# Patient Record
Sex: MALE | Race: BLACK OR AFRICAN AMERICAN | NOT HISPANIC OR LATINO | URBAN - METROPOLITAN AREA
[De-identification: names, ages, dates, MRNs, and addresses within clinical notes are randomized per-mention and may not be internally consistent; named-entity substitution may affect disease eponyms.]

---

## 2017-04-06 ENCOUNTER — TELEPHONE (OUTPATIENT)
Dept: HEMATOLOGY/ONCOLOGY | Facility: CLINIC | Age: 67
End: 2017-04-06

## 2017-04-06 DIAGNOSIS — C34.91 PRIMARY CANCER OF RIGHT LUNG: ICD-10-CM

## 2017-04-06 NOTE — TELEPHONE ENCOUNTER
----- Message from Octavio Reyez RN sent at 4/6/2017 10:32 AM CDT -----  Contact: Augusta Salazar ( wife) [phone]      ----- Message -----     From: Sandie Gracia     Sent: 4/4/2017  10:46 AM       To: KIMBERLY Porter ( wife) [phone] called in reference to scheduling pt for an appt with lung cancer.

## 2017-04-06 NOTE — TELEPHONE ENCOUNTER
----- Message from Sandie Gracia sent at 4/4/2017 10:46 AM CDT -----  Contact: Augusta Martin ( wife) [phone]  Augusta Salazar ( wife) [phone] called in reference to scheduling pt for an appt with lung cancer.

## 2017-04-18 ENCOUNTER — HOSPITAL ENCOUNTER (OUTPATIENT)
Dept: RADIOLOGY | Facility: HOSPITAL | Age: 67
Discharge: HOME | End: 2017-04-18
Attending: INTERNAL MEDICINE
Payer: MEDICARE

## 2017-04-18 VITALS — WEIGHT: 123.88 LBS | HEIGHT: 62 IN | BODY MASS INDEX: 22.8 KG/M2

## 2017-04-18 DIAGNOSIS — C34.91 PRIMARY CANCER OF RIGHT LUNG: ICD-10-CM

## 2017-04-18 PROCEDURE — 78815 PET IMAGE W/CT SKULL-THIGH: CPT | Mod: 26,PS,, | Performed by: NUCLEAR MEDICINE

## 2017-04-18 PROCEDURE — A9552 F18 FDG: HCPCS

## 2017-04-19 ENCOUNTER — LAB VISIT (OUTPATIENT)
Dept: LAB | Facility: HOSPITAL | Age: 67
End: 2017-04-19
Attending: INTERNAL MEDICINE
Payer: MEDICARE

## 2017-04-19 ENCOUNTER — INITIAL CONSULT (OUTPATIENT)
Dept: HEMATOLOGY/ONCOLOGY | Facility: CLINIC | Age: 67
End: 2017-04-19
Payer: MEDICARE

## 2017-04-19 VITALS
OXYGEN SATURATION: 97 % | DIASTOLIC BLOOD PRESSURE: 63 MMHG | HEART RATE: 83 BPM | SYSTOLIC BLOOD PRESSURE: 107 MMHG | TEMPERATURE: 98 F | RESPIRATION RATE: 18 BRPM | HEIGHT: 62 IN | WEIGHT: 124.13 LBS | BODY MASS INDEX: 22.84 KG/M2

## 2017-04-19 DIAGNOSIS — C34.91 PRIMARY CANCER OF RIGHT LUNG: ICD-10-CM

## 2017-04-19 DIAGNOSIS — R06.02 SOB (SHORTNESS OF BREATH): Primary | ICD-10-CM

## 2017-04-19 DIAGNOSIS — C34.91 PRIMARY CANCER OF RIGHT LUNG: Primary | ICD-10-CM

## 2017-04-19 LAB
ALBUMIN SERPL BCP-MCNC: 3.6 G/DL
ALP SERPL-CCNC: 108 U/L
ALT SERPL W/O P-5'-P-CCNC: 9 U/L
ANION GAP SERPL CALC-SCNC: 8 MMOL/L
AST SERPL-CCNC: 13 U/L
BILIRUB SERPL-MCNC: 0.4 MG/DL
BUN SERPL-MCNC: 8 MG/DL
CALCIUM SERPL-MCNC: 9.7 MG/DL
CHLORIDE SERPL-SCNC: 97 MMOL/L
CO2 SERPL-SCNC: 26 MMOL/L
CREAT SERPL-MCNC: 1.1 MG/DL
ERYTHROCYTE [DISTWIDTH] IN BLOOD BY AUTOMATED COUNT: 14.9 %
EST. GFR  (AFRICAN AMERICAN): >60 ML/MIN/1.73 M^2
EST. GFR  (NON AFRICAN AMERICAN): >60 ML/MIN/1.73 M^2
GLUCOSE SERPL-MCNC: 97 MG/DL
HCT VFR BLD AUTO: 36.1 %
HGB BLD-MCNC: 12.1 G/DL
MAGNESIUM SERPL-MCNC: 1.9 MG/DL
MCH RBC QN AUTO: 28.9 PG
MCHC RBC AUTO-ENTMCNC: 33.5 %
MCV RBC AUTO: 86 FL
NEUTROPHILS # BLD AUTO: 3.5 K/UL
PLATELET # BLD AUTO: 405 K/UL
PMV BLD AUTO: 8.9 FL
POTASSIUM SERPL-SCNC: 5.5 MMOL/L
PROT SERPL-MCNC: 8.4 G/DL
RBC # BLD AUTO: 4.19 M/UL
SODIUM SERPL-SCNC: 131 MMOL/L
WBC # BLD AUTO: 6.85 K/UL

## 2017-04-19 PROCEDURE — 99205 OFFICE O/P NEW HI 60 MIN: CPT | Mod: S$GLB,,, | Performed by: INTERNAL MEDICINE

## 2017-04-19 PROCEDURE — 83735 ASSAY OF MAGNESIUM: CPT

## 2017-04-19 PROCEDURE — 80053 COMPREHEN METABOLIC PANEL: CPT

## 2017-04-19 PROCEDURE — 85027 COMPLETE CBC AUTOMATED: CPT

## 2017-04-19 PROCEDURE — 36415 COLL VENOUS BLD VENIPUNCTURE: CPT

## 2017-04-19 PROCEDURE — 99999 PR PBB SHADOW E&M-EST. PATIENT-LVL III: CPT | Mod: PBBFAC,,, | Performed by: INTERNAL MEDICINE

## 2017-04-19 PROCEDURE — 1160F RVW MEDS BY RX/DR IN RCRD: CPT | Mod: S$GLB,,, | Performed by: INTERNAL MEDICINE

## 2017-04-19 RX ORDER — MORPHINE SULFATE 15 MG/1
15 TABLET, FILM COATED, EXTENDED RELEASE ORAL
COMMUNITY
End: 2017-07-19 | Stop reason: SDUPTHER

## 2017-04-19 RX ORDER — BENZONATATE 100 MG/1
100 CAPSULE ORAL
COMMUNITY
Start: 2016-09-29

## 2017-04-19 RX ORDER — CODEINE PHOSPHATE AND GUAIFENESIN 10; 100 MG/5ML; MG/5ML
5 SOLUTION ORAL 3 TIMES DAILY PRN
COMMUNITY

## 2017-04-19 RX ORDER — ATORVASTATIN CALCIUM 40 MG/1
40 TABLET, FILM COATED ORAL
COMMUNITY
Start: 2016-10-05

## 2017-04-19 RX ORDER — ASPIRIN 81 MG/1
81 TABLET ORAL
COMMUNITY
Start: 2016-10-05 | End: 2017-11-15 | Stop reason: SDUPTHER

## 2017-04-19 RX ORDER — OXYCODONE AND ACETAMINOPHEN 5; 325 MG/1; MG/1
1 TABLET ORAL
COMMUNITY
Start: 2016-10-27 | End: 2017-07-19 | Stop reason: SDUPTHER

## 2017-04-19 RX ORDER — TIOTROPIUM BROMIDE 18 UG/1
18 CAPSULE ORAL; RESPIRATORY (INHALATION)
COMMUNITY
Start: 2016-10-05 | End: 2017-04-19 | Stop reason: SDUPTHER

## 2017-04-19 RX ORDER — LISINOPRIL 2.5 MG/1
2.5 TABLET ORAL
COMMUNITY
Start: 2016-11-22 | End: 2018-01-05

## 2017-04-19 RX ORDER — ALBUTEROL SULFATE 90 UG/1
2 AEROSOL, METERED RESPIRATORY (INHALATION)
COMMUNITY
Start: 2016-10-05 | End: 2017-08-20

## 2017-04-19 RX ORDER — IPRATROPIUM BROMIDE AND ALBUTEROL SULFATE 2.5; .5 MG/3ML; MG/3ML
3 SOLUTION RESPIRATORY (INHALATION)
COMMUNITY
Start: 2016-10-05 | End: 2017-08-20

## 2017-04-19 RX ORDER — CARVEDILOL 3.12 MG/1
3.12 TABLET ORAL
COMMUNITY
Start: 2016-10-05 | End: 2017-04-26 | Stop reason: SDUPTHER

## 2017-04-19 RX ORDER — TIOTROPIUM BROMIDE 18 UG/1
18 CAPSULE ORAL; RESPIRATORY (INHALATION) DAILY
Qty: 30 CAPSULE | Refills: 1 | Status: SHIPPED | OUTPATIENT
Start: 2017-04-19 | End: 2017-11-06 | Stop reason: SDUPTHER

## 2017-04-19 NOTE — LETTER
April 19, 2017      Ubaldo Thomason MD  [address]  [facility] [address]           [facility] Hematology Oncology  [address]  [facility] [address]  Phone: [phone]          Patient: Karsten Salazar   MR Number: [id number]   YOB: 1950   Date of Visit: 4/19/2017       Dear Dr. Ubaldo Thomason:    Thank you for referring Karsten Salazar to me for evaluation. Attached you will find relevant portions of my assessment and plan of care.    If you have questions, please do not hesitate to call me. I look forward to following Karsten Salazar along with you.    Sincerely,    Gomez Garsia DO, FACP    Enclosure  CC:  No Recipients    If you would like to receive this communication electronically, please contact [email or web address] or [phone] to request more information on [employer] access.    For providers and/or their staff who would like to refer a patient to [facility], please contact us through our one-stop-shop provider referral line, [facility], at [phone].    If you feel you have received this communication in error or would no longer like to receive these types of communications, please e-mail [email or web address]

## 2017-04-19 NOTE — PROGRESS NOTES
PATIENT: Karsten Salazar  MRN: [id number]  DATE: 4/19/2017      Diagnosis:   1. Primary cancer of right lung        Chief Complaint: Lung Cancer      Oncologic History:      Oncologic History Non-small cell lung cancer diagnosed 6/2016     Oncologic Treatment Concurrent chemoXRT with weekly carboplatin and paclitaxel, XRT to a dose of 5840 cGy 10/31/2016    Pathology Squamous cell carcinoma, T3, N2, M0 (6/2016)        Subjective:    Interval History: Mr. Salazar is a 67 y.o. male who is seen as an initial visit.  He has previously seen Dr. Ubaldo Thomason at [facility].  His history dates to 6/2016 when he sought medical attention for shortness of breath and cough.  He was found to have a large lung mass in the right upper lobe with mediastinal lymphadenopathy.  He underwent subsequent workup, however, symptoms worsened and progressed to him having a cardiac arrest requiring CPR and prolonged ICU stay.  Eventually a biopsy was performed showing squamous cell lung cancer.  This was initially staged as a T3, N2.  He was treated with concurrent chemotherapy and radiation until 10/31/16.  He is an on surveillance since this time.  His insurance changed and is now [facility].    He complains of some ongoing dyspnea on exertion.  He also complains of cough but does not have hemoptysis.  His weight has been stable.  He does have chest wall pain which is controlled.  His chest wall pain is due to prior CPR and chronic.  He has no other new complaints.    Past Medical History:   Past Medical History:   Diagnosis Date    History of cardiac arrest     Hyperlipidemia     Hypertension     Primary cancer of right lung 4/6/2017       Past Surgical HIstory:   Past Surgical History:   Procedure Laterality Date    HERNIA REPAIR         Family History:   Family History   Problem Relation Age of Onset    Cancer Mother        Social History:  reports that he has quit smoking. His smoking use included Cigarettes. He has a 72.00  pack-year smoking history. He does not have any smokeless tobacco history on file. He reports that he drinks about 10.2 oz of alcohol per week  He reports that he does not use illicit drugs.    Allergies:  Review of patient's allergies indicates:  No Known Allergies    Medications:  Current Outpatient Prescriptions   Medication Sig Dispense Refill    albuterol 90 mcg/actuation inhaler Inhale 2 puffs into the lungs.      albuterol-ipratropium 2.5mg-0.5mg/3mL (DUO-NEB) 0.5 mg-3 mg(2.5 mg base)/3 mL nebulizer solution Inhale 3 mLs into the lungs.      aspirin (ECOTRIN) 81 MG EC tablet Take 81 mg by mouth.      atorvastatin (LIPITOR) 40 MG tablet Take 40 mg by mouth.      benzonatate (TESSALON) 100 MG capsule Take 100 mg by mouth.      carvedilol (COREG) 3.125 MG tablet Take 3.125 mg by mouth.      guaifenesin-codeine 100-10 mg/5 ml (TUSSI-ORGANIDIN NR)  mg/5 mL syrup Take 5 mLs by mouth 3 (three) times daily as needed for Cough.      lisinopril (PRINIVIL,ZESTRIL) 2.5 MG tablet Take 2.5 mg by mouth.      oxycodone-acetaminophen (PERCOCET) 5-325 mg per tablet Take 1 tablet by mouth.      morphine (MS CONTIN) 15 MG 12 hr tablet Take 15 mg by mouth.      tiotropium (SPIRIVA) 18 mcg inhalation capsule Inhale 1 capsule (18 mcg total) into the lungs once daily. 30 capsule 1     No current facility-administered medications for this visit.        Review of Systems   Constitutional: Negative for appetite change, chills, fatigue, fever and unexpected weight change.   HENT: Negative for dental problem, sinus pressure and sneezing.    Eyes: Negative for visual disturbance.   Respiratory: Positive for cough and shortness of breath. Negative for choking and chest tightness.    Cardiovascular: Positive for chest pain. Negative for leg swelling.   Gastrointestinal: Negative for abdominal pain, blood in stool, constipation, diarrhea and nausea.   Genitourinary: Negative for difficulty urinating and dysuria.  "  Musculoskeletal: Negative for arthralgias and back pain.   Skin: Negative for rash and wound.   Neurological: Negative for dizziness, light-headedness and headaches.   Hematological: Negative for adenopathy. Does not bruise/bleed easily.   Psychiatric/Behavioral: Negative for sleep disturbance. The patient is not nervous/anxious.        ECOG Performance Status:   ECOG SCORE    1 - Restricted in strenuous activity-ambulatory and able to carry out work of a light nature          Objective:      Vitals:   Vitals:    04/19/17 0836   BP: 107/63   BP Location: Right arm   Patient Position: Sitting   BP Method: Automatic   Pulse: 83   Resp: 18   Temp: 98 °F (36.7 °C)   TempSrc: Oral   SpO2: 97%   Weight: 56.3 kg (124 lb 1.9 oz)   Height: 5' 2" (1.575 m)     BMI: Body mass index is 22.7 kg/(m^2).    Physical Exam   Constitutional: He is oriented to person, place, and time. He appears well-developed and well-nourished.   HENT:   Head: Normocephalic and atraumatic.   Eyes: Pupils are equal, round, and reactive to light.   Neck: Normal range of motion. Neck supple.   Cardiovascular: Normal rate and regular rhythm.    Pulmonary/Chest: Effort normal and breath sounds normal. No respiratory distress.   Abdominal: Soft. He exhibits no distension. There is no tenderness.   Musculoskeletal: He exhibits no edema or tenderness.   Lymphadenopathy:     He has no cervical adenopathy.   Neurological: He is alert and oriented to person, place, and time. No cranial nerve deficit.   Skin: Skin is warm and dry.   Psychiatric: He has a normal mood and affect. His behavior is normal.       Laboratory Data:  Lab Visit on 04/19/2017   Component Date Value Ref Range Status    Sodium 04/19/2017 131* 136 - 145 mmol/L Final    Potassium 04/19/2017 5.5* 3.5 - 5.1 mmol/L Final    *No Visible Hemolysis    Chloride 04/19/2017 97  95 - 110 mmol/L Final    CO2 04/19/2017 26  23 - 29 mmol/L Final    Glucose 04/19/2017 97  70 - 110 mg/dL Final    " BUN, Bld 04/19/2017 8  8 - 23 mg/dL Final    Creatinine 04/19/2017 1.1  0.5 - 1.4 mg/dL Final    Calcium 04/19/2017 9.7  8.7 - 10.5 mg/dL Final    Total Protein 04/19/2017 8.4  6.0 - 8.4 g/dL Final    Albumin 04/19/2017 3.6  3.5 - 5.2 g/dL Final    Total Bilirubin 04/19/2017 0.4  0.1 - 1.0 mg/dL Final    Comment: For infants and newborns, interpretation of results should be based  on gestational age, weight and in agreement with clinical  observations.  Premature Infant recommended reference ranges:  Up to 24 hours.............<8.0 mg/dL  Up to 48 hours............<12.0 mg/dL  3-5 days..................<15.0 mg/dL  6-29 days.................<15.0 mg/dL      Alkaline Phosphatase 04/19/2017 108  55 - 135 U/L Final    AST 04/19/2017 13  10 - 40 U/L Final    ALT 04/19/2017 9* 10 - 44 U/L Final    Anion Gap 04/19/2017 8  8 - 16 mmol/L Final    eGFR if African American 04/19/2017 >60.0  >60 mL/min/1.73 m^2 Final    eGFR if non African American 04/19/2017 >60.0  >60 mL/min/1.73 m^2 Final    Comment: Calculation used to obtain the estimated glomerular filtration  rate (eGFR) is the CKD-EPI equation. Since race is unknown   in our information system, the eGFR values for   -American and Non--American patients are given   for each creatinine result.      Magnesium 04/19/2017 1.9  1.6 - 2.6 mg/dL Final    WBC 04/19/2017 6.85  3.90 - 12.70 K/uL Final    RBC 04/19/2017 4.19* 4.60 - 6.20 M/uL Final    Hemoglobin 04/19/2017 12.1* 14.0 - 18.0 g/dL Final    Hematocrit 04/19/2017 36.1* 40.0 - 54.0 % Final    MCV 04/19/2017 86  82 - 98 fL Final    MCH 04/19/2017 28.9  27.0 - 31.0 pg Final    MCHC 04/19/2017 33.5  32.0 - 36.0 % Final    RDW 04/19/2017 14.9* 11.5 - 14.5 % Final    Platelets 04/19/2017 405* 150 - 350 K/uL Final    MPV 04/19/2017 8.9* 9.2 - 12.9 fL Final    Gran # 04/19/2017 3.5  1.8 - 7.7 K/uL Final    Comment: The ANC is based on a white cell differential from an   automated cell  counter. It has not been microscopically   reviewed for the presence of abnormal cells. Clinical   correlation is required.           Imaging:  PET/CT reviewed.      Assessment:       1. Primary cancer of right lung           Plan:     I have had a long discussion with Mr. Salazar and his wife today concerning his disease.  I have reviewed the PET images with him today.  He does have some hypermetabolic activity at the right upper lobe mass which was previously radiated.  Additionally, he has hypermetabolic activity in the right lower lobe which appears new.  I have asked them to  images from [facility] so that we can compare as I do not think this mass was there previously.  I will discuss his case in our multidisciplinary thoracic tumor board next week.  He may need an additional biopsy.  I have sent a cell free DNA analysis and also sent him for an MRI the brain.  Follow back up with me in 2 weeks for treatment recommendations.  Multiple questions were answered and he is agreeable with this plan.      Gomez Garsia DO, FACP  Hematology & Oncology  [facility] [place] [address]  ph. [phone]  Fax. [phone]    60 minutes were spent in coordination of patient's care, record review and counseling.  More than 50% of the time was face-to-face.

## 2017-04-26 ENCOUNTER — DOCUMENTATION ONLY (OUTPATIENT)
Dept: CARDIOTHORACIC SURGERY | Facility: CLINIC | Age: 67
End: 2017-04-26

## 2017-04-26 ENCOUNTER — HOSPITAL ENCOUNTER (OUTPATIENT)
Dept: RADIOLOGY | Facility: HOSPITAL | Age: 67
Discharge: HOME | End: 2017-04-26
Attending: INTERNAL MEDICINE
Payer: MEDICARE

## 2017-04-26 DIAGNOSIS — I10 ESSENTIAL HYPERTENSION: Primary | ICD-10-CM

## 2017-04-26 DIAGNOSIS — I10 ESSENTIAL HYPERTENSION: ICD-10-CM

## 2017-04-26 DIAGNOSIS — C34.91 PRIMARY CANCER OF RIGHT LUNG: ICD-10-CM

## 2017-04-26 PROCEDURE — A9585 GADOBUTROL INJECTION: HCPCS | Performed by: INTERNAL MEDICINE

## 2017-04-26 PROCEDURE — 25500020 PHARM REV CODE 255: Performed by: INTERNAL MEDICINE

## 2017-04-26 PROCEDURE — 70553 MRI BRAIN STEM W/O & W/DYE: CPT | Mod: 26,,, | Performed by: RADIOLOGY

## 2017-04-26 PROCEDURE — 70553 MRI BRAIN STEM W/O & W/DYE: CPT | Mod: TC

## 2017-04-26 RX ORDER — GADOBUTROL 604.72 MG/ML
6 INJECTION INTRAVENOUS
Status: COMPLETED | OUTPATIENT
Start: 2017-04-26 | End: 2017-04-26

## 2017-04-26 RX ORDER — CARVEDILOL 3.12 MG/1
TABLET ORAL
Qty: 180 TABLET | Refills: 0 | Status: SHIPPED | OUTPATIENT
Start: 2017-04-26

## 2017-04-26 RX ORDER — CARVEDILOL 3.12 MG/1
3.12 TABLET ORAL 2 TIMES DAILY
Qty: 60 TABLET | Refills: 0 | Status: SHIPPED | OUTPATIENT
Start: 2017-04-26 | End: 2017-04-26 | Stop reason: SDUPTHER

## 2017-04-26 RX ADMIN — GADOBUTROL 6 ML: 604.72 INJECTION INTRAVENOUS at 05:04

## 2017-04-26 NOTE — TELEPHONE ENCOUNTER
----- Message from Adan Real sent at 4/26/2017  1:15 PM CDT -----  Contact: pt wife   Pt wife is calling to get a refill for (carvedilol (COREG) 3.125 MG tablet), pt will be using [facility].  Contact number [phone]

## 2017-04-26 NOTE — PATIENT CARE CONFERENCE
[facility]      THORACIC MULTIDISCIPLINARY TUMOR BOARD  PATIENT REVIEW FORM  ________________________________________________________________________    CLINIC #: [id number]  DATE: 04/26/2017    TUMOR SITE:   NSCLC    PRESENTER:   Dr. Garsia     PATIENT SUMMARY:   68 y/o with a large lung mass in the RUL with mediastinal lymphadenopathy. He underwent subsequent workup, however, symptoms worsened and progressed to him having a cardiac arrest requiring CPR and prolonged ICU stay. Eventually a biopsy was performed showing squamous cell lung cancer. This was initially staged as a T3, N2.   He was treated with concurrent chemotherapy and radiation until 10/31/16 and is on surveillance since this time. He has a 72 pack-year smoking history.  PET performed on 4/17/17 There is an avidly hypermetabolic mass in the RLL which measures approximately 2.5 x 2.0 cm and has SUV max of 14.9 compatible with malignancy. A centrally necrotic mass is noted in the RUL abutting the mediastinum and pleura with SUV max 5.2 compatible with an additional focus of malignancy.    BOARD RECOMMENDATIONS:   Request PD-L1 to be sent off from pathology at the outside facility.    CONSULT NEEDED:     [] Surgery    [] Hem/Onc    [] Rad/Onc    [] Dietary                 [] Social Service    [] Psychology       [] Pulmonology    Clinical Stage: Tumor  Node(s)  Metastasis   Pathologic Stage: T3, N2, M0        GROUP STAGE:     [] O    [] 1A    [] IB    [] IIA    [] IIB     [] IIIA     [] IIIB     [] IIIC    [] IV                               [] Local recurrence     [] Regional recurrence     [] Distant recurrence                   [x] NSCLC     [] SCLC     Tumor type: Squamous    Unstageable:      [] Yes     [] No  Metastatic site(s):          [x] Alis'l Treatment Guidelines reviewed and care planned is consistent with guidelines.         (i.e., NCCN, NCI, PD, ACO, AUA, etc.)    PRESENTATION AT CANCER CONFERENCE:         [] Prospective    []  Retrospective     [] Follow-Up          [] Eligible for clinical trial

## 2017-04-27 DIAGNOSIS — C34.91 PRIMARY LUNG CANCER, RIGHT: Primary | ICD-10-CM

## 2017-04-27 NOTE — TELEPHONE ENCOUNTER
Fax [facility] hospital a rocky for the block and sent to it to the Path lab.

## 2017-05-22 ENCOUNTER — TELEPHONE (OUTPATIENT)
Dept: ADMINISTRATIVE | Facility: OTHER | Age: 67
End: 2017-05-22

## 2017-05-22 NOTE — TELEPHONE ENCOUNTER
----- Message from Gomez Garsia DO, FACP sent at 5/22/2017 12:23 PM CDT -----  I need to see him this Wednesday if possible.  I saw his appointment isn't until mid June.

## 2017-05-23 ENCOUNTER — TELEPHONE (OUTPATIENT)
Dept: ADMINISTRATIVE | Facility: OTHER | Age: 67
End: 2017-05-23

## 2017-05-24 ENCOUNTER — RESEARCH ENCOUNTER (OUTPATIENT)
Dept: RESEARCH | Facility: HOSPITAL | Age: 67
End: 2017-05-24

## 2017-05-24 ENCOUNTER — OFFICE VISIT (OUTPATIENT)
Dept: HEMATOLOGY/ONCOLOGY | Facility: CLINIC | Age: 67
End: 2017-05-24
Payer: MEDICARE

## 2017-05-24 ENCOUNTER — LAB VISIT (OUTPATIENT)
Dept: LAB | Facility: HOSPITAL | Age: 67
End: 2017-05-24
Attending: INTERNAL MEDICINE
Payer: MEDICARE

## 2017-05-24 VITALS
TEMPERATURE: 98 F | BODY MASS INDEX: 22.84 KG/M2 | OXYGEN SATURATION: 97 % | HEART RATE: 85 BPM | RESPIRATION RATE: 18 BRPM | HEIGHT: 62 IN | SYSTOLIC BLOOD PRESSURE: 107 MMHG | DIASTOLIC BLOOD PRESSURE: 64 MMHG | WEIGHT: 124.13 LBS

## 2017-05-24 DIAGNOSIS — C34.91 PRIMARY LUNG CANCER, RIGHT: Primary | ICD-10-CM

## 2017-05-24 DIAGNOSIS — C34.91 PRIMARY CANCER OF RIGHT LUNG: ICD-10-CM

## 2017-05-24 DIAGNOSIS — C34.90 RECURRENT NON-SMALL CELL LUNG CANCER: ICD-10-CM

## 2017-05-24 DIAGNOSIS — R59.0 MEDIASTINAL LYMPHADENOPATHY: ICD-10-CM

## 2017-05-24 LAB
ALBUMIN SERPL BCP-MCNC: 3.5 G/DL
ALP SERPL-CCNC: 101 U/L
ALT SERPL W/O P-5'-P-CCNC: 10 U/L
ANION GAP SERPL CALC-SCNC: 6 MMOL/L
AST SERPL-CCNC: 15 U/L
BILIRUB SERPL-MCNC: 0.5 MG/DL
BUN SERPL-MCNC: 10 MG/DL
CALCIUM SERPL-MCNC: 9.5 MG/DL
CHLORIDE SERPL-SCNC: 91 MMOL/L
CO2 SERPL-SCNC: 27 MMOL/L
CREAT SERPL-MCNC: 1.1 MG/DL
ERYTHROCYTE [DISTWIDTH] IN BLOOD BY AUTOMATED COUNT: 13.5 %
EST. GFR  (AFRICAN AMERICAN): >60 ML/MIN/1.73 M^2
EST. GFR  (NON AFRICAN AMERICAN): >60 ML/MIN/1.73 M^2
GLUCOSE SERPL-MCNC: 89 MG/DL
HCT VFR BLD AUTO: 35 %
HGB BLD-MCNC: 11.7 G/DL
MAGNESIUM SERPL-MCNC: 2 MG/DL
MCH RBC QN AUTO: 29.2 PG
MCHC RBC AUTO-ENTMCNC: 33.4 %
MCV RBC AUTO: 87 FL
NEUTROPHILS # BLD AUTO: 4.2 K/UL
PLATELET # BLD AUTO: 475 K/UL
PMV BLD AUTO: 8.8 FL
POTASSIUM SERPL-SCNC: 4.9 MMOL/L
PROT SERPL-MCNC: 8.4 G/DL
RBC # BLD AUTO: 4.01 M/UL
SODIUM SERPL-SCNC: 124 MMOL/L
WBC # BLD AUTO: 7.22 K/UL

## 2017-05-24 PROCEDURE — 80053 COMPREHEN METABOLIC PANEL: CPT

## 2017-05-24 PROCEDURE — 3074F SYST BP LT 130 MM HG: CPT | Mod: S$GLB,,, | Performed by: INTERNAL MEDICINE

## 2017-05-24 PROCEDURE — 36415 COLL VENOUS BLD VENIPUNCTURE: CPT

## 2017-05-24 PROCEDURE — 3078F DIAST BP <80 MM HG: CPT | Mod: S$GLB,,, | Performed by: INTERNAL MEDICINE

## 2017-05-24 PROCEDURE — 99999 PR PBB SHADOW E&M-EST. PATIENT-LVL V: CPT | Mod: PBBFAC,,, | Performed by: INTERNAL MEDICINE

## 2017-05-24 PROCEDURE — 1160F RVW MEDS BY RX/DR IN RCRD: CPT | Mod: S$GLB,,, | Performed by: INTERNAL MEDICINE

## 2017-05-24 PROCEDURE — 99215 OFFICE O/P EST HI 40 MIN: CPT | Mod: S$GLB,,, | Performed by: INTERNAL MEDICINE

## 2017-05-24 PROCEDURE — 85027 COMPLETE CBC AUTOMATED: CPT

## 2017-05-24 PROCEDURE — 83735 ASSAY OF MAGNESIUM: CPT

## 2017-05-24 RX ORDER — HEPARIN 100 UNIT/ML
500 SYRINGE INTRAVENOUS
Status: CANCELLED | OUTPATIENT
Start: 2017-05-30

## 2017-05-24 RX ORDER — HEPARIN 100 UNIT/ML
500 SYRINGE INTRAVENOUS
Status: CANCELLED | OUTPATIENT
Start: 2017-06-06

## 2017-05-24 RX ORDER — SODIUM CHLORIDE 0.9 % (FLUSH) 0.9 %
10 SYRINGE (ML) INJECTION
Status: CANCELLED | OUTPATIENT
Start: 2017-05-30

## 2017-05-24 RX ORDER — SODIUM CHLORIDE 0.9 % (FLUSH) 0.9 %
10 SYRINGE (ML) INJECTION
Status: CANCELLED | OUTPATIENT
Start: 2017-06-06

## 2017-05-24 RX ORDER — ONDANSETRON 4 MG/1
4 TABLET, FILM COATED ORAL EVERY 8 HOURS PRN
Qty: 60 TABLET | Refills: 1 | Status: SHIPPED | OUTPATIENT
Start: 2017-05-24 | End: 2018-05-24

## 2017-05-24 NOTE — PROGRESS NOTES
PATIENT: Karsten Salazar  MRN: [id number]  DATE: 5/24/2017      Diagnosis:   1. Primary lung cancer, right    2. Mediastinal lymphadenopathy    3. Recurrent non-small cell lung cancer        Chief Complaint: Lung Cancer      Oncologic History:      Oncologic History Non-small cell lung cancer diagnosed 6/2016     Oncologic Treatment Concurrent chemoXRT with weekly carboplatin and paclitaxel, XRT to a dose of 5840 cGy 10/31/2016    Pathology Squamous cell carcinoma, T3, N2, M0 (6/2016), PD-L1 TPS 1/%        Subjective:    Interval History: Mr. Salazar is a 67 y.o. male who is seen in follow-up for lung cancer.  Complains of some ongoing cough without hemoptysis.  He does have some ongoing dyspnea and also some right-sided chest pain when he lays on his right side.  His weight has been stable.  He is still ambulatory.  He has no other new complaints.    He has previously seen Dr. Ubaldo Thomason at [facility].  His history dates to 6/2016 when he sought medical attention for shortness of breath and cough.  He was found to have a large lung mass in the right upper lobe with mediastinal lymphadenopathy.  He underwent subsequent workup, however, symptoms worsened and progressed to him having a cardiac arrest requiring CPR and prolonged ICU stay.  Eventually a biopsy was performed showing squamous cell lung cancer.  This was initially staged as a T3, N2.  He was treated with concurrent chemotherapy and radiation until 10/31/16.  He is an on surveillance since this time.  His insurance changed and is now following and [facility].  Repeat PET/CT had shown locally recurrent disease.  MRI of the brain was negative.      Past Medical History:   Past Medical History:   Diagnosis Date    History of cardiac arrest     Hyperlipidemia     Hypertension     Mediastinal lymphadenopathy 5/24/2017    Primary cancer of right lung 4/6/2017    Recurrent non-small cell lung cancer 5/24/2017       Past Surgical HIstory:   Past Surgical History:    Procedure Laterality Date    HERNIA REPAIR         Family History:   Family History   Problem Relation Age of Onset    Cancer Mother        Social History:  reports that he has quit smoking. His smoking use included Cigarettes. He has a 72.00 pack-year smoking history. He does not have any smokeless tobacco history on file. He reports that he drinks about 10.2 oz of alcohol per week . He reports that he does not use drugs.    Allergies:  Review of patient's allergies indicates:  No Known Allergies    Medications:  Current Outpatient Prescriptions   Medication Sig Dispense Refill    albuterol 90 mcg/actuation inhaler Inhale 2 puffs into the lungs.      albuterol-ipratropium 2.5mg-0.5mg/3mL (DUO-NEB) 0.5 mg-3 mg(2.5 mg base)/3 mL nebulizer solution Inhale 3 mLs into the lungs.      aspirin (ECOTRIN) 81 MG EC tablet Take 81 mg by mouth.      atorvastatin (LIPITOR) 40 MG tablet Take 40 mg by mouth.      benzonatate (TESSALON) 100 MG capsule Take 100 mg by mouth.      carvedilol (COREG) 3.125 MG tablet TAKE 1 TABLET BY MOUTH TWICE DAILY 180 tablet 0    guaifenesin-codeine 100-10 mg/5 ml (TUSSI-ORGANIDIN NR)  mg/5 mL syrup Take 5 mLs by mouth 3 (three) times daily as needed for Cough.      lisinopril (PRINIVIL,ZESTRIL) 2.5 MG tablet Take 2.5 mg by mouth.      morphine (MS CONTIN) 15 MG 12 hr tablet Take 15 mg by mouth.      ondansetron (ZOFRAN, AS HYDROCHLORIDE,) 4 MG tablet Take 1 tablet (4 mg total) by mouth every 8 (eight) hours as needed for Nausea. 60 tablet 1    oxycodone-acetaminophen (PERCOCET) 5-325 mg per tablet Take 1 tablet by mouth.      tiotropium (SPIRIVA) 18 mcg inhalation capsule Inhale 1 capsule (18 mcg total) into the lungs once daily. 30 capsule 1     No current facility-administered medications for this visit.        Review of Systems   Constitutional: Negative for appetite change, chills, fatigue, fever and unexpected weight change.   HENT: Negative for dental problem, sinus  "pressure and sneezing.    Eyes: Negative for visual disturbance.   Respiratory: Positive for cough and shortness of breath. Negative for choking and chest tightness.    Cardiovascular: Positive for chest pain. Negative for leg swelling.   Gastrointestinal: Negative for abdominal pain, blood in stool, constipation, diarrhea and nausea.   Genitourinary: Negative for difficulty urinating and dysuria.   Musculoskeletal: Negative for arthralgias and back pain.   Skin: Negative for rash and wound.   Neurological: Negative for dizziness, light-headedness and headaches.   Hematological: Negative for adenopathy. Does not bruise/bleed easily.   Psychiatric/Behavioral: Negative for sleep disturbance. The patient is not nervous/anxious.        ECOG Performance Status:   ECOG SCORE    1 - Restricted in strenuous activity-ambulatory and able to carry out work of a light nature          Objective:      Vitals:   Vitals:    05/24/17 0929   BP: 107/64   BP Location: Right arm   Patient Position: Sitting   BP Method: Automatic   Pulse: 85   Resp: 18   Temp: 98.2 °F (36.8 °C)   TempSrc: Oral   SpO2: 97%   Weight: 56.3 kg (124 lb 1.9 oz)   Height: 5' 2" (1.575 m)     BMI: Body mass index is 22.7 kg/m².    Physical Exam   Constitutional: He is oriented to person, place, and time. He appears well-developed and well-nourished.   HENT:   Head: Normocephalic and atraumatic.   Eyes: Pupils are equal, round, and reactive to light.   Neck: Normal range of motion. Neck supple.   Cardiovascular: Normal rate and regular rhythm.    Pulmonary/Chest: Effort normal and breath sounds normal. No respiratory distress.   Abdominal: Soft. He exhibits no distension. There is no tenderness.   Musculoskeletal: He exhibits no edema or tenderness.   Lymphadenopathy:     He has no cervical adenopathy.   Neurological: He is alert and oriented to person, place, and time. No cranial nerve deficit.   Skin: Skin is warm and dry.   Psychiatric: He has a normal mood " and affect. His behavior is normal.       Laboratory Data:  Lab Visit on 05/24/2017   Component Date Value Ref Range Status    Sodium 05/24/2017 124* 136 - 145 mmol/L Final    Potassium 05/24/2017 4.9  3.5 - 5.1 mmol/L Final    Chloride 05/24/2017 91* 95 - 110 mmol/L Final    CO2 05/24/2017 27  23 - 29 mmol/L Final    Glucose 05/24/2017 89  70 - 110 mg/dL Final    BUN, Bld 05/24/2017 10  8 - 23 mg/dL Final    Creatinine 05/24/2017 1.1  0.5 - 1.4 mg/dL Final    Calcium 05/24/2017 9.5  8.7 - 10.5 mg/dL Final    Total Protein 05/24/2017 8.4  6.0 - 8.4 g/dL Final    Albumin 05/24/2017 3.5  3.5 - 5.2 g/dL Final    Total Bilirubin 05/24/2017 0.5  0.1 - 1.0 mg/dL Final    Comment: For infants and newborns, interpretation of results should be based  on gestational age, weight and in agreement with clinical  observations.  Premature Infant recommended reference ranges:  Up to 24 hours.............<8.0 mg/dL  Up to 48 hours............<12.0 mg/dL  3-5 days..................<15.0 mg/dL  6-29 days.................<15.0 mg/dL      Alkaline Phosphatase 05/24/2017 101  55 - 135 U/L Final    AST 05/24/2017 15  10 - 40 U/L Final    ALT 05/24/2017 10  10 - 44 U/L Final    Anion Gap 05/24/2017 6* 8 - 16 mmol/L Final    eGFR if African American 05/24/2017 >60.0  >60 mL/min/1.73 m^2 Final    eGFR if non African American 05/24/2017 >60.0  >60 mL/min/1.73 m^2 Final    Comment: Calculation used to obtain the estimated glomerular filtration  rate (eGFR) is the CKD-EPI equation. Since race is unknown   in our information system, the eGFR values for   -American and Non--American patients are given   for each creatinine result.      Magnesium 05/24/2017 2.0  1.6 - 2.6 mg/dL Final    WBC 05/24/2017 7.22  3.90 - 12.70 K/uL Final    RBC 05/24/2017 4.01* 4.60 - 6.20 M/uL Final    Hemoglobin 05/24/2017 11.7* 14.0 - 18.0 g/dL Final    Hematocrit 05/24/2017 35.0* 40.0 - 54.0 % Final    MCV 05/24/2017 87  82 - 98  fL Final    MCH 05/24/2017 29.2  27.0 - 31.0 pg Final    MCHC 05/24/2017 33.4  32.0 - 36.0 % Final    RDW 05/24/2017 13.5  11.5 - 14.5 % Final    Platelets 05/24/2017 475* 150 - 350 K/uL Final    MPV 05/24/2017 8.8* 9.2 - 12.9 fL Final    Gran # 05/24/2017 4.2  1.8 - 7.7 K/uL Final    Comment: The ANC is based on a white cell differential from an   automated cell counter. It has not been microscopically   reviewed for the presence of abnormal cells. Clinical   correlation is required.           Imaging:  PET/CT 4/18/17  Findings:     Positron emission tomography images demonstrate physiologic head, neck, cardiac, GI and  activity. There is an avidly hypermetabolic mass in the right lower lobe which measures approximately 2.5 x 2.0 cm and has SUV max of 14.9 compatible with malignancy. A centrally necrotic mass is noted in the right upper lobe abutting the mediastinum and pleura with SUV max 5.2 compatible with an additional focus of malignancy.    Noncontrast CT images demonstrate patchy ground glass and consolidative density in the right lung as well as a small right pleural effusion. There is calcific atherosclerosis of the coronary arteries and aorta. There are atrophic changes of the pancreas with calcifications in the pancreatic head likely reflecting sequela of chronic pancreatitis. Multiple calcified gallstones are noted within the lumen of the gallbladder without evidence of inflammation. Postoperative changes are noted to the right hip with orthopedic metallic hardware. Osseous structures demonstrate degenerative changes in the spine. There is interposition of the colon between the right hemidiaphragm/right abdominal wall and the liver.   Impression          Avidly hypermetabolic lesion in the right lower lobe is compatible with pulmonary malignancy. A partially necrotic hypermetabolic mass in the right upper lobe abutting the pleura suggestive of a second focus of malignancy.    No evidence of  distant metastatic disease.  ______________________________________                Assessment:       1. Primary lung cancer, right    2. Mediastinal lymphadenopathy    3. Recurrent non-small cell lung cancer           Plan:     Mr. Salazar was discussed at our multidisciplinary thoracic tumor Board and review of PET/CT does indicate progressive disease.  Recommendations were for systemic treatment.  Cell free DNA analysis did not reveal any targetable mutations.  Additionally, PD-L1 expression was 1%.  Because of this we have discussed options for systemic treatment including repeat platinum-based chemotherapy in the first line setting for recurrent lung cancer.  I have recommended carboplatin and gemcitabine.  I've discussed the rationale, alternatives and potential side effects of treatment with him.  I've given him written information on these 2 agents.  He is agreeable to proceed and has signed an informed consent.  Plan to get this started and we'll check scans following 2 cycles of treatment.  Additionally, we will have him screened for clinical trials and may be contacting him should he qualify and be interested in this.  Multiple questions were answered and he is agreeable with this plan.    Gomez Garsia DO, FACP  Hematology & Oncology  [facility] [place] [address]  ph. [phone]  Fax. [phone]    40 minutes were spent in coordination of patient's care, record review and counseling.  More than 50% of the time was face-to-face.

## 2017-05-24 NOTE — PATIENT INSTRUCTIONS
Gemcitabine injection  What is this medicine?  GEMCITABINE (nghia SIT a been) is a chemotherapy drug. This medicine is used to treat many types of cancer like breast cancer, lung cancer, pancreatic cancer, and ovarian cancer.  How should I use this medicine?  This drug is given as an infusion into a vein. It is administered in a hospital or clinic by a specially trained health care professional.  Talk to your pediatrician regarding the use of this medicine in children. Special care may be needed.  What side effects may I notice from receiving this medicine?  Side effects that you should report to your doctor or health care professional as soon as possible:  · allergic reactions like skin rash, itching or hives, swelling of the face, lips, or tongue  · low blood counts - this medicine may decrease the number of white blood cells, red blood cells and platelets. You may be at increased risk for infections and bleeding.  · signs of infection - fever or chills, cough, sore throat, pain or difficulty passing urine  · signs of decreased platelets or bleeding - bruising, pinpoint red spots on the skin, black, tarry stools, blood in the urine  · signs of decreased red blood cells - unusually weak or tired, fainting spells, lightheadedness  · breathing problems  · chest pain  · mouth sores  · nausea and vomiting  · pain, swelling, redness at site where injected  · pain, tingling, numbness in the hands or feet  · stomach pain  · swelling of ankles, feet, hands  · unusual bleeding  Side effects that usually do not require medical attention (report to your doctor or health care professional if they continue or are bothersome):  · constipation  · diarrhea  · hair loss  · loss of appetite  · stomach upset  What may interact with this medicine?  · medicines to increase blood counts like filgrastim, pegfilgrastim, sargramostim  · some other chemotherapy drugs like cisplatin  · vaccines  Talk to your doctor or health care  professional before taking any of these medicines:  · acetaminophen  · aspirin  · ibuprofen  · ketoprofen  · naproxen  What if I miss a dose?  It is important not to miss your dose. Call your doctor or health care professional if you are unable to keep an appointment.  Where should I keep my medicine?  This drug is given in a hospital or clinic and will not be stored at home.  What should I tell my health care provider before I take this medicine?  They need to know if you have any of these conditions:  · blood disorders  · infection  · kidney disease  · liver disease  · recent or ongoing radiation therapy  · an unusual or allergic reaction to gemcitabine, other chemotherapy, other medicines, foods, dyes, or preservatives  · pregnant or trying to get pregnant  · breast-feeding  What should I watch for while using this medicine?  Visit your doctor for checks on your progress. This drug may make you feel generally unwell. This is not uncommon, as chemotherapy can affect healthy cells as well as cancer cells. Report any side effects. Continue your course of treatment even though you feel ill unless your doctor tells you to stop.  In some cases, you may be given additional medicines to help with side effects. Follow all directions for their use.  Call your doctor or health care professional for advice if you get a fever, chills or sore throat, or other symptoms of a cold or flu. Do not treat yourself. This drug decreases your body's ability to fight infections. Try to avoid being around people who are sick.  This medicine may increase your risk to bruise or bleed. Call your doctor or health care professional if you notice any unusual bleeding.  Be careful brushing and flossing your teeth or using a toothpick because you may get an infection or bleed more easily. If you have any dental work done, tell your dentist you are receiving this medicine.  Avoid taking products that contain aspirin, acetaminophen, ibuprofen,  naproxen, or ketoprofen unless instructed by your doctor. These medicines may hide a fever.  Women should inform their doctor if they wish to become pregnant or think they might be pregnant. There is a potential for serious side effects to an unborn child. Talk to your health care professional or pharmacist for more information. Do not breast-feed an infant while taking this medicine.  Date Last Reviewed:   NOTE:This sheet is a summary. It may not cover all possible information. If you have questions about this medicine, talk to your doctor, pharmacist, or health care provider. Copyright© 2016 [employer]        Carboplatin injection  What is this medicine?  CARBOPLATIN (FRANCIS guy jaimee tin) is a chemotherapy drug. It targets fast dividing cells, like cancer cells, and causes these cells to die. This medicine is used to treat ovarian cancer and many other cancers.  How should I use this medicine?  This drug is usually given as an infusion into a vein. It is administered in a hospital or clinic by a specially trained health care professional.  Talk to your pediatrician regarding the use of this medicine in children. Special care may be needed.  What side effects may I notice from receiving this medicine?  Side effects that you should report to your doctor or health care professional as soon as possible:  · allergic reactions like skin rash, itching or hives, swelling of the face, lips, or tongue  · signs of infection - fever or chills, cough, sore throat, pain or difficulty passing urine  · signs of decreased platelets or bleeding - bruising, pinpoint red spots on the skin, black, tarry stools, nosebleeds  · signs of decreased red blood cells - unusually weak or tired, fainting spells, lightheadedness  · breathing problems  · changes in hearing  · changes in vision  · chest pain  · high blood pressure  · low blood counts - This drug may decrease the number of white blood cells, red blood cells and platelets. You may be  at increased risk for infections and bleeding.  · nausea and vomiting  · pain, swelling, redness or irritation at the injection site  · pain, tingling, numbness in the hands or feet  · problems with balance, talking, walking  · trouble passing urine or change in the amount of urine  Side effects that usually do not require medical attention (report to your doctor or health care professional if they continue or are bothersome):  · hair loss  · loss of appetite  · metallic taste in the mouth or changes in taste  What may interact with this medicine?  · medicines for seizures  · medicines to increase blood counts like filgrastim, pegfilgrastim, sargramostim  · some antibiotics like amikacin, gentamicin, neomycin, streptomycin, tobramycin  · vaccines  Talk to your doctor or health care professional before taking any of these medicines:  · acetaminophen  · aspirin  · ibuprofen  · ketoprofen  · naproxen  What if I miss a dose?  It is important not to miss a dose. Call your doctor or health care professional if you are unable to keep an appointment.  Where should I keep my medicine?  This drug is given in a hospital or clinic and will not be stored at home.  What should I tell my health care provider before I take this medicine?  They need to know if you have any of these conditions:  · blood disorders  · hearing problems  · kidney disease  · recent or ongoing radiation therapy  · an unusual or allergic reaction to carboplatin, cisplatin, other chemotherapy, other medicines, foods, dyes, or preservatives  · pregnant or trying to get pregnant  · breast-feeding  What should I watch for while using this medicine?  Your condition will be monitored carefully while you are receiving this medicine. You will need important blood work done while you are taking this medicine.  This drug may make you feel generally unwell. This is not uncommon, as chemotherapy can affect healthy cells as well as cancer cells. Report any side effects.  Continue your course of treatment even though you feel ill unless your doctor tells you to stop.  In some cases, you may be given additional medicines to help with side effects. Follow all directions for their use.  Call your doctor or health care professional for advice if you get a fever, chills or sore throat, or other symptoms of a cold or flu. Do not treat yourself. This drug decreases your body's ability to fight infections. Try to avoid being around people who are sick.  This medicine may increase your risk to bruise or bleed. Call your doctor or health care professional if you notice any unusual bleeding.  Be careful brushing and flossing your teeth or using a toothpick because you may get an infection or bleed more easily. If you have any dental work done, tell your dentist you are receiving this medicine.  Avoid taking products that contain aspirin, acetaminophen, ibuprofen, naproxen, or ketoprofen unless instructed by your doctor. These medicines may hide a fever.  Do not become pregnant while taking this medicine. Women should inform their doctor if they wish to become pregnant or think they might be pregnant. There is a potential for serious side effects to an unborn child. Talk to your health care professional or pharmacist for more information. Do not breast-feed an infant while taking this medicine.  Date Last Reviewed:   NOTE:This sheet is a summary. It may not cover all possible information. If you have questions about this medicine, talk to your doctor, pharmacist, or health care provider. Copyright© 2016 [employer]

## 2017-05-24 NOTE — Clinical Note
Scheduled chemotherapy for next week See me the following week prior to day #8  Research is reaching out to him to see if he is interested in a clinical trial so this plan may be changing.  He will need labs with each visit

## 2017-05-24 NOTE — PROGRESS NOTES
Dr. Garsia reached out to research RN regarding  study for patient. I called and spoke with patient and his wife to see if they would be interested in clinical trial. I inquired when the patient and his wife would be available to meet and discuss study in more detail. Patient and wife given callback contact information to setup a time and for any questions they may have regarding study.

## 2017-05-25 DIAGNOSIS — I10 ESSENTIAL HYPERTENSION: ICD-10-CM

## 2017-05-25 RX ORDER — CARVEDILOL 3.12 MG/1
TABLET ORAL
Qty: 60 TABLET | Refills: 0 | Status: SHIPPED | OUTPATIENT
Start: 2017-05-25 | End: 2017-05-26 | Stop reason: SDUPTHER

## 2017-05-26 DIAGNOSIS — I10 ESSENTIAL HYPERTENSION: ICD-10-CM

## 2017-05-29 ENCOUNTER — TELEPHONE (OUTPATIENT)
Dept: ADMINISTRATIVE | Facility: OTHER | Age: 67
End: 2017-05-29

## 2017-05-29 ENCOUNTER — RESEARCH ENCOUNTER (OUTPATIENT)
Dept: RESEARCH | Facility: HOSPITAL | Age: 67
End: 2017-05-29

## 2017-05-29 RX ORDER — CARVEDILOL 3.12 MG/1
TABLET ORAL
Qty: 60 TABLET | Refills: 0 | Status: SHIPPED | OUTPATIENT
Start: 2017-05-29 | End: 2018-01-15 | Stop reason: SDUPTHER

## 2017-05-29 NOTE — TELEPHONE ENCOUNTER
----- Message from Gomez Garsia DO FACELVIA sent at 5/24/2017  3:12 PM CDT -----  Scheduled chemotherapy for next week  See me the following week prior to day #8    Research is reaching out to him to see if he is interested in a clinical trial so this plan may be changing.  He will need labs with each visit

## 2017-05-29 NOTE — PROGRESS NOTES
Met with patient and wife to discuss  trial. Information provided about study and prescreening consent reviewed. Consent was sent home with patient to discuss with wife. My contact information was provided for any questions or concerns they may have. Will follow up with patient at his next appointment to see if this is something he would be interested in participating in.

## 2017-06-02 ENCOUNTER — INFUSION (OUTPATIENT)
Dept: INFUSION THERAPY | Facility: HOSPITAL | Age: 67
End: 2017-06-02
Attending: INTERNAL MEDICINE
Payer: MEDICARE

## 2017-06-02 ENCOUNTER — RESEARCH ENCOUNTER (OUTPATIENT)
Dept: RESEARCH | Facility: HOSPITAL | Age: 67
End: 2017-06-02

## 2017-06-02 VITALS
DIASTOLIC BLOOD PRESSURE: 57 MMHG | RESPIRATION RATE: 17 BRPM | SYSTOLIC BLOOD PRESSURE: 94 MMHG | TEMPERATURE: 98 F | HEART RATE: 86 BPM

## 2017-06-02 DIAGNOSIS — C34.91 PRIMARY CANCER OF RIGHT LUNG: Primary | ICD-10-CM

## 2017-06-02 PROCEDURE — 96413 CHEMO IV INFUSION 1 HR: CPT

## 2017-06-02 PROCEDURE — 63600175 PHARM REV CODE 636 W HCPCS: Performed by: INTERNAL MEDICINE

## 2017-06-02 PROCEDURE — 96367 TX/PROPH/DG ADDL SEQ IV INF: CPT

## 2017-06-02 PROCEDURE — 96417 CHEMO IV INFUS EACH ADDL SEQ: CPT

## 2017-06-02 PROCEDURE — 25000003 PHARM REV CODE 250: Performed by: INTERNAL MEDICINE

## 2017-06-02 RX ORDER — SODIUM CHLORIDE 0.9 % (FLUSH) 0.9 %
10 SYRINGE (ML) INJECTION
Status: DISCONTINUED | OUTPATIENT
Start: 2017-06-02 | End: 2017-06-02 | Stop reason: HOSPADM

## 2017-06-02 RX ADMIN — SODIUM CHLORIDE: 0.9 INJECTION, SOLUTION INTRAVENOUS at 01:06

## 2017-06-02 RX ADMIN — PALONOSETRON HYDROCHLORIDE: 0.25 INJECTION INTRAVENOUS at 01:06

## 2017-06-02 RX ADMIN — CARBOPLATIN 390 MG: 10 INJECTION, SOLUTION INTRAVENOUS at 02:06

## 2017-06-02 RX ADMIN — GEMCITABINE 1570 MG: 1 INJECTION, POWDER, LYOPHILIZED, FOR SOLUTION INTRAVENOUS at 02:06

## 2017-06-02 NOTE — PROGRESS NOTES
"Friday June 2, 2017     Protocol: , "Phase II/III Biomarker-Driven Master Protocol for Previously Treated  Squamous Cell Lung Cancer".  Protocol #    IRB # 2014.192.N  PI: Angelo Bailey MD  Version Date: 6/9/15, 9/2/15, 12/29/15, 3/31/16, 8/22/16, 2/13/17 IRB: 12/21/2016, 03/14/2017     Pre-Screening Informed Consent Process:     I met with the patient and wife to discuss the above mentioned protocol. He is alert and oriented x 3. Mood and affect appropriate to the situation. Patient states that he is not participating in any other research studies. Patient verbalized that he understands that the tissue collected for his lung cancer diagnosis will be tested for specific genes and proteins. He also verbalized understanding that if tissue specimen is not adequate enough he would have to undergo another biopsy.   Purpose of the study reviewed with the patient and his wife. He is aware that this is the pre-screening portion of the trial to determine if he has any specific genes or proteins in his tumor sample. Patient states understanding of this information.   Length of study and number of participants reviewed with the patient and his wife; discussed with patient that length of participation in the trial is dependent upon how well his disease responds to his current treatment. If the tumor grows he would then enter the screening phase for a sub-study. Discussed that he will be followed up with every 6 months for up to three years regardless of sub study participation.   Pre-screening procedure discussed with the patient and his wife. Explained that the plan is to use his archival tissue for testing. Patient was made aware that if tissue specimen is not adequate enough he would have to undergo another biopsy. He verbalized understanding.   Possible risks or discomforts and new findings discussed in detail with the patient and his wife. Patient verbalized understanding of this information.   Benefits, costs " and/or payment reimbursement and source of funding all reviewed with the patient and his wife. Patient verbalized understanding.   Other treatments discussed with patient and his wife; he states understanding of this information.   Study related questions/compensation for injury, and whom to contact regarding rights as a research subject all reviewed with patient; he verbalizes understanding of this information.   Voluntary participation and withdrawal from research stressed to patient; he states he understands that he may withdraw consent at any time without compromise to his care.   Confidentiality and HIPAA discussed with patient; process of protection and security of database explained to patient; he verbalizes understanding of this information. Informed the patient and his wife that more information regarding this trial is available on http://www.ClinicalTrials.gov. All verbalized understanding.  Future research was discussed with patient. He is in agreement with bio banking and use of his samples for future research.     Throughout consenting process, patient and his wife were given several opportunities to ask questions; all questions addressed and answered to the patient and his wifes satisfaction per myself. Patient states his willingness to participate in study; patient signed and dated consent form prior to any study specific procedures being performed. A copy of consent form given to patient along with my contact information. Instructed the patient to notify me or Dr. Garsia for any questions and/or concerns. Verbalized understanding

## 2017-06-02 NOTE — PLAN OF CARE
Problem: Patient Care Overview  Goal: Plan of Care Review  Outcome: Ongoing (interventions implemented as appropriate)  Pt arrived, v/s stable, iv started without difficulty, Pt voices, no nausea/ vomiting, neuropathy, diarrhea or shortness of breath. Premeds given as ordered, carbo/gemzar infused without any adverse reactions, pt education on looking out for fevers 100.4 or greater or any signs of infection.pt tolerated treatment/procedure well, no concerns or complaints at this time. Instructed to call MD if problems occur.

## 2017-06-05 ENCOUNTER — RESEARCH ENCOUNTER (OUTPATIENT)
Dept: RESEARCH | Facility: HOSPITAL | Age: 67
End: 2017-06-05

## 2017-06-05 DIAGNOSIS — C34.90 SQUAMOUS CELL LUNG CANCER, UNSPECIFIED LATERALITY: Primary | ICD-10-CM

## 2017-06-05 DIAGNOSIS — Z00.6 EXAMINATION OF PARTICIPANT IN CLINICAL TRIAL: ICD-10-CM

## 2017-06-07 ENCOUNTER — OFFICE VISIT (OUTPATIENT)
Dept: HEMATOLOGY/ONCOLOGY | Facility: CLINIC | Age: 67
End: 2017-06-07
Payer: MEDICARE

## 2017-06-07 VITALS
RESPIRATION RATE: 20 BRPM | TEMPERATURE: 98 F | HEART RATE: 87 BPM | OXYGEN SATURATION: 95 % | WEIGHT: 123 LBS | SYSTOLIC BLOOD PRESSURE: 111 MMHG | BODY MASS INDEX: 22.5 KG/M2 | DIASTOLIC BLOOD PRESSURE: 65 MMHG

## 2017-06-07 DIAGNOSIS — R59.0 MEDIASTINAL LYMPHADENOPATHY: ICD-10-CM

## 2017-06-07 DIAGNOSIS — C34.90 RECURRENT NON-SMALL CELL LUNG CANCER: Primary | ICD-10-CM

## 2017-06-07 PROCEDURE — 99214 OFFICE O/P EST MOD 30 MIN: CPT | Mod: S$GLB,,, | Performed by: PHYSICIAN ASSISTANT

## 2017-06-07 PROCEDURE — 99999 PR PBB SHADOW E&M-EST. PATIENT-LVL III: CPT | Mod: PBBFAC,,, | Performed by: PHYSICIAN ASSISTANT

## 2017-06-07 NOTE — PROGRESS NOTES
Subjective:       Patient ID: Karsten Salazar is a 67 y.o. male.    Chief Complaint: Follow-up    Dr. Garsia's patient    Mr. Salazar is a 67 y.o. male who is seen in follow-up for lung cancer.  He was recently initiated on second line carbo/gem and due for C1D8 Gemcitabine today.    Patient feeling well, tolerated chemotherapy well without significant side effects. He notes cough has improved since first treatment. No hemoptysis.   Continues shortness of breath, using 02 as needed. Patient still with some right sided chest pain when he lays on that side.   Appetite is good. Energy level is fair.   No mucositis.  Mild neuropathy at fingertips which was present prior to surgery.            He has previously seen Dr. Ubaldo Thomason at [facility].  His history dates to 6/2016 when he sought medical attention for shortness of breath and cough.  He was found to have a large lung mass in the right upper lobe with mediastinal lymphadenopathy.  He underwent subsequent workup, however, symptoms worsened and progressed to him having a cardiac arrest requiring CPR and prolonged ICU stay.  Eventually a biopsy was performed showing squamous cell lung cancer.  This was initially staged as a T3, N2.  He was treated with concurrent chemotherapy and radiation until 10/31/16.  He is an on surveillance since this time.  His insurance changed and is now following and [facility].  Repeat PET/CT had shown locally recurrent disease.  MRI of the brain was negative.     From Dr. Garsia's last note: Mr. Salazar was discussed at our multidisciplinary thoracic tumor Board and review of PET/CT does indicate progressive disease.  Recommendations were for systemic treatment.  Cell free DNA analysis did not reveal any targetable mutations.  Additionally, PD-L1 expression was 1%.  Because of this we have discussed options for systemic treatment including repeat platinum-based chemotherapy in the first line setting for recurrent lung cancer.  I have recommended  carboplatin and gemcitabine.  I've discussed the rationale, alternatives and potential side effects of treatment with him.  I've given him written information on these 2 agents.  He is agreeable to proceed and has signed an informed consent.  Plan to get this started and we'll check scans following 2 cycles of treatment.  Additionally, we will have him screened for clinical trials and may be contacting him should he qualify and be interested in this.  Multiple questions were answered and he is agreeable with this plan.      Review of Systems   Constitutional: Negative.    HENT: Negative for congestion, mouth sores, rhinorrhea and trouble swallowing.    Respiratory: Positive for cough (improved) and shortness of breath (stable). Negative for chest tightness.    Cardiovascular: Negative for chest pain, palpitations and leg swelling.   Gastrointestinal: Negative for abdominal pain, constipation, diarrhea, nausea and vomiting.   Endocrine: Negative for polyuria.   Genitourinary: Negative for dysuria and hematuria.   Musculoskeletal: Positive for arthralgias (left shoulder discomfort, pre-dates cancer diagnossi).   Skin: Negative for pallor and rash.   Neurological: Negative for dizziness, weakness and headaches.   Hematological: Negative for adenopathy. Does not bruise/bleed easily.   Psychiatric/Behavioral: Negative for dysphoric mood. The patient is not nervous/anxious.        Objective:      Physical Exam   Constitutional: He is oriented to person, place, and time. He appears well-nourished. No distress.   Thin male, presents with wife  ECOG 0   HENT:   Head: Normocephalic.   Mouth/Throat: Oropharynx is clear and moist. No oropharyngeal exudate.   Eyes: Conjunctivae are normal. Pupils are equal, round, and reactive to light. No scleral icterus.   Neck: Normal range of motion. Neck supple. No thyromegaly present.   Cardiovascular: Normal rate, regular rhythm, normal heart sounds and intact distal pulses.     Pulmonary/Chest: Effort normal and breath sounds normal. No respiratory distress.   Left lung clear to auscultation. Diminished breath sounds at right lung base, remainder of lung is clear     Abdominal: Soft. Bowel sounds are normal. He exhibits no distension and no mass. There is no tenderness.   Musculoskeletal: Normal range of motion. He exhibits no edema.   No spinal or paraspinal tenderness to palpation     Lymphadenopathy:     He has no cervical adenopathy.   Neurological: He is alert and oriented to person, place, and time. No cranial nerve deficit.   Skin: Skin is warm and dry. No rash noted. No erythema. No pallor.   Psychiatric: He has a normal mood and affect. His behavior is normal. Thought content normal.   Vitals reviewed.      Assessment:       1. Recurrent non-small cell lung cancer    2. Mediastinal lymphadenopathy        Plan:       Patient tolerating treatment well and will continue with C1D8 Gemcitabine on 6/9.  He will return in 2 weeks with labs in anticipation of next treatment, C2D1 Carbo/Gemcitabine.   He knows to call in interim if any issues.

## 2017-06-09 ENCOUNTER — INFUSION (OUTPATIENT)
Dept: INFUSION THERAPY | Facility: HOSPITAL | Age: 67
End: 2017-06-09
Attending: INTERNAL MEDICINE
Payer: MEDICARE

## 2017-06-09 VITALS
HEIGHT: 62 IN | HEART RATE: 90 BPM | DIASTOLIC BLOOD PRESSURE: 72 MMHG | TEMPERATURE: 98 F | RESPIRATION RATE: 16 BRPM | SYSTOLIC BLOOD PRESSURE: 121 MMHG

## 2017-06-09 DIAGNOSIS — C34.91 PRIMARY CANCER OF RIGHT LUNG: Primary | ICD-10-CM

## 2017-06-09 PROCEDURE — 63600175 PHARM REV CODE 636 W HCPCS: Performed by: INTERNAL MEDICINE

## 2017-06-09 PROCEDURE — 96413 CHEMO IV INFUSION 1 HR: CPT

## 2017-06-09 PROCEDURE — 25000003 PHARM REV CODE 250: Performed by: INTERNAL MEDICINE

## 2017-06-09 PROCEDURE — 96367 TX/PROPH/DG ADDL SEQ IV INF: CPT

## 2017-06-09 RX ADMIN — SODIUM CHLORIDE: 0.9 INJECTION, SOLUTION INTRAVENOUS at 01:06

## 2017-06-09 RX ADMIN — GEMCITABINE HYDROCHLORIDE 1570 MG: 200 INJECTION, POWDER, LYOPHILIZED, FOR SOLUTION INTRAVENOUS at 01:06

## 2017-06-09 RX ADMIN — GRANISETRON HYDROCHLORIDE 1 MG: 0.1 INJECTION, SOLUTION INTRAVENOUS at 01:06

## 2017-06-09 NOTE — PLAN OF CARE
Problem: Chemotherapy Effects (Adult)  Goal: Signs and Symptoms of Listed Potential Problems Will be Absent, Minimized or Managed (Chemotherapy Effects)  Signs and symptoms of listed potential problems will be absent, minimized or managed by discharge/transition of care (reference Chemotherapy Effects (Adult) CPG).   Outcome: Ongoing (interventions implemented as appropriate)  Patient her for D8 Gemzar.  Assessment complete and labs reviwed.  VSS.  Chair reclined and  [place]/snack offered.  No need expressed at this time.  Will continue to monitor.

## 2017-06-09 NOTE — PLAN OF CARE
Problem: Patient Care Overview  Goal: Plan of Care Review  Outcome: Ongoing (interventions implemented as appropriate)  Patient tolerated infusion well.  No reaction suspected.  VSS.  No questions or concerns.  AVS given to patient.  Patient ambulated off unit unassisted.

## 2017-06-16 ENCOUNTER — RESEARCH ENCOUNTER (OUTPATIENT)
Dept: RESEARCH | Facility: HOSPITAL | Age: 67
End: 2017-06-16

## 2017-06-16 NOTE — PROGRESS NOTES
Patient was seen for informed consent process on 6/2/17. It was verbalized by investigator that patient was a Stage IV prior to consenting patient.  Patient is not stage IV disease upon further review of scans and documentation of staging per Dr Garsia.  Therefore patient is a screen fail.

## 2017-06-19 ENCOUNTER — RESEARCH ENCOUNTER (OUTPATIENT)
Dept: RESEARCH | Facility: HOSPITAL | Age: 67
End: 2017-06-19

## 2017-06-19 NOTE — PROGRESS NOTES
Protocol:  Lung MAP  Sub Investigator: Gomez Garsia DO  Treating Physician: Gomez Garsia DO  Pt Initials: AA  Patient ID: [id number]  IRB#: [id number]        Screening/Pre-Screening Eligibility Note     Patient meets all eligibility requirements for S14 Screening registration. Eligibility reviewed by 2 Clinical Research RNs.      Patient has squamous cell carcinoma per pathology report dated 7/28/2016.  Patient was originally staged as a T3N2M0 at an outside facility. Per Dr. Garsia patient is T4 N2 M0. Recurrent IIIB. See Epic note documentation.     Patient completed chemoXRT in a definitive fashion in late October 2016 of Carboplatin and Taxol. He recurred per MD note on May 24, 2017.      Per Dr. Jose, local interpreting pathologist, patient has adequate tissue specimen. See [facility] local pathology review form dated 6/15/17. Patient agreed to have this tissue submitted to [facility] for common broad platform CLIA biomarker profiling as per signed informed consent form on 6/2/17. The block was submitted to [facility] on 6/19/17.     Per pathology report dated 7/28/2016 testing was not performed for EGFR and ALK mutations.      Patients ECOG performance status is 1 per Dr. Delfino SIERRA note on 5/24/17.     He is a 67 yr old male. YOB: 1950.     Patient was offered participation in banking for future use of specimens as per signed informed consent form on 6/02/2017.     Patient was willing to provide smoking history as required by [facility] On Study Form. He is a former smoker. Document uploaded on 6/19/2017.     Patient registered in OPEN on 6/19/2017 per protocol guidelines     Patient was informed of the investigational nature of this study and he signed a copy of informed consent on 6/02/2017 in accordance with institutional and federal guidelines. He was given a copy of signed informed consent.      On Study Form completed and reviewed with Dr. Garsia. Form signed and  dated on 6/19/17 by Dr. Garsia. Patient deemed eligible based on the above screening eligibility criteria.

## 2017-06-21 ENCOUNTER — OFFICE VISIT (OUTPATIENT)
Dept: HEMATOLOGY/ONCOLOGY | Facility: CLINIC | Age: 67
End: 2017-06-21
Payer: MEDICARE

## 2017-06-21 VITALS
DIASTOLIC BLOOD PRESSURE: 71 MMHG | BODY MASS INDEX: 22.42 KG/M2 | HEART RATE: 94 BPM | RESPIRATION RATE: 18 BRPM | OXYGEN SATURATION: 100 % | WEIGHT: 122.56 LBS | TEMPERATURE: 98 F | SYSTOLIC BLOOD PRESSURE: 107 MMHG

## 2017-06-21 DIAGNOSIS — R59.0 MEDIASTINAL LYMPHADENOPATHY: ICD-10-CM

## 2017-06-21 DIAGNOSIS — Z09 CHEMOTHERAPY FOLLOW-UP EXAMINATION: ICD-10-CM

## 2017-06-21 DIAGNOSIS — C34.91 PRIMARY CANCER OF RIGHT LUNG: Primary | ICD-10-CM

## 2017-06-21 DIAGNOSIS — C34.90 RECURRENT NON-SMALL CELL LUNG CANCER: ICD-10-CM

## 2017-06-21 PROCEDURE — 99214 OFFICE O/P EST MOD 30 MIN: CPT | Mod: S$GLB,,, | Performed by: INTERNAL MEDICINE

## 2017-06-21 PROCEDURE — 99999 PR PBB SHADOW E&M-EST. PATIENT-LVL IV: CPT | Mod: PBBFAC,,, | Performed by: INTERNAL MEDICINE

## 2017-06-21 NOTE — PROGRESS NOTES
PATIENT: Karsten Salazar  MRN: [id number]  DATE: 6/21/2017      Diagnosis:   1. Primary cancer of right lung    2. Recurrent non-small cell lung cancer    3. Mediastinal lymphadenopathy    4. Chemotherapy follow-up examination        Chief Complaint: Lung Cancer      Oncologic History:      Oncologic History Non-small cell lung cancer diagnosed 6/2016     Oncologic Treatment Concurrent chemoXRT with weekly carboplatin and paclitaxel, XRT to a dose of 5840 cGy 10/31/2016  Carboplatin/gemcitabine 6/2/2017     Pathology Squamous cell carcinoma, T3, N2, M0 (6/2016), PD-L1 TPS 1/%        Subjective:    Interval History: Mr. Salazar is a 67 y.o. male who is seen in follow-up for lung cancer.  Since I had seen him last he was started on chemotherapy with carboplatin and gemcitabine.  He states he is tolerated this well.  Still complains of cough, shortness breath and also chest pain all of which are unchanged.  He has no other new complaints.    He has previously seen Dr. Ubaldo Thomason at [facility].  His history dates to 6/2016 when he sought medical attention for shortness of breath and cough.  He was found to have a large lung mass in the right upper lobe with mediastinal lymphadenopathy.  He underwent subsequent workup, however, symptoms worsened and progressed to him having a cardiac arrest requiring CPR and prolonged ICU stay.  Eventually a biopsy was performed showing squamous cell lung cancer.  This was initially staged as a T3, N2.  He was treated with concurrent chemotherapy and radiation until 10/31/16.  He is an on surveillance since this time.  His insurance changed and is now following and [facility].  Repeat PET/CT had shown locally recurrent disease.  MRI of the brain was negative.  He was started on carboplatin and gemcitabine on 6/2/17      Past Medical History:   Past Medical History:   Diagnosis Date    History of cardiac arrest     Hyperlipidemia     Hypertension     Mediastinal lymphadenopathy 5/24/2017     Primary cancer of right lung 4/6/2017    Recurrent non-small cell lung cancer 5/24/2017       Past Surgical HIstory:   Past Surgical History:   Procedure Laterality Date    HERNIA REPAIR         Family History:   Family History   Problem Relation Age of Onset    Cancer Mother        Social History:  reports that he has quit smoking. His smoking use included Cigarettes. He has a 72.00 pack-year smoking history. He does not have any smokeless tobacco history on file. He reports that he drinks about 10.2 oz of alcohol per week . He reports that he does not use drugs.    Allergies:  Review of patient's allergies indicates:  No Known Allergies    Medications:  Current Outpatient Prescriptions   Medication Sig Dispense Refill    albuterol 90 mcg/actuation inhaler Inhale 2 puffs into the lungs.      albuterol-ipratropium 2.5mg-0.5mg/3mL (DUO-NEB) 0.5 mg-3 mg(2.5 mg base)/3 mL nebulizer solution Inhale 3 mLs into the lungs.      aspirin (ECOTRIN) 81 MG EC tablet Take 81 mg by mouth.      atorvastatin (LIPITOR) 40 MG tablet Take 40 mg by mouth.      benzonatate (TESSALON) 100 MG capsule Take 100 mg by mouth.      carvedilol (COREG) 3.125 MG tablet TAKE 1 TABLET BY MOUTH TWICE DAILY 180 tablet 0    carvedilol (COREG) 3.125 MG tablet TAKE 1 TABLET BY MOUTH TWICE DAILY 60 tablet 0    guaifenesin-codeine 100-10 mg/5 ml (TUSSI-ORGANIDIN NR)  mg/5 mL syrup Take 5 mLs by mouth 3 (three) times daily as needed for Cough.      lisinopril (PRINIVIL,ZESTRIL) 2.5 MG tablet Take 2.5 mg by mouth.      morphine (MS CONTIN) 15 MG 12 hr tablet Take 15 mg by mouth.      ondansetron (ZOFRAN, AS HYDROCHLORIDE,) 4 MG tablet Take 1 tablet (4 mg total) by mouth every 8 (eight) hours as needed for Nausea. 60 tablet 1    oxycodone-acetaminophen (PERCOCET) 5-325 mg per tablet Take 1 tablet by mouth.      tiotropium (SPIRIVA) 18 mcg inhalation capsule Inhale 1 capsule (18 mcg total) into the lungs once daily. 30 capsule 1     No  current facility-administered medications for this visit.        Review of Systems   Constitutional: Negative for appetite change, chills, fatigue, fever and unexpected weight change.   HENT: Negative for dental problem, sinus pressure and sneezing.    Eyes: Negative for visual disturbance.   Respiratory: Positive for cough and shortness of breath. Negative for choking and chest tightness.    Cardiovascular: Positive for chest pain. Negative for leg swelling.   Gastrointestinal: Negative for abdominal pain, blood in stool, constipation, diarrhea and nausea.   Genitourinary: Negative for difficulty urinating and dysuria.   Musculoskeletal: Negative for arthralgias and back pain.   Skin: Negative for rash and wound.   Neurological: Negative for dizziness, light-headedness and headaches.   Hematological: Negative for adenopathy. Does not bruise/bleed easily.   Psychiatric/Behavioral: Negative for sleep disturbance. The patient is not nervous/anxious.        ECOG Performance Status:   ECOG SCORE    1 - Restricted in strenuous activity-ambulatory and able to carry out work of a light nature          Objective:      Vitals:   Vitals:    06/21/17 1029   BP: 107/71   BP Location: Right arm   Patient Position: Sitting   BP Method: Automatic   Pulse: 94   Resp: 18   Temp: 98 °F (36.7 °C)   TempSrc: Oral   SpO2: 100%   Weight: 55.6 kg (122 lb 9.2 oz)     BMI: Body mass index is 22.42 kg/m².    Physical Exam   Constitutional: He is oriented to person, place, and time. He appears well-developed and well-nourished.   HENT:   Head: Normocephalic and atraumatic.   Eyes: Pupils are equal, round, and reactive to light.   Neck: Normal range of motion. Neck supple.   Cardiovascular: Normal rate and regular rhythm.    Pulmonary/Chest: Effort normal and breath sounds normal. No respiratory distress.   Abdominal: Soft. He exhibits no distension. There is no tenderness.   Musculoskeletal: He exhibits no edema or tenderness.    Lymphadenopathy:     He has no cervical adenopathy.   Neurological: He is alert and oriented to person, place, and time. No cranial nerve deficit.   Skin: Skin is warm and dry.   Psychiatric: He has a normal mood and affect. His behavior is normal.       Laboratory Data:  Lab Visit on 06/21/2017   Component Date Value Ref Range Status    WBC 06/21/2017 2.45* 3.90 - 12.70 K/uL Final    RBC 06/21/2017 3.54* 4.60 - 6.20 M/uL Final    Hemoglobin 06/21/2017 10.0* 14.0 - 18.0 g/dL Final    Hematocrit 06/21/2017 31.0* 40.0 - 54.0 % Final    MCV 06/21/2017 88  82 - 98 fL Final    MCH 06/21/2017 28.2  27.0 - 31.0 pg Final    MCHC 06/21/2017 32.3  32.0 - 36.0 % Final    RDW 06/21/2017 12.7  11.5 - 14.5 % Final    Platelets 06/21/2017 265  150 - 350 K/uL Final    MPV 06/21/2017 9.3  9.2 - 12.9 fL Final    Gran # 06/21/2017 1.0* 1.8 - 7.7 K/uL Final    Comment: The ANC is based on a white cell differential from an   automated cell counter. It has not been microscopically   reviewed for the presence of abnormal cells. Clinical   correlation is required.      Sodium 06/21/2017 131* 136 - 145 mmol/L Final    Potassium 06/21/2017 5.1  3.5 - 5.1 mmol/L Final    Chloride 06/21/2017 97  95 - 110 mmol/L Final    CO2 06/21/2017 25  23 - 29 mmol/L Final    Glucose 06/21/2017 110  70 - 110 mg/dL Final    BUN, Bld 06/21/2017 11  8 - 23 mg/dL Final    Creatinine 06/21/2017 1.4  0.5 - 1.4 mg/dL Final    Calcium 06/21/2017 9.7  8.7 - 10.5 mg/dL Final    Total Protein 06/21/2017 8.1  6.0 - 8.4 g/dL Final    Albumin 06/21/2017 3.4* 3.5 - 5.2 g/dL Final    Total Bilirubin 06/21/2017 0.4  0.1 - 1.0 mg/dL Final    Comment: For infants and newborns, interpretation of results should be based  on gestational age, weight and in agreement with clinical  observations.  Premature Infant recommended reference ranges:  Up to 24 hours.............<8.0 mg/dL  Up to 48 hours............<12.0 mg/dL  3-5 days..................<15.0  mg/dL  6-29 days.................<15.0 mg/dL      Alkaline Phosphatase 06/21/2017 114  55 - 135 U/L Final    AST 06/21/2017 20  10 - 40 U/L Final    ALT 06/21/2017 18  10 - 44 U/L Final    Anion Gap 06/21/2017 9  8 - 16 mmol/L Final    eGFR if African American 06/21/2017 >60.0  >60 mL/min/1.73 m^2 Final    eGFR if non African American 06/21/2017 52.4* >60 mL/min/1.73 m^2 Final    Comment: Calculation used to obtain the estimated glomerular filtration  rate (eGFR) is the CKD-EPI equation. Since race is unknown   in our information system, the eGFR values for   -American and Non--American patients are given   for each creatinine result.           Imaging:  PET/CT 4/18/17  Findings:     Positron emission tomography images demonstrate physiologic head, neck, cardiac, GI and  activity. There is an avidly hypermetabolic mass in the right lower lobe which measures approximately 2.5 x 2.0 cm and has SUV max of 14.9 compatible with malignancy. A centrally necrotic mass is noted in the right upper lobe abutting the mediastinum and pleura with SUV max 5.2 compatible with an additional focus of malignancy.    Noncontrast CT images demonstrate patchy ground glass and consolidative density in the right lung as well as a small right pleural effusion. There is calcific atherosclerosis of the coronary arteries and aorta. There are atrophic changes of the pancreas with calcifications in the pancreatic head likely reflecting sequela of chronic pancreatitis. Multiple calcified gallstones are noted within the lumen of the gallbladder without evidence of inflammation. Postoperative changes are noted to the right hip with orthopedic metallic hardware. Osseous structures demonstrate degenerative changes in the spine. There is interposition of the colon between the right hemidiaphragm/right abdominal wall and the liver.   Impression          Avidly hypermetabolic lesion in the right lower lobe is compatible with  pulmonary malignancy. A partially necrotic hypermetabolic mass in the right upper lobe abutting the pleura suggestive of a second focus of malignancy.    No evidence of distant metastatic disease.  ______________________________________                Assessment:       1. Primary cancer of right lung    2. Recurrent non-small cell lung cancer    3. Mediastinal lymphadenopathy    4. Chemotherapy follow-up examination           Plan:     Mr. Salazar has started on chemotherapy and tolerating well.  It is noted that his ANC is 1000 because of this we'll hold chemotherapy this week.  Repeat labs in 1 week for anticipation of chemotherapy next week.  Follow back up with me with day #8 cycle 2.  All questions were answered and he is agreeable with this plan.    Gomez Garsia DO, FACP  Hematology & Oncology  [facility] [place] [address]  ph. [phone]  Fax. [phone]    25 minutes were spent in coordination of patient's care, record review and counseling.  More than 50% of the time was face-to-face.

## 2017-06-21 NOTE — Clinical Note
Hold chemotherapy this week Labs in 1 week in anticipation of chemotherapy June 30 Follow-up the following week

## 2017-06-22 ENCOUNTER — TELEPHONE (OUTPATIENT)
Dept: ADMINISTRATIVE | Facility: OTHER | Age: 67
End: 2017-06-22

## 2017-06-22 NOTE — TELEPHONE ENCOUNTER
----- Message from Gomez Garsia DO, FACP sent at 6/21/2017 11:01 AM CDT -----  Hold chemotherapy this week  Labs in 1 week in anticipation of chemotherapy June 30  Follow-up the following week

## 2017-06-28 ENCOUNTER — OFFICE VISIT (OUTPATIENT)
Dept: HEMATOLOGY/ONCOLOGY | Facility: CLINIC | Age: 67
End: 2017-06-28
Payer: MEDICARE

## 2017-06-28 VITALS
BODY MASS INDEX: 22.5 KG/M2 | DIASTOLIC BLOOD PRESSURE: 58 MMHG | RESPIRATION RATE: 18 BRPM | OXYGEN SATURATION: 100 % | HEART RATE: 83 BPM | TEMPERATURE: 98 F | SYSTOLIC BLOOD PRESSURE: 101 MMHG | WEIGHT: 123 LBS

## 2017-06-28 DIAGNOSIS — R59.0 MEDIASTINAL LYMPHADENOPATHY: ICD-10-CM

## 2017-06-28 DIAGNOSIS — Z09 CHEMOTHERAPY FOLLOW-UP EXAMINATION: ICD-10-CM

## 2017-06-28 DIAGNOSIS — C34.91 PRIMARY CANCER OF RIGHT LUNG: Primary | ICD-10-CM

## 2017-06-28 DIAGNOSIS — C34.90 RECURRENT NON-SMALL CELL LUNG CANCER: ICD-10-CM

## 2017-06-28 PROCEDURE — 99999 PR PBB SHADOW E&M-EST. PATIENT-LVL IV: CPT | Mod: PBBFAC,,, | Performed by: INTERNAL MEDICINE

## 2017-06-28 PROCEDURE — 99214 OFFICE O/P EST MOD 30 MIN: CPT | Mod: S$GLB,,, | Performed by: INTERNAL MEDICINE

## 2017-06-28 RX ORDER — HEPARIN 100 UNIT/ML
500 SYRINGE INTRAVENOUS
Status: CANCELLED | OUTPATIENT
Start: 2017-07-01

## 2017-06-28 RX ORDER — SODIUM CHLORIDE 0.9 % (FLUSH) 0.9 %
10 SYRINGE (ML) INJECTION
Status: CANCELLED | OUTPATIENT
Start: 2017-06-28

## 2017-06-28 RX ORDER — HEPARIN 100 UNIT/ML
500 SYRINGE INTRAVENOUS
Status: CANCELLED | OUTPATIENT
Start: 2017-06-28

## 2017-06-28 RX ORDER — SODIUM CHLORIDE 0.9 % (FLUSH) 0.9 %
10 SYRINGE (ML) INJECTION
Status: CANCELLED | OUTPATIENT
Start: 2017-07-01

## 2017-06-28 NOTE — PROGRESS NOTES
PATIENT: Karsten Salazar  MRN: [id number]  DATE: 6/28/2017      Diagnosis:   1. Primary cancer of right lung    2. Recurrent non-small cell lung cancer    3. Mediastinal lymphadenopathy    4. Chemotherapy follow-up examination        Chief Complaint: No chief complaint on file.      Oncologic History:      Oncologic History Non-small cell lung cancer diagnosed 6/2016   Recurrent disease to lung 5/2017    Oncologic Treatment Concurrent chemoXRT with weekly carboplatin and paclitaxel, XRT to a dose of 5840 cGy 10/31/2016  Carboplatin/gemcitabine 6/2/2017     Pathology Squamous cell carcinoma, T3, N2, M0 (6/2016), PD-L1 TPS 1/%        Subjective:    Interval History: Mr. Salazar is a 67 y.o. male who is seen in follow-up for lung cancer.  He states he has ongoing shortness of breath and cough which is relatively unchanged.  His chest pain is also unchanged.  He is without other new complaints.    He has previously seen Dr. Ubaldo Thomason at [facility].  His history dates to 6/2016 when he sought medical attention for shortness of breath and cough.  He was found to have a large lung mass in the right upper lobe with mediastinal lymphadenopathy.  He underwent subsequent workup, however, symptoms worsened and progressed to him having a cardiac arrest requiring CPR and prolonged ICU stay.  Eventually a biopsy was performed showing squamous cell lung cancer.  This was initially staged as a T3, N2.  He was treated with concurrent chemotherapy and radiation until 10/31/16.  He is an on surveillance since this time.  His insurance changed and is now following and [facility].  Repeat PET/CT had shown locally recurrent disease.  MRI of the brain was negative.  He was started on carboplatin and gemcitabine on 6/2/17      Past Medical History:   Past Medical History:   Diagnosis Date    History of cardiac arrest     Hyperlipidemia     Hypertension     Mediastinal lymphadenopathy 5/24/2017    Primary cancer of right lung 4/6/2017     Recurrent non-small cell lung cancer 5/24/2017       Past Surgical HIstory:   Past Surgical History:   Procedure Laterality Date    HERNIA REPAIR         Family History:   Family History   Problem Relation Age of Onset    Cancer Mother        Social History:  reports that he has quit smoking. His smoking use included Cigarettes. He has a 72.00 pack-year smoking history. He does not have any smokeless tobacco history on file. He reports that he drinks about 10.2 oz of alcohol per week . He reports that he does not use drugs.    Allergies:  Review of patient's allergies indicates:  No Known Allergies    Medications:  Current Outpatient Prescriptions   Medication Sig Dispense Refill    albuterol 90 mcg/actuation inhaler Inhale 2 puffs into the lungs.      albuterol-ipratropium 2.5mg-0.5mg/3mL (DUO-NEB) 0.5 mg-3 mg(2.5 mg base)/3 mL nebulizer solution Inhale 3 mLs into the lungs.      aspirin (ECOTRIN) 81 MG EC tablet Take 81 mg by mouth.      atorvastatin (LIPITOR) 40 MG tablet Take 40 mg by mouth.      benzonatate (TESSALON) 100 MG capsule Take 100 mg by mouth.      carvedilol (COREG) 3.125 MG tablet TAKE 1 TABLET BY MOUTH TWICE DAILY 180 tablet 0    carvedilol (COREG) 3.125 MG tablet TAKE 1 TABLET BY MOUTH TWICE DAILY 60 tablet 0    guaifenesin-codeine 100-10 mg/5 ml (TUSSI-ORGANIDIN NR)  mg/5 mL syrup Take 5 mLs by mouth 3 (three) times daily as needed for Cough.      lisinopril (PRINIVIL,ZESTRIL) 2.5 MG tablet Take 2.5 mg by mouth.      morphine (MS CONTIN) 15 MG 12 hr tablet Take 15 mg by mouth.      ondansetron (ZOFRAN, AS HYDROCHLORIDE,) 4 MG tablet Take 1 tablet (4 mg total) by mouth every 8 (eight) hours as needed for Nausea. 60 tablet 1    oxycodone-acetaminophen (PERCOCET) 5-325 mg per tablet Take 1 tablet by mouth.      tiotropium (SPIRIVA) 18 mcg inhalation capsule Inhale 1 capsule (18 mcg total) into the lungs once daily. 30 capsule 1     No current facility-administered medications  for this visit.        Review of Systems   Constitutional: Negative for appetite change, chills, fatigue, fever and unexpected weight change.   HENT: Negative for dental problem, sinus pressure and sneezing.    Eyes: Negative for visual disturbance.   Respiratory: Positive for cough and shortness of breath. Negative for choking and chest tightness.    Cardiovascular: Positive for chest pain. Negative for leg swelling.   Gastrointestinal: Negative for abdominal pain, blood in stool, constipation, diarrhea and nausea.   Genitourinary: Negative for difficulty urinating and dysuria.   Musculoskeletal: Negative for arthralgias and back pain.   Skin: Negative for rash and wound.   Neurological: Negative for dizziness, light-headedness and headaches.   Hematological: Negative for adenopathy. Does not bruise/bleed easily.   Psychiatric/Behavioral: Negative for sleep disturbance. The patient is not nervous/anxious.        ECOG Performance Status:   ECOG SCORE    1 - Restricted in strenuous activity-ambulatory and able to carry out work of a light nature         Objective:      Vitals:   Vitals:    06/28/17 0950   BP: (!) 101/58   BP Location: Right arm   Patient Position: Sitting   BP Method: Automatic   Pulse: 83   Resp: 18   Temp: 98 °F (36.7 °C)   TempSrc: Oral   SpO2: 100%   Weight: 55.8 kg (123 lb 0.3 oz)     BMI: Body mass index is 22.5 kg/m².    Physical Exam   Constitutional: He is oriented to person, place, and time. He appears well-developed and well-nourished.   HENT:   Head: Normocephalic and atraumatic.   Eyes: Pupils are equal, round, and reactive to light.   Neck: Normal range of motion. Neck supple.   Cardiovascular: Normal rate and regular rhythm.    Pulmonary/Chest: Effort normal and breath sounds normal. No respiratory distress.   Abdominal: Soft. He exhibits no distension. There is no tenderness.   Musculoskeletal: He exhibits no edema or tenderness.   Lymphadenopathy:     He has no cervical adenopathy.    Neurological: He is alert and oriented to person, place, and time. No cranial nerve deficit.   Skin: Skin is warm and dry.   Psychiatric: He has a normal mood and affect. His behavior is normal.       Laboratory Data:  Lab Visit on 06/28/2017   Component Date Value Ref Range Status    WBC 06/28/2017 4.88  3.90 - 12.70 K/uL Final    RBC 06/28/2017 3.16* 4.60 - 6.20 M/uL Final    Hemoglobin 06/28/2017 9.3* 14.0 - 18.0 g/dL Final    Hematocrit 06/28/2017 27.9* 40.0 - 54.0 % Final    MCV 06/28/2017 88  82 - 98 fL Final    MCH 06/28/2017 29.4  27.0 - 31.0 pg Final    MCHC 06/28/2017 33.3  32.0 - 36.0 % Final    RDW 06/28/2017 13.7  11.5 - 14.5 % Final    Platelets 06/28/2017 856* 150 - 350 K/uL Final    MPV 06/28/2017 9.0* 9.2 - 12.9 fL Final    Gran # 06/28/2017 1.8  1.8 - 7.7 K/uL Final    Comment: The ANC is based on a white cell differential from an   automated cell counter. It has not been microscopically   reviewed for the presence of abnormal cells. Clinical   correlation is required.      Sodium 06/28/2017 127* 136 - 145 mmol/L Final    Potassium 06/28/2017 5.2* 3.5 - 5.1 mmol/L Final    Chloride 06/28/2017 94* 95 - 110 mmol/L Final    CO2 06/28/2017 26  23 - 29 mmol/L Final    Glucose 06/28/2017 112* 70 - 110 mg/dL Final    BUN, Bld 06/28/2017 14  8 - 23 mg/dL Final    Creatinine 06/28/2017 1.2  0.5 - 1.4 mg/dL Final    Calcium 06/28/2017 9.6  8.7 - 10.5 mg/dL Final    Total Protein 06/28/2017 8.1  6.0 - 8.4 g/dL Final    Albumin 06/28/2017 3.3* 3.5 - 5.2 g/dL Final    Total Bilirubin 06/28/2017 0.4  0.1 - 1.0 mg/dL Final    Comment: For infants and newborns, interpretation of results should be based  on gestational age, weight and in agreement with clinical  observations.  Premature Infant recommended reference ranges:  Up to 24 hours.............<8.0 mg/dL  Up to 48 hours............<12.0 mg/dL  3-5 days..................<15.0 mg/dL  6-29 days.................<15.0 mg/dL      Alkaline  Phosphatase 06/28/2017 100  55 - 135 U/L Final    AST 06/28/2017 21  10 - 40 U/L Final    ALT 06/28/2017 13  10 - 44 U/L Final    Anion Gap 06/28/2017 7* 8 - 16 mmol/L Final    eGFR if African American 06/28/2017 >60.0  >60 mL/min/1.73 m^2 Final    eGFR if non African American 06/28/2017 >60.0  >60 mL/min/1.73 m^2 Final    Comment: Calculation used to obtain the estimated glomerular filtration  rate (eGFR) is the CKD-EPI equation. Since race is unknown   in our information system, the eGFR values for   -American and Non--American patients are given   for each creatinine result.           Imaging:  PET/CT 4/18/17  Findings:     Positron emission tomography images demonstrate physiologic head, neck, cardiac, GI and  activity. There is an avidly hypermetabolic mass in the right lower lobe which measures approximately 2.5 x 2.0 cm and has SUV max of 14.9 compatible with malignancy. A centrally necrotic mass is noted in the right upper lobe abutting the mediastinum and pleura with SUV max 5.2 compatible with an additional focus of malignancy.    Noncontrast CT images demonstrate patchy ground glass and consolidative density in the right lung as well as a small right pleural effusion. There is calcific atherosclerosis of the coronary arteries and aorta. There are atrophic changes of the pancreas with calcifications in the pancreatic head likely reflecting sequela of chronic pancreatitis. Multiple calcified gallstones are noted within the lumen of the gallbladder without evidence of inflammation. Postoperative changes are noted to the right hip with orthopedic metallic hardware. Osseous structures demonstrate degenerative changes in the spine. There is interposition of the colon between the right hemidiaphragm/right abdominal wall and the liver.   Impression          Avidly hypermetabolic lesion in the right lower lobe is compatible with pulmonary malignancy. A partially necrotic hypermetabolic mass in  the right upper lobe abutting the pleura suggestive of a second focus of malignancy.    No evidence of distant metastatic disease.  ______________________________________                Assessment:       1. Primary cancer of right lung    2. Recurrent non-small cell lung cancer    3. Mediastinal lymphadenopathy    4. Chemotherapy follow-up examination           Plan:     Mr. Salazar has completed 1 cycle of chemotherapy we'll proceed onto cycle 2 later this week.  Labs are appropriate.  He will need repeat labs prior to day 8 chemotherapy.  Plan to repeat CT scan prior to seeing him back again in 3 weeks.  If improvement will continue on her another 2 cycles of chemotherapy.  If he has evidence of progressive disease he may be a candidate for the LUNG MAP trial.    Gomez Garsia DO, FACP  Hematology & Oncology  [facility] [place] [address]  ph. [phone]  Fax. [phone]    25 minutes were spent in coordination of patient's care, record review and counseling.  More than 50% of the time was face-to-face.

## 2017-06-30 ENCOUNTER — INFUSION (OUTPATIENT)
Dept: INFUSION THERAPY | Facility: HOSPITAL | Age: 67
End: 2017-06-30
Attending: INTERNAL MEDICINE
Payer: MEDICARE

## 2017-06-30 VITALS
SYSTOLIC BLOOD PRESSURE: 129 MMHG | DIASTOLIC BLOOD PRESSURE: 66 MMHG | HEART RATE: 86 BPM | TEMPERATURE: 98 F | RESPIRATION RATE: 16 BRPM

## 2017-06-30 DIAGNOSIS — C34.91 PRIMARY CANCER OF RIGHT LUNG: Primary | ICD-10-CM

## 2017-06-30 PROCEDURE — 96413 CHEMO IV INFUSION 1 HR: CPT

## 2017-06-30 PROCEDURE — 25000003 PHARM REV CODE 250: Performed by: INTERNAL MEDICINE

## 2017-06-30 PROCEDURE — 63600175 PHARM REV CODE 636 W HCPCS: Performed by: INTERNAL MEDICINE

## 2017-06-30 PROCEDURE — 96417 CHEMO IV INFUS EACH ADDL SEQ: CPT

## 2017-06-30 PROCEDURE — 96367 TX/PROPH/DG ADDL SEQ IV INF: CPT

## 2017-06-30 RX ORDER — SODIUM CHLORIDE 0.9 % (FLUSH) 0.9 %
10 SYRINGE (ML) INJECTION
Status: DISCONTINUED | OUTPATIENT
Start: 2017-06-30 | End: 2017-06-30 | Stop reason: HOSPADM

## 2017-06-30 RX ORDER — HEPARIN 100 UNIT/ML
500 SYRINGE INTRAVENOUS
Status: DISCONTINUED | OUTPATIENT
Start: 2017-06-30 | End: 2017-06-30 | Stop reason: HOSPADM

## 2017-06-30 RX ADMIN — CARBOPLATIN 370 MG: 10 INJECTION, SOLUTION INTRAVENOUS at 02:06

## 2017-06-30 RX ADMIN — DEXAMETHASONE SODIUM PHOSPHATE: 4 INJECTION, SOLUTION INTRAMUSCULAR; INTRAVENOUS at 01:06

## 2017-06-30 RX ADMIN — SODIUM CHLORIDE: 0.9 INJECTION, SOLUTION INTRAVENOUS at 01:06

## 2017-06-30 RX ADMIN — GEMCITABINE HYDROCHLORIDE 1570 MG: 200 INJECTION, POWDER, LYOPHILIZED, FOR SOLUTION INTRAVENOUS at 02:06

## 2017-06-30 NOTE — PLAN OF CARE
Problem: Patient Care Overview  Goal: Plan of Care Review  Outcome: Ongoing (interventions implemented as appropriate)  Patient tolerated treatment well.  No reaction suspected.  VSS.  No questions or concerns.  AVS given to patient.  Patient ambulated off unit unassisted.

## 2017-06-30 NOTE — PLAN OF CARE
Problem: Chemotherapy Effects (Adult)  Goal: Signs and Symptoms of Listed Potential Problems Will be Absent, Minimized or Managed (Chemotherapy Effects)  Signs and symptoms of listed potential problems will be absent, minimized or managed by discharge/transition of care (reference Chemotherapy Effects (Adult) CPG).   Outcome: Ongoing (interventions implemented as appropriate)  Patient here for Gemzar/Carbo.  Assessment complete and labs reviewed.  VSS.  Chair reclined and blanket offered.  No needs expressed at this time.  Will continue to monitor.

## 2017-07-07 ENCOUNTER — INFUSION (OUTPATIENT)
Dept: INFUSION THERAPY | Facility: HOSPITAL | Age: 67
End: 2017-07-07
Attending: INTERNAL MEDICINE
Payer: MEDICARE

## 2017-07-07 VITALS
TEMPERATURE: 98 F | HEART RATE: 86 BPM | RESPIRATION RATE: 14 BRPM | SYSTOLIC BLOOD PRESSURE: 123 MMHG | DIASTOLIC BLOOD PRESSURE: 65 MMHG

## 2017-07-07 DIAGNOSIS — C34.91 PRIMARY CANCER OF RIGHT LUNG: Primary | ICD-10-CM

## 2017-07-07 PROCEDURE — 63600175 PHARM REV CODE 636 W HCPCS: Performed by: INTERNAL MEDICINE

## 2017-07-07 PROCEDURE — 25000003 PHARM REV CODE 250: Performed by: INTERNAL MEDICINE

## 2017-07-07 PROCEDURE — 96367 TX/PROPH/DG ADDL SEQ IV INF: CPT

## 2017-07-07 PROCEDURE — 96413 CHEMO IV INFUSION 1 HR: CPT

## 2017-07-07 RX ORDER — SODIUM CHLORIDE 0.9 % (FLUSH) 0.9 %
10 SYRINGE (ML) INJECTION
Status: DISCONTINUED | OUTPATIENT
Start: 2017-07-07 | End: 2017-07-07 | Stop reason: HOSPADM

## 2017-07-07 RX ADMIN — SODIUM CHLORIDE: 0.9 INJECTION, SOLUTION INTRAVENOUS at 02:07

## 2017-07-07 RX ADMIN — GEMCITABINE 1570 MG: 1 INJECTION, POWDER, LYOPHILIZED, FOR SOLUTION INTRAVENOUS at 02:07

## 2017-07-07 RX ADMIN — GRANISETRON HYDROCHLORIDE 1 MG: 0.1 INJECTION, SOLUTION INTRAVENOUS at 02:07

## 2017-07-07 RX ADMIN — SODIUM CHLORIDE, PRESERVATIVE FREE 10 ML: 5 INJECTION INTRAVENOUS at 03:07

## 2017-07-07 NOTE — PLAN OF CARE
Problem: Patient Care Overview  Goal: Individualization & Mutuality  PIV  Warm blankets   Outcome: Ongoing (interventions implemented as appropriate)  Labs , hx, and medications reviewed. Assessment completed. Discussed plan of care with patient. Patient in agreement. Chair reclined and warm blanket and snack offered.

## 2017-07-07 NOTE — PLAN OF CARE
Problem: Patient Care Overview  Goal: Discharge Needs Assessment  Outcome: Ongoing (interventions implemented as appropriate)  Patient tolerated treatment well. Discharged without complaints or S/S of adverse event. AVS placed in mail.  Instructed to call provider for any questions or concerns.

## 2017-07-18 ENCOUNTER — TELEPHONE (OUTPATIENT)
Dept: RADIOLOGY | Facility: HOSPITAL | Age: 67
End: 2017-07-18

## 2017-07-18 NOTE — PROGRESS NOTES
Chief Complaint: Non-small cell lung cancer      Diagnosis:     1. Primary cancer of right lung    2. Recurrent non-small cell lung cancer    3. Mediastinal lymphadenopathy    4. Chemotherapy follow-up examination            Oncologic History:       Oncologic History Non-small cell lung cancer diagnosed 6/2016   Recurrent disease to lung 5/2017    Oncologic Treatment Concurrent chemoXRT with weekly carboplatin and paclitaxel, XRT to a dose of 5840 cGy 10/31/2016  Carboplatin/gemcitabine 6/2/2017     Pathology Squamous cell carcinoma, T3, N2, M0 (6/2016), PD-L1 TPS 1/%     Interval History: Mr. Salazar is a 67 y.o. male who is seen in follow-up for lung cancer.  He states he has ongoing shortness of breath and cough which is relatively unchanged.  His chest pain is also unchanged.  He is without other new complaints.     He has previously seen Dr. Ubaldo Thomason at [facility].  His history dates to 6/2016 when he sought medical attention for shortness of breath and cough.  He was found to have a large lung mass in the right upper lobe with mediastinal lymphadenopathy.  He underwent subsequent workup, however, symptoms worsened and progressed to him having a cardiac arrest requiring CPR and prolonged ICU stay.  Eventually a biopsy was performed showing squamous cell lung cancer.  This was initially staged as a T3, N2.  He was treated with concurrent chemotherapy and radiation until 10/31/16.  His insurance changed and is now following and [facility].  Repeat PET/CT had shown locally recurrent disease.  MRI of the brain was negative.  He was started on carboplatin and gemcitabine on 6/2/17    Review of Systems   Constitutional: Positive for malaise/fatigue and weight loss. Negative for chills and fever.   HENT: Negative for hearing loss and nosebleeds.    Eyes: Negative for blurred vision, double vision and discharge.   Respiratory: Positive for cough, sputum production and shortness of breath. Negative for hemoptysis.     Cardiovascular: Negative for chest pain, palpitations and leg swelling.   Gastrointestinal: Negative for diarrhea, heartburn and nausea.   Genitourinary: Negative for dysuria.   Neurological: Negative for dizziness, tremors, sensory change and headaches.   Endo/Heme/Allergies: Does not bruise/bleed easily.   Psychiatric/Behavioral: Negative for depression.   All other systems reviewed and are negative.        Current Outpatient Prescriptions   Medication Sig    albuterol 90 mcg/actuation inhaler Inhale 2 puffs into the lungs.    albuterol-ipratropium 2.5mg-0.5mg/3mL (DUO-NEB) 0.5 mg-3 mg(2.5 mg base)/3 mL nebulizer solution Inhale 3 mLs into the lungs.    aspirin (ECOTRIN) 81 MG EC tablet Take 81 mg by mouth.    atorvastatin (LIPITOR) 40 MG tablet Take 40 mg by mouth.    benzonatate (TESSALON) 100 MG capsule Take 100 mg by mouth.    carvedilol (COREG) 3.125 MG tablet TAKE 1 TABLET BY MOUTH TWICE DAILY    carvedilol (COREG) 3.125 MG tablet TAKE 1 TABLET BY MOUTH TWICE DAILY    guaifenesin-codeine 100-10 mg/5 ml (TUSSI-ORGANIDIN NR)  mg/5 mL syrup Take 5 mLs by mouth 3 (three) times daily as needed for Cough.    lisinopril (PRINIVIL,ZESTRIL) 2.5 MG tablet Take 2.5 mg by mouth.    morphine (MS CONTIN) 15 MG 12 hr tablet Take 15 mg by mouth.    ondansetron (ZOFRAN, AS HYDROCHLORIDE,) 4 MG tablet Take 1 tablet (4 mg total) by mouth every 8 (eight) hours as needed for Nausea.    oxycodone-acetaminophen (PERCOCET) 5-325 mg per tablet Take 1 tablet by mouth.    tiotropium (SPIRIVA) 18 mcg inhalation capsule Inhale 1 capsule (18 mcg total) into the lungs once daily.     No current facility-administered medications for this visit.      Imaging: CT C/A/P w.cont 7/19/17      CT CHEST, ABDOMEN, and, PELVIS  with IV contrast    COMPARISON: CT outside hospital 03/08/2017 and 11/29/2016     FINDINGS:     Aorta: Normal in course and caliber, without significant atherosclerotic plaque. There are three branching  vessels at the arch.       Heart: Normal in size.  Small pericardial effusion. There is calcification of the aortic valve.  There is calcific coronary atherosclerosis.    Anna Marie/Mediastinum: No significant lymphadenopathy     Lungs: There is evidence of a centrally necrotic mass within the right upper lobe, measuring 5.6 cm in its greatest diameter (axial series 2 image 17), compared to 5.8 cm on most recent prior imaging.  There is circumferential pleural thickening surrounding the right upper lobe, with evidence of a small volume of right-sided pleural fluid.  There is surrounding consolidation and groundglass opacities within the right upper lobe, middle lobe and right lower lobe, possibly reflecting changes of radiation therapy.  There is narrowing of the right upper lobe bronchus and vessels.    There has been interval enlargement of mass within the medial basal segment of the right lower lobe, measuring 2.8 cm (axial series 2 image 34), compared to 1.7 cm previously.  This mass has a decreased attenuation centrally, possibly reflecting presence of necrosis.        There is stable   fibrocalcific scar within the left upper lobe anteriorly.  There are no new focal masses, consolidations or nodules.  There are emphysematous changes of the lungs bilaterally.  There are left-sided apical blebs.     Liver: Normal in size and attenuation, with no focal hepatic lesions. There is interposition in of the colon along the posterior-lateral aspect of the liver.      Gallbladder: There are multiple gallstones, within a nondistended gallbladder.  No evidence of cholecystitis.     Bile Ducts: No evidence of dilated ducts.     Pancreas: There are multiple dystrophic calcifications within the head and uncinate process of the pancreas, suggestive of chronic pancreatitis.      Spleen: Unremarkable.     Adrenals: Unremarkable.     Kidneys/ Ureters: Normal in size and location. Normal concentration and excretion of contrast. No  hydronephrosis or nephrolithiasis. No ureteral dilatation.     Bladder: No evidence of wall thickening.     Reproductive organs: Unremarkable.     GI Tract/Mesentery: No evidence of bowel obstruction or inflammation.     Peritoneal Space: There is a small volume of ascites within the pelvis. No free air.     Retroperitoneum:  No significant adenopathy.      Abdominal wall:  There is a small right sided inguinal hernia, containing a loop of small bowel.  No inflammatory changes of the bowel within this territory.      Vasculature: Significant calcific atherosclerosis of the aorta, without aneurysmal dilatation.     Bones: There are multiple anterior rib fractures, 2 of which appear chronic and nonhealed bilaterally.  There is also a nonhealed sternal fracture, which is minimally displaced.  These findings may be secondary to prior trauma or chest compressions.  Surgical changes from placement of intramedullary vesna and screw within the right femur, for a fracture Age-appropriate degenerative changes are noted.   Impression        2 right lung masses, consistent with malignancy.  The right upper lobe mass appears stable in size, and there has been interval growth of the right lower lobe mass.    Consolidation and groundglass opacities surrounding the right upper lobe mass, possibly reflecting changes of radiation therapy.  Note is made of stenosis involving the right upper lobe bronchi and vasculature.    Pleural thickening surrounding the right upper lobe, with a small pleural effusion.    Right inguinal hernia containing a loop of small bowel.    Remote non-healing and healed fractures of multiple anterior ribs bilaterally and the sternum, indicating prior trauma.    Additional findings include:  -Cholelithiasis, without cholecystitis  -Changes of chronic pancreatitis  -Intramedullary vesna and screw within the right femur    RECIST SUMMARY: Date of prior examination for comparison  outside CT 3/8/17    Lesion 1: Right  upper lobe 5.6 cm Series 2 Image 17 Prior measurement 5.8 cm  Lesion 2: Right lower lobe 2.8 cm Series 2 Image 34 Prior measurement 1.7 cm             Vitals:    07/19/17 1450   BP: 96/65   Pulse: 90   Resp: 18   Temp: 98.2 °F (36.8 °C)     Physical Exam   Constitutional: He is oriented to person, place, and time. He appears well-developed.   HENT:   Head: Normocephalic and atraumatic.   Eyes: Pupils are equal, round, and reactive to light.   Neck: Normal range of motion.   Cardiovascular: Normal rate and regular rhythm.    Pulmonary/Chest: Effort normal and breath sounds normal. No respiratory distress.   Abdominal: Soft. He exhibits no distension and no mass. There is no tenderness.   Musculoskeletal: He exhibits no edema.   Lymphadenopathy:     He has no cervical adenopathy.   Neurological: He is alert and oriented to person, place, and time. No cranial nerve deficit.   Skin: Skin is warm.   Psychiatric: He has a normal mood and affect.     Component      Latest Ref Rng & Units 7/19/2017   Sodium      136 - 145 mmol/L 125 (L)   Potassium      3.5 - 5.1 mmol/L 4.3   Chloride      95 - 110 mmol/L 93 (L)   CO2      23 - 29 mmol/L 26   Glucose      70 - 110 mg/dL 94   BUN, Bld      8 - 23 mg/dL 14   Creatinine      0.5 - 1.4 mg/dL 1.2   Calcium      8.7 - 10.5 mg/dL 9.1   Total Protein      6.0 - 8.4 g/dL 7.9   Albumin      3.5 - 5.2 g/dL 3.4 (L)   Total Bilirubin      0.1 - 1.0 mg/dL 0.2   Alkaline Phosphatase      55 - 135 U/L 117   AST      10 - 40 U/L 21   ALT      10 - 44 U/L 19   Anion Gap      8 - 16 mmol/L 6 (L)   eGFR if African American      >60 mL/min/1.73 m:2 >60.0   eGFR if non African American      >60 mL/min/1.73 m:2 >60.0   WBC      3.90 - 12.70 K/uL 4.57   RBC      4.60 - 6.20 M/uL 2.85 (L)   Hemoglobin      14.0 - 18.0 g/dL 8.4 (L)   Hematocrit      40.0 - 54.0 % 24.9 (L)   MCV      82 - 98 fL 87   MCH      27.0 - 31.0 pg 29.5   MCHC      32.0 - 36.0 % 33.7   RDW      11.5 - 14.5 % 14.0    Platelets      150 - 350 K/uL 195   MPV      9.2 - 12.9 fL 8.8 (L)   Gran #      1.8 - 7.7 K/uL 2.4   Magnesium      1.6 - 2.6 mg/dL 2.0       Assessment:     1. Mr. Salazar has completed 2 cycles of chemotherapy ( Carbo + Gemcitabine).  He will need repeat labs prior to day 8 chemotherapy.    He had CT today. While there is a slight increase in the size of the lesion in the right LL compared to CT done in march at [facility], it does not appear to have changed compare dto PET CT from April 2017.He is feeling very weak and does not want chemotherapy this week.The scans will be reviewed at next week's tumor board and if it is determined he has progression, he might be eligible for the  trial.       2. Hyponatremia: he has hypotension, but is asymptomatic.Possible SIADH.    3. Anemia: He has normocytic anemia     4. Cough: Chronic, productive. He has tried expectorants, cough suppressants, mucolytics, Tessalon Jesi. None of them has provided significant relief. He will stop ACE-I.    Plan:    --Tumor board next week  --Stop Lisinopril  --Continue over the counter cough medicine  --Periactin for cachexia

## 2017-07-19 ENCOUNTER — OFFICE VISIT (OUTPATIENT)
Dept: HEMATOLOGY/ONCOLOGY | Facility: CLINIC | Age: 67
End: 2017-07-19
Payer: MEDICARE

## 2017-07-19 ENCOUNTER — HOSPITAL ENCOUNTER (OUTPATIENT)
Dept: RADIOLOGY | Facility: HOSPITAL | Age: 67
Discharge: HOME | End: 2017-07-19
Attending: INTERNAL MEDICINE
Payer: MEDICARE

## 2017-07-19 VITALS
WEIGHT: 123 LBS | SYSTOLIC BLOOD PRESSURE: 96 MMHG | TEMPERATURE: 98 F | HEIGHT: 62 IN | BODY MASS INDEX: 22.63 KG/M2 | HEART RATE: 90 BPM | OXYGEN SATURATION: 97 % | DIASTOLIC BLOOD PRESSURE: 65 MMHG | RESPIRATION RATE: 18 BRPM

## 2017-07-19 DIAGNOSIS — C34.90 RECURRENT NON-SMALL CELL LUNG CANCER: ICD-10-CM

## 2017-07-19 DIAGNOSIS — C34.90 MALIGNANT NEOPLASM OF LUNG, UNSPECIFIED LATERALITY, UNSPECIFIED PART OF LUNG: Primary | ICD-10-CM

## 2017-07-19 DIAGNOSIS — Z09 CHEMOTHERAPY FOLLOW-UP EXAMINATION: ICD-10-CM

## 2017-07-19 DIAGNOSIS — R59.0 MEDIASTINAL LYMPHADENOPATHY: ICD-10-CM

## 2017-07-19 DIAGNOSIS — C34.91 PRIMARY CANCER OF RIGHT LUNG: ICD-10-CM

## 2017-07-19 PROCEDURE — 74177 CT ABD & PELVIS W/CONTRAST: CPT | Mod: 26,,, | Performed by: RADIOLOGY

## 2017-07-19 PROCEDURE — 99999 PR PBB SHADOW E&M-EST. PATIENT-LVL IV: CPT | Mod: PBBFAC,,, | Performed by: INTERNAL MEDICINE

## 2017-07-19 PROCEDURE — 71260 CT THORAX DX C+: CPT | Mod: 26,,, | Performed by: RADIOLOGY

## 2017-07-19 PROCEDURE — 99213 OFFICE O/P EST LOW 20 MIN: CPT | Mod: S$GLB,,, | Performed by: INTERNAL MEDICINE

## 2017-07-19 RX ORDER — MORPHINE SULFATE 15 MG/1
15 TABLET, FILM COATED, EXTENDED RELEASE ORAL 2 TIMES DAILY
Qty: 60 TABLET | Refills: 0 | Status: SHIPPED | OUTPATIENT
Start: 2017-07-19 | End: 2017-10-23

## 2017-07-19 RX ORDER — ALBUTEROL SULFATE 0.63 MG/3ML
SOLUTION RESPIRATORY (INHALATION)
COMMUNITY
Start: 2017-06-15 | End: 2017-08-20 | Stop reason: ALTCHOICE

## 2017-07-19 RX ORDER — CYPROHEPTADINE HYDROCHLORIDE 4 MG/1
4 TABLET ORAL 3 TIMES DAILY
Qty: 90 TABLET | Refills: 2 | Status: SHIPPED | OUTPATIENT
Start: 2017-07-19 | End: 2017-11-30 | Stop reason: SDUPTHER

## 2017-07-19 RX ORDER — OXYCODONE AND ACETAMINOPHEN 5; 325 MG/1; MG/1
1 TABLET ORAL EVERY 4 HOURS PRN
Qty: 90 TABLET | Refills: 0 | Status: SHIPPED | OUTPATIENT
Start: 2017-07-19 | End: 2017-08-28 | Stop reason: SDUPTHER

## 2017-07-19 RX ADMIN — IOHEXOL 75 ML: 350 INJECTION, SOLUTION INTRAVENOUS at 11:07

## 2017-07-21 RX ORDER — HEPARIN 100 UNIT/ML
500 SYRINGE INTRAVENOUS
Status: CANCELLED | OUTPATIENT
Start: 2017-07-21

## 2017-07-21 RX ORDER — SODIUM CHLORIDE 0.9 % (FLUSH) 0.9 %
10 SYRINGE (ML) INJECTION
Status: CANCELLED | OUTPATIENT
Start: 2017-07-21

## 2017-07-21 RX ORDER — HEPARIN 100 UNIT/ML
500 SYRINGE INTRAVENOUS
Status: CANCELLED | OUTPATIENT
Start: 2017-07-28

## 2017-07-21 RX ORDER — SODIUM CHLORIDE 0.9 % (FLUSH) 0.9 %
10 SYRINGE (ML) INJECTION
Status: CANCELLED | OUTPATIENT
Start: 2017-07-28

## 2017-07-26 ENCOUNTER — DOCUMENTATION ONLY (OUTPATIENT)
Dept: CARDIOTHORACIC SURGERY | Facility: CLINIC | Age: 67
End: 2017-07-26

## 2017-07-26 DIAGNOSIS — C34.90 SQUAMOUS CELL LUNG CANCER, UNSPECIFIED LATERALITY: Primary | ICD-10-CM

## 2017-07-27 ENCOUNTER — OFFICE VISIT (OUTPATIENT)
Dept: HEMATOLOGY/ONCOLOGY | Facility: CLINIC | Age: 67
End: 2017-07-27
Payer: MEDICARE

## 2017-07-27 ENCOUNTER — RESEARCH ENCOUNTER (OUTPATIENT)
Dept: RESEARCH | Facility: HOSPITAL | Age: 67
End: 2017-07-27

## 2017-07-27 ENCOUNTER — LAB VISIT (OUTPATIENT)
Dept: LAB | Facility: HOSPITAL | Age: 67
End: 2017-07-27
Attending: INTERNAL MEDICINE
Payer: MEDICARE

## 2017-07-27 VITALS
TEMPERATURE: 98 F | HEART RATE: 101 BPM | BODY MASS INDEX: 22.38 KG/M2 | OXYGEN SATURATION: 100 % | WEIGHT: 122.38 LBS | SYSTOLIC BLOOD PRESSURE: 115 MMHG | RESPIRATION RATE: 18 BRPM | DIASTOLIC BLOOD PRESSURE: 70 MMHG

## 2017-07-27 DIAGNOSIS — Z00.6 EXAMINATION OF PARTICIPANT IN CLINICAL TRIAL: ICD-10-CM

## 2017-07-27 DIAGNOSIS — C34.90 RECURRENT NON-SMALL CELL LUNG CANCER: Primary | ICD-10-CM

## 2017-07-27 DIAGNOSIS — C34.90 MALIGNANT NEOPLASM OF LUNG, UNSPECIFIED LATERALITY, UNSPECIFIED PART OF LUNG: ICD-10-CM

## 2017-07-27 DIAGNOSIS — Z09 CHEMOTHERAPY FOLLOW-UP EXAMINATION: ICD-10-CM

## 2017-07-27 DIAGNOSIS — R59.0 MEDIASTINAL LYMPHADENOPATHY: ICD-10-CM

## 2017-07-27 DIAGNOSIS — C34.90 SQUAMOUS CELL LUNG CANCER, UNSPECIFIED LATERALITY: ICD-10-CM

## 2017-07-27 DIAGNOSIS — R68.89 OTHER GENERAL SYMPTOMS AND SIGNS: ICD-10-CM

## 2017-07-27 LAB
ALBUMIN SERPL BCP-MCNC: 3.4 G/DL
ALP SERPL-CCNC: 99 U/L
ALT SERPL W/O P-5'-P-CCNC: 12 U/L
ANION GAP SERPL CALC-SCNC: 10 MMOL/L
AST SERPL-CCNC: 17 U/L
BASOPHILS # BLD AUTO: 0.02 K/UL
BASOPHILS NFR BLD: 0.4 %
BILIRUB SERPL-MCNC: 0.3 MG/DL
BUN SERPL-MCNC: 12 MG/DL
CALCIUM SERPL-MCNC: 9.4 MG/DL
CHLORIDE SERPL-SCNC: 96 MMOL/L
CO2 SERPL-SCNC: 25 MMOL/L
CREAT SERPL-MCNC: 1.2 MG/DL
DIFFERENTIAL METHOD: ABNORMAL
DRUG STUDY SPECIMEN TYPE: NORMAL
DRUG STUDY TEST NAME: NORMAL
DRUG STUDY TEST RESULT: NORMAL
EOSINOPHIL # BLD AUTO: 0.3 K/UL
EOSINOPHIL NFR BLD: 5.3 %
ERYTHROCYTE [DISTWIDTH] IN BLOOD BY AUTOMATED COUNT: 14.5 %
EST. GFR  (AFRICAN AMERICAN): >60 ML/MIN/1.73 M^2
EST. GFR  (NON AFRICAN AMERICAN): >60 ML/MIN/1.73 M^2
GLUCOSE SERPL-MCNC: 127 MG/DL
HCT VFR BLD AUTO: 25.8 %
HGB BLD-MCNC: 8.7 G/DL
LYMPHOCYTES # BLD AUTO: 1.1 K/UL
LYMPHOCYTES NFR BLD: 19.5 %
MCH RBC QN AUTO: 30.3 PG
MCHC RBC AUTO-ENTMCNC: 33.7 G/DL
MCV RBC AUTO: 90 FL
MONOCYTES # BLD AUTO: 1.5 K/UL
MONOCYTES NFR BLD: 26.1 %
NEUTROPHILS # BLD AUTO: 2.7 K/UL
NEUTROPHILS NFR BLD: 48.2 %
PLATELET # BLD AUTO: 587 K/UL
PMV BLD AUTO: 8.4 FL
POTASSIUM SERPL-SCNC: 4.3 MMOL/L
PROT SERPL-MCNC: 8 G/DL
RBC # BLD AUTO: 2.87 M/UL
SODIUM SERPL-SCNC: 131 MMOL/L
WBC # BLD AUTO: 5.68 K/UL

## 2017-07-27 PROCEDURE — 99215 OFFICE O/P EST HI 40 MIN: CPT | Mod: S$GLB,,, | Performed by: INTERNAL MEDICINE

## 2017-07-27 PROCEDURE — 85025 COMPLETE CBC W/AUTO DIFF WBC: CPT

## 2017-07-27 PROCEDURE — 80053 COMPREHEN METABOLIC PANEL: CPT

## 2017-07-27 PROCEDURE — 99999 PR PBB SHADOW E&M-EST. PATIENT-LVL IV: CPT | Mod: PBBFAC,,, | Performed by: INTERNAL MEDICINE

## 2017-07-27 PROCEDURE — 99000 SPECIMEN HANDLING OFFICE-LAB: CPT

## 2017-07-27 PROCEDURE — 36415 COLL VENOUS BLD VENIPUNCTURE: CPT

## 2017-07-27 NOTE — Clinical Note
Please check with research regarding scheduling.  He is planning on going on trial.  discontinue chemo tomorrow

## 2017-07-27 NOTE — PROGRESS NOTES
PATIENT: Karsten Salazar  MRN: [id number]  DATE: 7/27/2017      Diagnosis:   1. Recurrent non-small cell lung cancer    2. Mediastinal lymphadenopathy    3. Chemotherapy follow-up examination        Chief Complaint: Lung Cancer      Oncologic History:      Oncologic History Non-small cell lung cancer diagnosed 6/2016   Recurrent disease to lung 5/2017    Oncologic Treatment Concurrent chemoXRT with weekly carboplatin and paclitaxel, XRT to a dose of 5840 cGy 10/31/2016  Carboplatin/gemcitabine 6/2/2017 - 7/2017 (discontinued due to progression)    Pathology Squamous cell carcinoma, T3, N2, M0 (6/2016), PD-L1 TPS 1%        Subjective:    Interval History: Mr. Salazar is a 67 y.o. male who is seen in follow-up for lung cancer.  Since I had seen him last he is completed 2 cycles of chemotherapy.  Review of his CT in thoracic tumor Board yesterday indicated progressive disease.  He states he still has some ongoing shortness of breath, however, this has improved.  His right-sided chest pain is unchanged as is his cough.  He is still up and ambulatory majority of the day and without other complaints.    He has previously seen Dr. Ubaldo Thomason at [facility].  His history dates to 6/2016 when he sought medical attention for shortness of breath and cough.  He was found to have a large lung mass in the right upper lobe with mediastinal lymphadenopathy.  He underwent subsequent workup, however, symptoms worsened and progressed to him having a cardiac arrest requiring CPR and prolonged ICU stay.  Eventually a biopsy was performed showing squamous cell lung cancer.  This was initially staged as a T3, N2.  He was treated with concurrent chemotherapy and radiation until 10/31/16.  He is an on surveillance since this time.  His insurance changed and is now following and [facility].  Repeat PET/CT had shown locally recurrent disease.  MRI of the brain was negative.  He was started on carboplatin and gemcitabine on 6/2/17.  CT on 7/19/17 and  had indicated progressive disease.      Past Medical History:   Past Medical History:   Diagnosis Date    History of cardiac arrest     Hyperlipidemia     Hypertension     Mediastinal lymphadenopathy 5/24/2017    Primary cancer of right lung 4/6/2017    Recurrent non-small cell lung cancer 5/24/2017       Past Surgical HIstory:   Past Surgical History:   Procedure Laterality Date    HERNIA REPAIR         Family History:   Family History   Problem Relation Age of Onset    Cancer Mother        Social History:  reports that he has quit smoking. His smoking use included Cigarettes. He has a 72.00 pack-year smoking history. He does not have any smokeless tobacco history on file. He reports that he drinks about 10.2 oz of alcohol per week . He reports that he does not use drugs.    Allergies:  Review of patient's allergies indicates:  No Known Allergies    Medications:  Current Outpatient Prescriptions   Medication Sig Dispense Refill    albuterol (ACCUNEB) 0.63 mg/3 mL Nebu       albuterol 90 mcg/actuation inhaler Inhale 2 puffs into the lungs.      albuterol-ipratropium 2.5mg-0.5mg/3mL (DUO-NEB) 0.5 mg-3 mg(2.5 mg base)/3 mL nebulizer solution Inhale 3 mLs into the lungs.      aspirin (ECOTRIN) 81 MG EC tablet Take 81 mg by mouth.      atorvastatin (LIPITOR) 40 MG tablet Take 40 mg by mouth.      benzonatate (TESSALON) 100 MG capsule Take 100 mg by mouth.      carvedilol (COREG) 3.125 MG tablet TAKE 1 TABLET BY MOUTH TWICE DAILY 180 tablet 0    carvedilol (COREG) 3.125 MG tablet TAKE 1 TABLET BY MOUTH TWICE DAILY 60 tablet 0    cyproheptadine (PERIACTIN) 4 mg tablet Take 1 tablet (4 mg total) by mouth 3 (three) times daily. 90 tablet 2    guaifenesin-codeine 100-10 mg/5 ml (TUSSI-ORGANIDIN NR)  mg/5 mL syrup Take 5 mLs by mouth 3 (three) times daily as needed for Cough.      lisinopril (PRINIVIL,ZESTRIL) 2.5 MG tablet Take 2.5 mg by mouth.      morphine (MS CONTIN) 15 MG 12 hr tablet Take 1  tablet (15 mg total) by mouth 2 (two) times daily. 60 tablet 0    ondansetron (ZOFRAN, AS HYDROCHLORIDE,) 4 MG tablet Take 1 tablet (4 mg total) by mouth every 8 (eight) hours as needed for Nausea. 60 tablet 1    oxycodone-acetaminophen (PERCOCET) 5-325 mg per tablet Take 1 tablet by mouth every 4 (four) hours as needed for Pain. 90 tablet 0    tiotropium (SPIRIVA) 18 mcg inhalation capsule Inhale 1 capsule (18 mcg total) into the lungs once daily. 30 capsule 1     No current facility-administered medications for this visit.        Review of Systems   Constitutional: Negative for appetite change, chills, fatigue, fever and unexpected weight change.   HENT: Negative for dental problem, sinus pressure and sneezing.    Eyes: Negative for visual disturbance.   Respiratory: Positive for cough and shortness of breath. Negative for choking and chest tightness.    Cardiovascular: Positive for chest pain. Negative for leg swelling.   Gastrointestinal: Negative for abdominal pain, blood in stool, constipation, diarrhea and nausea.   Genitourinary: Negative for difficulty urinating and dysuria.   Musculoskeletal: Negative for arthralgias and back pain.   Skin: Negative for rash and wound.   Neurological: Negative for dizziness, light-headedness and headaches.   Hematological: Negative for adenopathy. Does not bruise/bleed easily.   Psychiatric/Behavioral: Negative for sleep disturbance. The patient is not nervous/anxious.        ECOG Performance Status:   ECOG SCORE    1 - Restricted in strenuous activity-ambulatory and able to carry out work of a light nature         Objective:      Vitals:   Vitals:    07/27/17 0821   BP: 115/70   BP Location: Right arm   Patient Position: Sitting   BP Method: Automatic   Pulse: 101   Resp: 18   Temp: 98.2 °F (36.8 °C)   TempSrc: Oral   SpO2: 100%   Weight: 55.5 kg (122 lb 5.7 oz)     BMI: Body mass index is 22.38 kg/m².    Physical Exam   Constitutional: He is oriented to person, place,  and time. He appears well-developed and well-nourished.   HENT:   Head: Normocephalic and atraumatic.   Eyes: Pupils are equal, round, and reactive to light.   Neck: Normal range of motion. Neck supple.   Cardiovascular: Normal rate and regular rhythm.    Pulmonary/Chest: Effort normal and breath sounds normal. No respiratory distress.   Abdominal: Soft. He exhibits no distension. There is no tenderness.   Musculoskeletal: He exhibits no edema or tenderness.   Lymphadenopathy:     He has no cervical adenopathy.   Neurological: He is alert and oriented to person, place, and time. No cranial nerve deficit.   Skin: Skin is warm and dry.   Psychiatric: He has a normal mood and affect. His behavior is normal.       Laboratory Data:  Lab Visit on 07/27/2017   Component Date Value Ref Range Status    WBC 07/27/2017 5.68  3.90 - 12.70 K/uL Final    RBC 07/27/2017 2.87* 4.60 - 6.20 M/uL Final    Hemoglobin 07/27/2017 8.7* 14.0 - 18.0 g/dL Final    Hematocrit 07/27/2017 25.8* 40.0 - 54.0 % Final    MCV 07/27/2017 90  82 - 98 fL Final    MCH 07/27/2017 30.3  27.0 - 31.0 pg Final    MCHC 07/27/2017 33.7  32.0 - 36.0 g/dL Final    RDW 07/27/2017 14.5  11.5 - 14.5 % Final    Platelets 07/27/2017 587* 150 - 350 K/uL Final    MPV 07/27/2017 8.4* 9.2 - 12.9 fL Final    Gran # 07/27/2017 2.7  1.8 - 7.7 K/uL Final    Lymph # 07/27/2017 1.1  1.0 - 4.8 K/uL Final    Mono # 07/27/2017 1.5* 0.3 - 1.0 K/uL Final    Eos # 07/27/2017 0.3  0.0 - 0.5 K/uL Final    Baso # 07/27/2017 0.02  0.00 - 0.20 K/uL Final    Gran% 07/27/2017 48.2  38.0 - 73.0 % Final    Lymph% 07/27/2017 19.5  18.0 - 48.0 % Final    Mono% 07/27/2017 26.1* 4.0 - 15.0 % Final    Eosinophil% 07/27/2017 5.3  0.0 - 8.0 % Final    Basophil% 07/27/2017 0.4  0.0 - 1.9 % Final    Differential Method 07/27/2017 Automated   Final    Sodium 07/27/2017 131* 136 - 145 mmol/L Final    Potassium 07/27/2017 4.3  3.5 - 5.1 mmol/L Final    Chloride 07/27/2017 96  95  - 110 mmol/L Final    CO2 07/27/2017 25  23 - 29 mmol/L Final    Glucose 07/27/2017 127* 70 - 110 mg/dL Final    BUN, Bld 07/27/2017 12  8 - 23 mg/dL Final    Creatinine 07/27/2017 1.2  0.5 - 1.4 mg/dL Final    Calcium 07/27/2017 9.4  8.7 - 10.5 mg/dL Final    Total Protein 07/27/2017 8.0  6.0 - 8.4 g/dL Final    Albumin 07/27/2017 3.4* 3.5 - 5.2 g/dL Final    Total Bilirubin 07/27/2017 0.3  0.1 - 1.0 mg/dL Final    Comment: For infants and newborns, interpretation of results should be based  on gestational age, weight and in agreement with clinical  observations.  Premature Infant recommended reference ranges:  Up to 24 hours.............<8.0 mg/dL  Up to 48 hours............<12.0 mg/dL  3-5 days..................<15.0 mg/dL  6-29 days.................<15.0 mg/dL      Alkaline Phosphatase 07/27/2017 99  55 - 135 U/L Final    AST 07/27/2017 17  10 - 40 U/L Final    ALT 07/27/2017 12  10 - 44 U/L Final    Anion Gap 07/27/2017 10  8 - 16 mmol/L Final    eGFR if African American 07/27/2017 >60.0  >60 mL/min/1.73 m^2 Final    eGFR if non African American 07/27/2017 >60.0  >60 mL/min/1.73 m^2 Final    Comment: Calculation used to obtain the estimated glomerular filtration  rate (eGFR) is the CKD-EPI equation. Since race is unknown   in our information system, the eGFR values for   -American and Non--American patients are given   for each creatinine result.      Drug Study Test Name 07/27/2017 Drug Study   Final    Drug Study Specimen Type 07/27/2017 BLOOD   Final    Drug Study Test Result 07/27/2017 Result sent directly to physician   Final         Imaging:    CT 7/19/17  CT CHEST, ABDOMEN, and, PELVIS  with IV contrast    Protocol:  Axial images of the chest, abdomen, and pelvis were acquired  after the use of 75 cc [id number] IV contrast.  Coronal and sagittal reconstructions were also obtained    HISTORY:  67 year old M with malignant neoplasm of right bronchus.    COMPARISON: CT outside  [facility] 03/08/2017 and 11/29/2016     FINDINGS:     Aorta: Normal in course and caliber, without significant atherosclerotic plaque. There are three branching vessels at the arch.       Heart: Normal in size.  Small pericardial effusion. There is calcification of the aortic valve.  There is calcific coronary atherosclerosis.    Anna Marie/Mediastinum: No significant lymphadenopathy     Lungs: There is evidence of a centrally necrotic mass within the right upper lobe, measuring 5.6 cm in its greatest diameter (axial series 2 image 17), compared to 5.8 cm on most recent prior imaging.  There is circumferential pleural thickening surrounding the right upper lobe, with evidence of a small volume of right-sided pleural fluid.  There is surrounding consolidation and groundglass opacities within the right upper lobe, middle lobe and right lower lobe, possibly reflecting changes of radiation therapy.  There is narrowing of the right upper lobe bronchus and vessels.    There has been interval enlargement of mass within the medial basal segment of the right lower lobe, measuring 2.8 cm (axial series 2 image 34), compared to 1.7 cm previously.  This mass has a decreased attenuation centrally, possibly reflecting presence of necrosis.        There is stable   fibrocalcific scar within the left upper lobe anteriorly.  There are no new focal masses, consolidations or nodules.  There are emphysematous changes of the lungs bilaterally.  There are left-sided apical blebs.     Liver: Normal in size and attenuation, with no focal hepatic lesions. There is interposition in of the colon along the posterior-lateral aspect of the liver.      Gallbladder: There are multiple gallstones, within a nondistended gallbladder.  No evidence of cholecystitis.     Bile Ducts: No evidence of dilated ducts.     Pancreas: There are multiple dystrophic calcifications within the head and uncinate process of the pancreas, suggestive of chronic pancreatitis.       Spleen: Unremarkable.     Adrenals: Unremarkable.     Kidneys/ Ureters: Normal in size and location. Normal concentration and excretion of contrast. No hydronephrosis or nephrolithiasis. No ureteral dilatation.     Bladder: No evidence of wall thickening.     Reproductive organs: Unremarkable.     GI Tract/Mesentery: No evidence of bowel obstruction or inflammation.     Peritoneal Space: There is a small volume of ascites within the pelvis. No free air.     Retroperitoneum:  No significant adenopathy.      Abdominal wall:  There is a small right sided inguinal hernia, containing a loop of small bowel.  No inflammatory changes of the bowel within this territory.      Vasculature: Significant calcific atherosclerosis of the aorta, without aneurysmal dilatation.     Bones: There are multiple anterior rib fractures, 2 of which appear chronic and nonhealed bilaterally.  There is also a nonhealed sternal fracture, which is minimally displaced.  These findings may be secondary to prior trauma or chest compressions.  Surgical changes from placement of intramedullary vesna and screw within the right femur, for a fracture Age-appropriate degenerative changes are noted.   Impression        2 right lung masses, consistent with malignancy.  The right upper lobe mass appears stable in size, and there has been interval growth of the right lower lobe mass.    Consolidation and groundglass opacities surrounding the right upper lobe mass, possibly reflecting changes of radiation therapy.  Note is made of stenosis involving the right upper lobe bronchi and vasculature.    Pleural thickening surrounding the right upper lobe, with a small pleural effusion.    Right inguinal hernia containing a loop of small bowel.    Remote non-healing and healed fractures of multiple anterior ribs bilaterally and the sternum, indicating prior trauma.    Additional findings include:  -Cholelithiasis, without cholecystitis  -Changes of chronic  pancreatitis  -Intramedullary vesna and screw within the right femur    RECIST SUMMARY: Date of prior examination for comparison  outside CT 3/8/17    Lesion 1: Right upper lobe 5.6 cm Series 2 Image 17 Prior measurement 5.8 cm  Lesion 2: Right lower lobe 2.8 cm Series 2 Image 34 Prior measurement 1.7 cm                   Assessment:       1. Recurrent non-small cell lung cancer    2. Mediastinal lymphadenopathy    3. Chemotherapy follow-up examination           Plan:     Mr. Salazar was discussed in our multidisciplinary dressing tumor board yesterday and his scans were reviewed indicating progression of his disease.  His symptoms are relatively unchanged and his performance status appears to be ECOG PS 1.  He does desire additional treatment.  I have recommended moving forward with immunotherapy and have given him options of standard of care treatment versus a clinical trial.  The clinical trial he would qualify for would be the lung MAP trial with nivolumab plus or minus ipilumumab.  I discussed the fact that being in a research protocol history fluid voluntary and that he may withdraw consent at any time.  I have discussed the 2 medications and described the rationale, alternatives and potential side effects of these treatments with him.  He is agreeable to proceed with this protocol and will meet with our research team today to initiate screening.  I will plan to see him back per protocol.  If screening fails, or if he decides he does not want to go on a research protocol then I would proceed with treatment with pembrolizumab.    Gomez Garsia DO, FACP  Hematology & Oncology  [facility] [place] [address]  ph. [phone]  Fax. [phone]    40 minutes were spent in coordination of patient's care, record review and counseling.  More than 50% of the time was face-to-face.

## 2017-07-28 ENCOUNTER — TELEPHONE (OUTPATIENT)
Dept: RESEARCH | Facility: HOSPITAL | Age: 67
End: 2017-07-28

## 2017-07-28 NOTE — TELEPHONE ENCOUNTER
Spoke with patient's wife Augusta regarding scheduled appointments for clinical trial screening. Patient is ok with all of testing being done on the same day. Disussed tests to be performed and scheduled times. She verbalized understanding. Appointment reminders mailed out.

## 2017-08-02 ENCOUNTER — HOSPITAL ENCOUNTER (OUTPATIENT)
Dept: CARDIOLOGY | Facility: CLINIC | Age: 67
Discharge: HOME | End: 2017-08-02
Payer: MEDICARE

## 2017-08-02 ENCOUNTER — HOSPITAL ENCOUNTER (OUTPATIENT)
Dept: RADIOLOGY | Facility: HOSPITAL | Age: 67
Discharge: HOME | End: 2017-08-02
Attending: INTERNAL MEDICINE
Payer: MEDICARE

## 2017-08-02 ENCOUNTER — RESEARCH ENCOUNTER (OUTPATIENT)
Dept: RESEARCH | Facility: HOSPITAL | Age: 67
End: 2017-08-02

## 2017-08-02 DIAGNOSIS — C34.90 RECURRENT NON-SMALL CELL LUNG CANCER: ICD-10-CM

## 2017-08-02 DIAGNOSIS — Z00.6 EXAMINATION OF PARTICIPANT IN CLINICAL TRIAL: ICD-10-CM

## 2017-08-02 LAB
DIASTOLIC DYSFUNCTION: YES
ESTIMATED PA SYSTOLIC PRESSURE: 42.77
GLOBAL PERICARDIAL EFFUSION: ABNORMAL
MITRAL VALVE REGURGITATION: ABNORMAL
RETIRED EF AND QEF - SEE NOTES: 60 (ref 55–65)
TRICUSPID VALVE REGURGITATION: ABNORMAL

## 2017-08-02 PROCEDURE — 93000 ELECTROCARDIOGRAM COMPLETE: CPT | Mod: S$GLB,,, | Performed by: INTERNAL MEDICINE

## 2017-08-02 PROCEDURE — 93306 TTE W/DOPPLER COMPLETE: CPT | Mod: Q1,S$GLB,, | Performed by: INTERNAL MEDICINE

## 2017-08-02 PROCEDURE — 70553 MRI BRAIN STEM W/O & W/DYE: CPT | Mod: 26,Q1,, | Performed by: RADIOLOGY

## 2017-08-02 RX ORDER — GADOBUTROL 604.72 MG/ML
6 INJECTION INTRAVENOUS
Status: COMPLETED | OUTPATIENT
Start: 2017-08-02 | End: 2017-08-02

## 2017-08-02 RX ADMIN — GADOBUTROL 6 ML: 604.72 INJECTION INTRAVENOUS at 09:08

## 2017-08-07 ENCOUNTER — RESEARCH ENCOUNTER (OUTPATIENT)
Dept: RESEARCH | Facility: HOSPITAL | Age: 67
End: 2017-08-07

## 2017-08-07 NOTE — PROGRESS NOTES
"Protocol:  Lung MAP  Sub Investigator: Gomez Garsia DO  Treating Physician: Gomez Garsia DO  Pt Initials: AA  Patient ID: [id number]  IRB#: [id number]    August 2, 2017    Met with patient and wife regarding completion of PRO and pre-study EQ-5D questionnaires. Patient completed questionnaires. He states that his is not eating much and is tired a lot. He lays in bed most of the day watching TV. He states that he has been feeling down lately. I encourage patient to ventilate feelings. He does not have any SI/HI. He states, " It's not that bad. I  still enjoy laughing and having a good time with family and friends. " He thinks he may benefit from speaking to someone regarding his feelings. The wife would like more information regarding dietary supplements. I spoke with Dr. Garsia regarding patient's responses to questions and concerns. He will see patient to follow-up on performance status and address concerns prior to registering patient. He is in the process of completing all screening procedures.   "

## 2017-08-09 ENCOUNTER — OFFICE VISIT (OUTPATIENT)
Dept: HEMATOLOGY/ONCOLOGY | Facility: CLINIC | Age: 67
End: 2017-08-09
Payer: MEDICARE

## 2017-08-09 ENCOUNTER — RESEARCH ENCOUNTER (OUTPATIENT)
Dept: RESEARCH | Facility: HOSPITAL | Age: 67
End: 2017-08-09

## 2017-08-09 VITALS
WEIGHT: 119.06 LBS | HEART RATE: 103 BPM | SYSTOLIC BLOOD PRESSURE: 122 MMHG | DIASTOLIC BLOOD PRESSURE: 75 MMHG | BODY MASS INDEX: 21.77 KG/M2 | TEMPERATURE: 99 F | RESPIRATION RATE: 18 BRPM | OXYGEN SATURATION: 99 %

## 2017-08-09 DIAGNOSIS — Z00.6 EXAM FOR CLINICAL TRIAL: ICD-10-CM

## 2017-08-09 DIAGNOSIS — C34.90 RECURRENT NON-SMALL CELL LUNG CANCER: Primary | ICD-10-CM

## 2017-08-09 PROCEDURE — 99214 OFFICE O/P EST MOD 30 MIN: CPT | Mod: S$PBB,,, | Performed by: INTERNAL MEDICINE

## 2017-08-09 PROCEDURE — 99213 OFFICE O/P EST LOW 20 MIN: CPT | Mod: PBBFAC | Performed by: INTERNAL MEDICINE

## 2017-08-09 PROCEDURE — 3008F BODY MASS INDEX DOCD: CPT | Mod: CPTII,S$GLB,, | Performed by: INTERNAL MEDICINE

## 2017-08-09 PROCEDURE — 99999 PR PBB SHADOW E&M-EST. PATIENT-LVL III: CPT | Mod: PBBFAC,,, | Performed by: INTERNAL MEDICINE

## 2017-08-09 NOTE — Clinical Note
He is going on a clinical trial and plans to start treatment on Monday Follow back up with me per protocol

## 2017-08-09 NOTE — PROGRESS NOTES
PATIENT: Karsten Salazar  MRN: [id number]  DATE: 8/9/2017      Diagnosis:   1. Recurrent non-small cell lung cancer    2. Exam for clinical trial        Chief Complaint: Recurrent non-small cell lung cancer      Oncologic History:      Oncologic History Non-small cell lung cancer diagnosed 6/2016   Recurrent disease to lung 5/2017    Oncologic Treatment Concurrent chemoXRT with weekly carboplatin and paclitaxel, XRT to a dose of 5840 cGy 10/31/2016  Carboplatin/gemcitabine 6/2/2017 - 7/2017 (discontinued due to progression)    Pathology Squamous cell carcinoma, T3, N2, M0 (6/2016), PD-L1 TPS 1%        Subjective:    Interval History: Mr. Salazar is a 67 y.o. male who is seen in follow-up for lung cancer.  He states he still has some ongoing cough and shortness of breath.  His chest wall pain has improved.  He is without other new complaints.      He has previously seen Dr. Ubaldo Thomason at [facility].  His history dates to 6/2016 when he sought medical attention for shortness of breath and cough.  He was found to have a large lung mass in the right upper lobe with mediastinal lymphadenopathy.  He underwent subsequent workup, however, symptoms worsened and progressed to him having a cardiac arrest requiring CPR and prolonged ICU stay.  Eventually a biopsy was performed showing squamous cell lung cancer.  This was initially staged as a T3, N2.  He was treated with concurrent chemotherapy and radiation until 10/31/16.  He is an on surveillance since this time.  His insurance changed and is now following and [facility].  Repeat PET/CT had shown locally recurrent disease.  MRI of the brain was negative.  He was started on carboplatin and gemcitabine on 6/2/17.  CT on 7/19/17 and had indicated progressive disease.      Past Medical History:   Past Medical History:   Diagnosis Date    Exam for clinical trial 8/9/2017    History of cardiac arrest     Hyperlipidemia     Hypertension     Mediastinal lymphadenopathy 5/24/2017     Primary cancer of right lung 4/6/2017    Recurrent non-small cell lung cancer 5/24/2017       Past Surgical HIstory:   Past Surgical History:   Procedure Laterality Date    HERNIA REPAIR         Family History:   Family History   Problem Relation Age of Onset    Cancer Mother        Social History:  reports that he has quit smoking. His smoking use included Cigarettes. He has a 72.00 pack-year smoking history. He does not have any smokeless tobacco history on file. He reports that he drinks about 10.2 oz of alcohol per week . He reports that he does not use drugs.    Allergies:  Review of patient's allergies indicates:  No Known Allergies    Medications:  Current Outpatient Prescriptions   Medication Sig Dispense Refill    albuterol (ACCUNEB) 0.63 mg/3 mL Nebu       albuterol 90 mcg/actuation inhaler Inhale 2 puffs into the lungs.      albuterol-ipratropium 2.5mg-0.5mg/3mL (DUO-NEB) 0.5 mg-3 mg(2.5 mg base)/3 mL nebulizer solution Inhale 3 mLs into the lungs.      aspirin (ECOTRIN) 81 MG EC tablet Take 81 mg by mouth.      atorvastatin (LIPITOR) 40 MG tablet Take 40 mg by mouth.      benzonatate (TESSALON) 100 MG capsule Take 100 mg by mouth.      carvedilol (COREG) 3.125 MG tablet TAKE 1 TABLET BY MOUTH TWICE DAILY 180 tablet 0    carvedilol (COREG) 3.125 MG tablet TAKE 1 TABLET BY MOUTH TWICE DAILY 60 tablet 0    cyproheptadine (PERIACTIN) 4 mg tablet Take 1 tablet (4 mg total) by mouth 3 (three) times daily. 90 tablet 2    guaifenesin-codeine 100-10 mg/5 ml (TUSSI-ORGANIDIN NR)  mg/5 mL syrup Take 5 mLs by mouth 3 (three) times daily as needed for Cough.      lisinopril (PRINIVIL,ZESTRIL) 2.5 MG tablet Take 2.5 mg by mouth.      morphine (MS CONTIN) 15 MG 12 hr tablet Take 1 tablet (15 mg total) by mouth 2 (two) times daily. 60 tablet 0    ondansetron (ZOFRAN, AS HYDROCHLORIDE,) 4 MG tablet Take 1 tablet (4 mg total) by mouth every 8 (eight) hours as needed for Nausea. 60 tablet 1     oxycodone-acetaminophen (PERCOCET) 5-325 mg per tablet Take 1 tablet by mouth every 4 (four) hours as needed for Pain. 90 tablet 0    tiotropium (SPIRIVA) 18 mcg inhalation capsule Inhale 1 capsule (18 mcg total) into the lungs once daily. 30 capsule 1     No current facility-administered medications for this visit.        Review of Systems   Constitutional: Negative for appetite change, chills, fatigue, fever and unexpected weight change.   HENT: Negative for dental problem, sinus pressure and sneezing.    Eyes: Negative for visual disturbance.   Respiratory: Positive for cough and shortness of breath. Negative for choking and chest tightness.    Cardiovascular: Positive for chest pain. Negative for leg swelling.   Gastrointestinal: Negative for abdominal pain, blood in stool, constipation, diarrhea and nausea.   Genitourinary: Negative for difficulty urinating and dysuria.   Musculoskeletal: Negative for arthralgias and back pain.   Skin: Negative for rash and wound.   Neurological: Negative for dizziness, light-headedness and headaches.   Hematological: Negative for adenopathy. Does not bruise/bleed easily.   Psychiatric/Behavioral: Negative for sleep disturbance. The patient is not nervous/anxious.        ECOG Performance Status:   ECOG SCORE           Objective:      Vitals:   Vitals:    08/09/17 1103   BP: 122/75   BP Location: Right arm   Patient Position: Sitting   BP Method: Automatic   Pulse: 103   Resp: 18   Temp: 98.7 °F (37.1 °C)   TempSrc: Oral   SpO2: 99%   Weight: 54 kg (119 lb 0.8 oz)     BMI: Body mass index is 21.77 kg/m².    Physical Exam   Constitutional: He is oriented to person, place, and time. He appears well-developed and well-nourished.   HENT:   Head: Normocephalic and atraumatic.   Eyes: Pupils are equal, round, and reactive to light.   Neck: Normal range of motion. Neck supple.   Cardiovascular: Normal rate and regular rhythm.    Pulmonary/Chest: Effort normal and breath sounds normal.  No respiratory distress.   Abdominal: Soft. He exhibits no distension. There is no tenderness.   Musculoskeletal: He exhibits no edema or tenderness.   Lymphadenopathy:     He has no cervical adenopathy.   Neurological: He is alert and oriented to person, place, and time. No cranial nerve deficit.   Skin: Skin is warm and dry.   Psychiatric: He has a normal mood and affect. His behavior is normal.       Laboratory Data:  No visits with results within 1 Week(s) from this visit.   Latest known visit with results is:   Hospital Outpatient Visit on 08/02/2017   Component Date Value Ref Range Status    EF 08/02/2017 60  55 - 65 Final    Mitral Valve Regurgitation 08/02/2017 TRIVIAL   Final    Diastolic Dysfunction 08/02/2017 Yes*  Final    Est. PA Systolic Pressure 08/02/2017 42.77*  Final    Pericardial Effusion 08/02/2017 SMALL*  Final    Tricuspid Valve Regurgitation 08/02/2017 TRIVIAL TO MILD   Final         Imaging:    CT 7/19/17  CT CHEST, ABDOMEN, and, PELVIS  with IV contrast    Protocol:  Axial images of the chest, abdomen, and pelvis were acquired  after the use of 75 cc Itro237 IV contrast.  Coronal and sagittal reconstructions were also obtained    HISTORY:  67 year old M with malignant neoplasm of right bronchus.    COMPARISON: CT outside hospital 03/08/2017 and 11/29/2016     FINDINGS:     Aorta: Normal in course and caliber, without significant atherosclerotic plaque. There are three branching vessels at the arch.       Heart: Normal in size.  Small pericardial effusion. There is calcification of the aortic valve.  There is calcific coronary atherosclerosis.    Anna Marie/Mediastinum: No significant lymphadenopathy     Lungs: There is evidence of a centrally necrotic mass within the right upper lobe, measuring 5.6 cm in its greatest diameter (axial series 2 image 17), compared to 5.8 cm on most recent prior imaging.  There is circumferential pleural thickening surrounding the right upper lobe, with  evidence of a small volume of right-sided pleural fluid.  There is surrounding consolidation and groundglass opacities within the right upper lobe, middle lobe and right lower lobe, possibly reflecting changes of radiation therapy.  There is narrowing of the right upper lobe bronchus and vessels.    There has been interval enlargement of mass within the medial basal segment of the right lower lobe, measuring 2.8 cm (axial series 2 image 34), compared to 1.7 cm previously.  This mass has a decreased attenuation centrally, possibly reflecting presence of necrosis.        There is stable   fibrocalcific scar within the left upper lobe anteriorly.  There are no new focal masses, consolidations or nodules.  There are emphysematous changes of the lungs bilaterally.  There are left-sided apical blebs.     Liver: Normal in size and attenuation, with no focal hepatic lesions. There is interposition in of the colon along the posterior-lateral aspect of the liver.      Gallbladder: There are multiple gallstones, within a nondistended gallbladder.  No evidence of cholecystitis.     Bile Ducts: No evidence of dilated ducts.     Pancreas: There are multiple dystrophic calcifications within the head and uncinate process of the pancreas, suggestive of chronic pancreatitis.      Spleen: Unremarkable.     Adrenals: Unremarkable.     Kidneys/ Ureters: Normal in size and location. Normal concentration and excretion of contrast. No hydronephrosis or nephrolithiasis. No ureteral dilatation.     Bladder: No evidence of wall thickening.     Reproductive organs: Unremarkable.     GI Tract/Mesentery: No evidence of bowel obstruction or inflammation.     Peritoneal Space: There is a small volume of ascites within the pelvis. No free air.     Retroperitoneum:  No significant adenopathy.      Abdominal wall:  There is a small right sided inguinal hernia, containing a loop of small bowel.  No inflammatory changes of the bowel within this  territory.      Vasculature: Significant calcific atherosclerosis of the aorta, without aneurysmal dilatation.     Bones: There are multiple anterior rib fractures, 2 of which appear chronic and nonhealed bilaterally.  There is also a nonhealed sternal fracture, which is minimally displaced.  These findings may be secondary to prior trauma or chest compressions.  Surgical changes from placement of intramedullary vesna and screw within the right femur, for a fracture Age-appropriate degenerative changes are noted.   Impression        2 right lung masses, consistent with malignancy.  The right upper lobe mass appears stable in size, and there has been interval growth of the right lower lobe mass.    Consolidation and groundglass opacities surrounding the right upper lobe mass, possibly reflecting changes of radiation therapy.  Note is made of stenosis involving the right upper lobe bronchi and vasculature.    Pleural thickening surrounding the right upper lobe, with a small pleural effusion.    Right inguinal hernia containing a loop of small bowel.    Remote non-healing and healed fractures of multiple anterior ribs bilaterally and the sternum, indicating prior trauma.    Additional findings include:  -Cholelithiasis, without cholecystitis  -Changes of chronic pancreatitis  -Intramedullary vesna and screw within the right femur    RECIST SUMMARY: Date of prior examination for comparison  outside CT 3/8/17    Lesion 1: Right upper lobe 5.6 cm Series 2 Image 17 Prior measurement 5.8 cm  Lesion 2: Right lower lobe 2.8 cm Series 2 Image 34 Prior measurement 1.7 cm                   Assessment:       1. Recurrent non-small cell lung cancer    2. Exam for clinical trial           Plan:     Mr. Salazar does have preserved performance status, currently ECOG PS 1, and does desire to proceed on with treatment with a clinical trial.  He will be randomized to either nivolumab with or without ipilimumab.  He has met with our research  nurse and will be randomized this week.  Plan to start treatment next week.  Follow back up with me per protocol.  All questions were answered and he is agreeable with this plan.    Gomez Garsia DO, FACP  Hematology & Oncology  [facility] [place] [address]  ph. [phone]  Fax. [phone]    25 minutes were spent in coordination of patient's care, record review and counseling.  More than 50% of the time was face-to-face.

## 2017-08-10 NOTE — PROGRESS NOTES
Protocol:  Lung MAP  Sub study:  I  Sub Investigator: Gomez Garsia DO  Treating Physician: Gomez Garsia DO  Pt Initials: AA  Patient ID: [id number]  IRB#: [id number]    August 9, 2017    Patient was seen in clinic this morning by Dr. Garsia to reassess his performance status and discuss screening test results. He is an ECOG of 1 per Dr. Garsia. Patient is eligible and willing to participate in study. He was randomized to Nivolumab and Ipilumumab. The plan is to start treatment on Monday 8/14/2017. Per Dr. Garsia patient does not need to be seen prior and no new labs required.     Eligibility Note    Patient was assigned to [id number]. Email received on 7/27/2017    Patient has not had prior treatment with an anti-PD-1, anti-PDL1, anti-PD-L2, anti-CTLA-4 antibody, or any other antibody or drug specifically targeting T-cell costimulation or immune checkpoint pathways per chemo flow sheet    Patient has no known, or suspected autoimmune disease per medical history.     Patient has no known allergies     Patient has not received systemic treatment with corticosteroids or other immunosuppressive medications within 14 days prior to sub-study registration. See current med report.     Patient not tested for hepatitis B virus surface antigen (HBV sAg) or hepatitis C virus ribonucleic acid (HCV antibody).     Patient has not been tested for human immunodeficiency virus (HIV) or acquired immunodeficiency syndrome (AIDS).     Patient does not have interstitial lung disease that is symptomatic or disease that may interfere with the detection or management of suspected drug-related pulmonary toxicity per Dr. Garsia    Patient was offered participation in banking for future use of specimens and declined this option per [facility] on 7/27/2017    Patient had a Lipase, Amylase, TSH with reflex Free T3/T4 performed within 7 days prior to sub-study registration. Performed on 8/2/2017 and registered on 8/9/2017    Patients does  not have any Grade III/IV cardiac disease as defined by the New York Heart Association Criteria. Baseline EKG, CPK, troponin, and echocardiogram done secondary to previous history of cardiac arrest. Results not clinically significant and no cardiac consult needed per Dr. Garsia.     Patient is able to complete PRO forms in English are required to complete a pre-study D9426G Patient Reported Outcomes (PRO) Questionnaire and a pre-study O2234X EQ-5D Questionnaire. They were completed on 8/2/2017 within 14 days prior to registration on 8/9/2017.     Patients biomarker profiling results did not indicate the presence of an EGFR mutation or EML4/ALK fusion     Patient progressed (in the opinion of Dr. Garsia) following the most recent line of therapy. See CT scan on 7/19/2017 and MD note on 7/27/2017.     Patient has not received any prior systemic therapy (systemic chemotherapy, immunotherapy or investigational drug) within 21 days prior to sub-study registration. Last chemo given on 7/7/2017.      Patient has recovered (? Grade 1) from any side effects of prior therapy.     Patient has not received radiation therapy within the 14 days of registration.     Patient has measurable disease documented by CT on 7/19/2017. CT scan is within the 28 days prior to sub-study registration window.     Patient had a negative MRI scan of the brain on 8/2/2017 to evaluate for CNS disease. It is within 42 days prior to sub-study registration.     Patient does not have leptomeningeal disease, spinal cord compression or brain metastases     Patient has not had any major surgery within 14 days prior to sub-study registration.     Patient is not planning to receive any concurrent chemotherapy, immunotherapy, biologic or hormonal therapy for cancer treatment    Patient meets lab criteria per labs on 8/2/2017. Patients last ANC was 3800, platelet count was 569,000, and hemoglobin was 9.3 g/dL .Labs were obtained within 28 days prior to  sub-study registration.    Patient has adequate hepatic function. Bilirubin is 0.4 mg/dL , ALT is 10 U/L, and AST is 17 U/L.  Labs were done within 28 days prior to sub-study registration on 8/2/2017.     Patients serum creatinine calculated creatinine clearance is ? 50 mL/min using the Cockroft Gault equation. Calculated CrCl is 51.72ml/min. This test was performed on 8/2/2017 (within 28 days prior to sub-study registration)    Patients Zubrod performance status is 1 per Dr. Garsia. Documented on 8/9/2017 (within 28 days prior to sub-study registration).     Prestudy history and physical exam was obtained on 8/9/20147. (within 28 days prior to sub-study registration).     Patient has no prior malignancy in the last 5 years per medical history     Patient knows he is responsible for beginning effect contraceptive measures and should prevent possibilities of fathering a child.    As a part of the OPEN registration process the treating institution's identity is provided in order to ensure that the current (within 365 days) date of institutional review board approval for this study has been entered in the system.     Patient does not have impaired decision-making capacity    Patient was informed of the investigational nature of this study and and signed written informed consent on 7/27/2017.

## 2017-08-14 ENCOUNTER — RESEARCH ENCOUNTER (OUTPATIENT)
Dept: RESEARCH | Facility: HOSPITAL | Age: 67
End: 2017-08-14

## 2017-08-14 ENCOUNTER — INFUSION (OUTPATIENT)
Dept: INFUSION THERAPY | Facility: HOSPITAL | Age: 67
End: 2017-08-14
Attending: INTERNAL MEDICINE
Payer: MEDICARE

## 2017-08-14 VITALS
HEART RATE: 95 BPM | WEIGHT: 120.13 LBS | BODY MASS INDEX: 22.11 KG/M2 | SYSTOLIC BLOOD PRESSURE: 151 MMHG | TEMPERATURE: 98 F | OXYGEN SATURATION: 96 % | HEIGHT: 62 IN | DIASTOLIC BLOOD PRESSURE: 80 MMHG | RESPIRATION RATE: 18 BRPM

## 2017-08-14 DIAGNOSIS — C34.91 PRIMARY CANCER OF RIGHT LUNG: Primary | ICD-10-CM

## 2017-08-14 PROCEDURE — 25000003 PHARM REV CODE 250: Performed by: INTERNAL MEDICINE

## 2017-08-14 PROCEDURE — 96413 CHEMO IV INFUSION 1 HR: CPT

## 2017-08-14 PROCEDURE — 96417 CHEMO IV INFUS EACH ADDL SEQ: CPT

## 2017-08-14 RX ORDER — SODIUM CHLORIDE 0.9 % (FLUSH) 0.9 %
10 SYRINGE (ML) INJECTION
Status: CANCELLED | OUTPATIENT
Start: 2017-08-14

## 2017-08-14 RX ORDER — HEPARIN 100 UNIT/ML
500 SYRINGE INTRAVENOUS
Status: DISCONTINUED | OUTPATIENT
Start: 2017-08-14 | End: 2017-08-14 | Stop reason: HOSPADM

## 2017-08-14 RX ORDER — SODIUM CHLORIDE 0.9 % (FLUSH) 0.9 %
10 SYRINGE (ML) INJECTION
Status: DISCONTINUED | OUTPATIENT
Start: 2017-08-14 | End: 2017-08-14 | Stop reason: HOSPADM

## 2017-08-14 RX ORDER — HEPARIN 100 UNIT/ML
500 SYRINGE INTRAVENOUS
Status: CANCELLED | OUTPATIENT
Start: 2017-08-14

## 2017-08-14 RX ADMIN — SODIUM CHLORIDE: 9 INJECTION, SOLUTION INTRAVENOUS at 11:08

## 2017-08-14 NOTE — PLAN OF CARE
Problem: Patient Care Overview  Goal: Plan of Care Review  Outcome: Ongoing (interventions implemented as appropriate)  Pt received INV Opdivo and Yervoy with no complications. Pt instructed to call MD with any problems. AVS given to patient and patient discharged home.

## 2017-08-14 NOTE — PROGRESS NOTES
"Protocol:  Lung MAP  Sub study:  I  Arm 1- Nivolumab/Ipilumumab  Sub Investigator: Gomez Garsia DO  Treating Physician: Gomez Garsia DO  Pt Initials: KEYLA RAMIRES  Patient ID: [id number]  IRB#: [id number]    August 14, 2017    Cycle 1 Day 1 Nivolumab/Ipilumumab    Patient seen in chemotherapy clinic today for cycle 1 day 1 of  Nivolumab/Ipilumumab. Patient is AAOx 3 and states willingness to continue participation in study. Baseline AEs assessed prior to chemotherapy administration. His current complaints include cough, tickle in his throat, headache, fatigue, and shortness of breath on exertion. Discussed s/s of infusion related reaction. Instructed to notify me if he has to seek medical attention prior to our next visit. Instructed to call physician and myself for any questions or concerns. Cycle 2 appointments printed and given to patient and wife.     Patient's dose was based off of weight of 54 kg taken on 8/9/2017.  Nivolumab 162 mg and Ipilumumab 54 mg are doses of drugs to be given.     Weight- 54.5kg  Height- 157.5m  BSA- 1.54 m2  BP- 117/75  HR- 108  RR- 18  T- 98.3F  O2- 96%    Baseline AEs and medical history    Anemia Grade 2- Chronic. NCS  Anorexia Grade 1- Appetite has improved per patient    Cough Grade 2- Chronic cough. Patient states that he has a constant tickling in his throat.   Dyspnea Grade 2- Dyspnea with minimal exertion. Relieved at rest  Fatigue Grade 1- Relieved with rest per patient. Patient states that he stays in bed because he "feels lazy", but can still carryout ADLs without limitations.  Generalized muscle weakness Grade 1- Patient states he feels a little weak.   Headache Grade 1- Intermittent and no intervention indicated  Hypertension - Controlled with antihypertensives per home med lists.  Hyponatremia Grade 1- Ongoing and no interventions taken. NCS  Non cardiac chest pain Grade 1- Patient has history of cracked ribs and sternum.   Pain Grade 1-  Pain to flank. Patient has " history of cracked ribs and sternum. Controlled with prescription pain medications  Paresthesia Grade 1- Occasional tingling and cold sensation to fingertips. Does not interfere with ADLs.   Serum amylase elevated Grade 1- History of pancreatitis.  Not clinically significant per Dr. Garsia.   Sinus tachycardia Grade 1- Asymptomatic.  Weight loss Grade 1- 5 to <10% from baseline; intervention not indicated  Wheezing Grade 2- Relieved by nebulizers and inhalers.

## 2017-08-20 ENCOUNTER — HOSPITAL ENCOUNTER (EMERGENCY)
Facility: HOSPITAL | Age: 67
Discharge: HOME | End: 2017-08-20
Attending: EMERGENCY MEDICINE | Admitting: EMERGENCY MEDICINE
Payer: MEDICARE

## 2017-08-20 VITALS
SYSTOLIC BLOOD PRESSURE: 132 MMHG | RESPIRATION RATE: 20 BRPM | OXYGEN SATURATION: 97 % | WEIGHT: 122 LBS | TEMPERATURE: 98 F | BODY MASS INDEX: 22.45 KG/M2 | HEART RATE: 99 BPM | DIASTOLIC BLOOD PRESSURE: 83 MMHG | HEIGHT: 62 IN

## 2017-08-20 DIAGNOSIS — R06.2 WHEEZING: ICD-10-CM

## 2017-08-20 DIAGNOSIS — R06.02 SHORTNESS OF BREATH: ICD-10-CM

## 2017-08-20 LAB
ALBUMIN SERPL BCP-MCNC: 3.6 G/DL
ALP SERPL-CCNC: 96 U/L
ALT SERPL W/O P-5'-P-CCNC: 10 U/L
ANION GAP SERPL CALC-SCNC: 7 MMOL/L
AST SERPL-CCNC: 17 U/L
BASOPHILS # BLD AUTO: 0.02 K/UL
BASOPHILS NFR BLD: 0.2 %
BILIRUB SERPL-MCNC: 0.4 MG/DL
BNP SERPL-MCNC: 169 PG/ML
BUN SERPL-MCNC: 9 MG/DL
CALCIUM SERPL-MCNC: 9.8 MG/DL
CHLORIDE SERPL-SCNC: 95 MMOL/L
CO2 SERPL-SCNC: 30 MMOL/L
CREAT SERPL-MCNC: 1 MG/DL
DIFFERENTIAL METHOD: ABNORMAL
EOSINOPHIL # BLD AUTO: 1.1 K/UL
EOSINOPHIL NFR BLD: 11.2 %
ERYTHROCYTE [DISTWIDTH] IN BLOOD BY AUTOMATED COUNT: 14.4 %
EST. GFR  (AFRICAN AMERICAN): >60 ML/MIN/1.73 M^2
EST. GFR  (NON AFRICAN AMERICAN): >60 ML/MIN/1.73 M^2
GLUCOSE SERPL-MCNC: 125 MG/DL
HCT VFR BLD AUTO: 29.8 %
HGB BLD-MCNC: 9.6 G/DL
INR PPP: 1
LYMPHOCYTES # BLD AUTO: 1.1 K/UL
LYMPHOCYTES NFR BLD: 11.3 %
MCH RBC QN AUTO: 29.4 PG
MCHC RBC AUTO-ENTMCNC: 32.2 G/DL
MCV RBC AUTO: 91 FL
MONOCYTES # BLD AUTO: 1 K/UL
MONOCYTES NFR BLD: 10.6 %
NEUTROPHILS # BLD AUTO: 6.3 K/UL
NEUTROPHILS NFR BLD: 66.6 %
PLATELET # BLD AUTO: 380 K/UL
PMV BLD AUTO: 8.9 FL
POTASSIUM SERPL-SCNC: 4.3 MMOL/L
PROT SERPL-MCNC: 8.7 G/DL
PROTHROMBIN TIME: 11 SEC
RBC # BLD AUTO: 3.27 M/UL
SODIUM SERPL-SCNC: 132 MMOL/L
WBC # BLD AUTO: 9.52 K/UL

## 2017-08-20 PROCEDURE — 85610 PROTHROMBIN TIME: CPT

## 2017-08-20 PROCEDURE — 93005 ELECTROCARDIOGRAM TRACING: CPT

## 2017-08-20 PROCEDURE — 93010 ELECTROCARDIOGRAM REPORT: CPT | Mod: ,,, | Performed by: INTERNAL MEDICINE

## 2017-08-20 PROCEDURE — 94761 N-INVAS EAR/PLS OXIMETRY MLT: CPT

## 2017-08-20 PROCEDURE — 85025 COMPLETE CBC W/AUTO DIFF WBC: CPT

## 2017-08-20 PROCEDURE — 94640 AIRWAY INHALATION TREATMENT: CPT

## 2017-08-20 PROCEDURE — 63600175 PHARM REV CODE 636 W HCPCS: Performed by: EMERGENCY MEDICINE

## 2017-08-20 PROCEDURE — 25000242 PHARM REV CODE 250 ALT 637 W/ HCPCS: Performed by: EMERGENCY MEDICINE

## 2017-08-20 PROCEDURE — 96374 THER/PROPH/DIAG INJ IV PUSH: CPT

## 2017-08-20 PROCEDURE — 80053 COMPREHEN METABOLIC PANEL: CPT

## 2017-08-20 PROCEDURE — 83880 ASSAY OF NATRIURETIC PEPTIDE: CPT

## 2017-08-20 PROCEDURE — 99285 EMERGENCY DEPT VISIT HI MDM: CPT | Mod: 25

## 2017-08-20 PROCEDURE — 99285 EMERGENCY DEPT VISIT HI MDM: CPT | Mod: ,,, | Performed by: EMERGENCY MEDICINE

## 2017-08-20 RX ORDER — MOXIFLOXACIN HYDROCHLORIDE 400 MG/1
400 TABLET ORAL DAILY
Qty: 7 TABLET | Refills: 0 | Status: SHIPPED | OUTPATIENT
Start: 2017-08-20 | End: 2017-10-09 | Stop reason: HOSPADM

## 2017-08-20 RX ORDER — METHYLPREDNISOLONE SOD SUCC 125 MG
125 VIAL (EA) INJECTION
Status: COMPLETED | OUTPATIENT
Start: 2017-08-20 | End: 2017-08-20

## 2017-08-20 RX ORDER — ALBUTEROL SULFATE 90 UG/1
2 AEROSOL, METERED RESPIRATORY (INHALATION) EVERY 6 HOURS PRN
Qty: 1 INHALER | Refills: 0 | Status: SHIPPED | OUTPATIENT
Start: 2017-08-20

## 2017-08-20 RX ORDER — PREDNISONE 20 MG/1
40 TABLET ORAL DAILY
Qty: 14 TABLET | Refills: 0 | Status: SHIPPED | OUTPATIENT
Start: 2017-08-20 | End: 2017-08-27

## 2017-08-20 RX ORDER — IPRATROPIUM BROMIDE AND ALBUTEROL SULFATE 2.5; .5 MG/3ML; MG/3ML
3 SOLUTION RESPIRATORY (INHALATION)
Status: COMPLETED | OUTPATIENT
Start: 2017-08-20 | End: 2017-08-20

## 2017-08-20 RX ORDER — IPRATROPIUM BROMIDE AND ALBUTEROL SULFATE 2.5; .5 MG/3ML; MG/3ML
3 SOLUTION RESPIRATORY (INHALATION) EVERY 4 HOURS PRN
Qty: 20 VIAL | Refills: 0 | Status: SHIPPED | OUTPATIENT
Start: 2017-08-20

## 2017-08-20 RX ADMIN — METHYLPREDNISOLONE SODIUM SUCCINATE 125 MG: 125 INJECTION, POWDER, FOR SOLUTION INTRAMUSCULAR; INTRAVENOUS at 07:08

## 2017-08-20 RX ADMIN — IPRATROPIUM BROMIDE AND ALBUTEROL SULFATE 3 ML: .5; 3 SOLUTION RESPIRATORY (INHALATION) at 08:08

## 2017-08-20 RX ADMIN — IPRATROPIUM BROMIDE AND ALBUTEROL SULFATE 3 ML: .5; 3 SOLUTION RESPIRATORY (INHALATION) at 09:08

## 2017-08-20 NOTE — ED TRIAGE NOTES
onset SOB and wheezing  For last 6 days after starting a new chemo for lung  cancer. Out of his neb meds - has been using machine hourly. Audible wheezing.  Yellowish/ brown mucus but denies fever,  Has left sided chest pain when coughing

## 2017-08-20 NOTE — ED NOTES
LOC: The patient is awake and alert; oriented x 3 and speaking appropriately.  APPEARANCE: Patient resting comfortably, patient is clean and well groomed  SKIN: warm and dry, normal skin turgor & moist mucus membranes, skin intact, no breakdown noted.  MUSCULOSKELETAL: Patient moving all extremities well, no obvious swelling or deformities noted  RESPIRATORY: Airway is open and patent, wheezing and crackles throughout all lung fields; respirations are spontaneous, increased effort and rate  CARDIAC: Patient has a normal rate, no peripheral edema noted, capillary refill < 3 seconds; No complaints of chest pain   ABDOMEN: Soft and non tender to palpation, no distention noted. Bowel sounds present x 4

## 2017-08-20 NOTE — ED NOTES
Pt ambulated in results waiting room States has no shortness of breath ,states he feels better after treatment  Wheezing auscultated to all lung fields

## 2017-08-20 NOTE — ED PROVIDER NOTES
Encounter Date: 8/20/2017    SCRIBE #1 NOTE: Jacob PEMBERTON, am scribing for, and in the presence of, Dr. Cedeño.       History     Chief Complaint   Patient presents with    Shortness of Breath      sob x 3 days. recent radiation for lung cancer. out of nebulizer medications.  c/o wheezing - audible wheezing noted in triage     Time seen by provider: 7:20 AM    This is a 67 y.o. male with history of lung cancer with recurrence this year, treated previously with chemo and radiation, on chemotherapy for the last 2 months. It looks like he started on immunotherapy and had his first dose 6 days ago. He presents with complaint of SOB x 6 days. He endorses associated yellow-brown productive cough and also mild chest pain only when he coughs. He states the SOB began immediately after he had received immunotherapy 6 days ago. He states he was sent home with home nebulizer which he has had to use  4-6 times a day with only transient relief and is now out of nebulizer treatments. Pt admits to history of smoking with mild chronic wheezing, usually nebs 1-2 per day before, but states that his wheezing is now much worse for the last 6 days. He denies fever, leg swelling, hemoptysis.      The history is provided by the patient and medical records.     Review of patient's allergies indicates:  No Known Allergies  Past Medical History:   Diagnosis Date    Exam for clinical trial 8/9/2017    History of cardiac arrest     Hyperlipidemia     Hypertension     Mediastinal lymphadenopathy 5/24/2017    Primary cancer of right lung 4/6/2017    Recurrent non-small cell lung cancer 5/24/2017     Past Surgical History:   Procedure Laterality Date    HERNIA REPAIR       Family History   Problem Relation Age of Onset    Cancer Mother      Social History   Substance Use Topics    Smoking status: Former Smoker     Packs/day: 1.50     Years: 48.00     Types: Cigarettes    Smokeless tobacco: Never Used      Comment: Quit 7/2016     Alcohol use 10.2 oz/week     15 Cans of beer, 2 Shots of liquor per week      Comment: occasional     Review of Systems   Constitutional: Negative for fever.   HENT: Negative for sore throat.    Respiratory: Positive for cough, shortness of breath and wheezing.    Cardiovascular: Positive for chest pain.   Gastrointestinal: Negative for nausea.   Genitourinary: Negative for dysuria.   Musculoskeletal: Negative for back pain.   Skin: Negative for rash.   Neurological: Negative for weakness.   Hematological: Does not bruise/bleed easily.       Physical Exam     Initial Vitals   BP Pulse Resp Temp SpO2   08/20/17 0645 08/20/17 0645 08/20/17 0645 08/20/17 0645 08/20/17 0646   139/88 94 18 97.4 °F (36.3 °C) 97 %      MAP       08/20/17 0645       105         Physical Exam    Nursing note and vitals reviewed.  Constitutional: He appears well-developed and well-nourished. No distress.   HENT:   Mouth/Throat: Oropharynx is clear and moist. No oropharyngeal exudate.   Neck: Normal range of motion. Neck supple.   Cardiovascular: Normal rate, regular rhythm and normal heart sounds.   No murmur heard.  Pulmonary/Chest: He has wheezes (Diffuse inspiratory and expiratory wheezes). He has no rhonchi. He has no rales.   Abdominal: Soft. There is no tenderness. There is no rebound and no guarding.   Musculoskeletal: He exhibits no edema.   Neurological: He is alert and oriented to person, place, and time. He has normal strength. No cranial nerve deficit or sensory deficit.   Skin: No rash noted.         ED Course   Procedures  Labs Reviewed   CBC W/ AUTO DIFFERENTIAL - Abnormal; Notable for the following:        Result Value    RBC 3.27 (*)     Hemoglobin 9.6 (*)     Hematocrit 29.8 (*)     Platelets 380 (*)     MPV 8.9 (*)     Eos # 1.1 (*)     Lymph% 11.3 (*)     Eosinophil% 11.2 (*)     All other components within normal limits   COMPREHENSIVE METABOLIC PANEL - Abnormal; Notable for the following:     Sodium 132 (*)     CO2 30  (*)     Glucose 125 (*)     Total Protein 8.7 (*)     Anion Gap 7 (*)     All other components within normal limits   B-TYPE NATRIURETIC PEPTIDE - Abnormal; Notable for the following:      (*)     All other components within normal limits   PROTIME-INR             Medical Decision Making:   History:   Old Medical Records: I decided to obtain old medical records.  Initial Assessment:       67 y.o. male with history of right lung cancer s/p chemo and radiation last year with recurrence now. He was started on immunotherapy 6 days ago after he had progression of disease while on chemo that he restarted 2 months ago. He was a former smoker and had mild wheezing previously but it has significantly worsened in the past 6 days. Diffuse wheezing on exam but no hypoxia. No sign of fluid overload or CHF and is afebrile. Likely COPD exacerbation or side effects of immunotherapy. Will treat with solumedrol and nebs and check basic labs to check for acute process or pneumonia. He has pleuritic CP, no sign ACS on EKG, unlikely to be PE since no hypoxia/tachycardia.    11:02 AM. Labs with no acute findings, with chronic anemia and known lung mass on x-ray with no definite pneumonia. After solumedrol and nebs, patient much improved with mild residual expiratory wheezing. He is ambulatory in the ER with no hypoxia or dyspnea on exertion. Dicussed this case with Dr. Peraza, Oncology fellow, who agrees that pt is stable for discharge. Will start patient on prednisone for 7 days as well as Avelox to cover any underlying infection since pt is immunocompromised.  Also refilled pt's albuterol. He will return for any worsening SOB and follow up with Oncology as scheduled.       Clinical Tests:   Lab Tests: Ordered and Reviewed  Radiological Study: Ordered and Reviewed  Medical Tests: Reviewed and Ordered            Scribe Attestation:   Scribe #1: I performed the above scribed service and the documentation accurately describes the  services I performed. I attest to the accuracy of the note.    Attending Attestation:           Physician Attestation for Scribe:  Physician Attestation Statement for Scribe #1: I, Dr. Cedeño, reviewed documentation, as scribed by Jacob Moreira in my presence, and it is both accurate and complete.                 ED Course     Clinical Impression:   Diagnoses of Shortness of breath and Wheezing were pertinent to this visit.                           Walt Cedeño MD  08/22/17 0000

## 2017-08-23 ENCOUNTER — RESEARCH ENCOUNTER (OUTPATIENT)
Dept: RESEARCH | Facility: HOSPITAL | Age: 67
End: 2017-08-23

## 2017-08-23 DIAGNOSIS — C34.91 PRIMARY CANCER OF RIGHT LUNG: Primary | ICD-10-CM

## 2017-08-23 RX ORDER — PREDNISONE 10 MG/1
10 TABLET ORAL DAILY
Qty: 11 TABLET | Refills: 0 | Status: SHIPPED | OUTPATIENT
Start: 2017-08-23 | End: 2017-09-02

## 2017-08-23 NOTE — PROGRESS NOTES
"Protocol:  Lung MAP  Sub Study Z1244B  Sub Investigator: Gomez Garsia DO  Treating Physician: Gomez Garsia DO  Pt Initials: GEGE  Patient ID: [id number]  IRB#: [id number]    Note to file    Patient was seen in the ED on Sunday 8/19/2017 for SOB, cough, and wheezing post C1 of Nivolumab/Ipilumumab.  He received Solumedrol 125mg IVP, Duoneb treatments, was prescribed Avelox 400mg daily, and prednisone 40mg daily while in the ED. This was not study drug related and is felt to be a COPD exacerbation according to Dr. Garsia. I called and spoke with patient and he is feeling a little better. He states that he has some mucus that feels like it is stuck in his throat. Dr. Garsia states that patient can try Mucinex OTC to see if it helps. He also prescribed steroids (prednisone 10 mg tablets) and plans to taper dose. I called and spoke to patient and wife and instructed him to take 4 pills on day 1, 3 pills on day 2, 2 pills on day 3, 1 pill on day 4, and a 1/2 pill on day 5. The medical monitor states that " Patient can stay on the trial. The steroids will be a protocol deviation. He can resume treatment once his prednisone is down to 10 mg."  Patient will return for cycle 2 with Nivolumab on Monday 8/28/2017. Instructed patient to call with any questions or concerns before then. I reinforced that if he needs to seek medical attention before then to notify us. I also reinforced the importance of telling other treating physicians that he is on clinical trial. He verbalized understanding.      I spoke with patient and reviewed ED notes to grade AEs. Per table 16.1 of protocol the following AEs are not required to be reported since they are less than grade 3 and did not require hospitalization.     Dyspnea Grade 2  Wheezing Grade 2  Cough Grade 2    "

## 2017-08-25 ENCOUNTER — TELEPHONE (OUTPATIENT)
Dept: RESEARCH | Facility: HOSPITAL | Age: 67
End: 2017-08-25

## 2017-08-25 DIAGNOSIS — C34.90 RECURRENT NON-SMALL CELL LUNG CANCER: Primary | ICD-10-CM

## 2017-08-25 DIAGNOSIS — Z00.6 EXAMINATION OF PARTICIPANT IN CLINICAL TRIAL: ICD-10-CM

## 2017-08-25 NOTE — TELEPHONE ENCOUNTER
"I called to check on patient to see how he was feeling and see if he had any questions or concerns. He is tapering his steroids as prescribed. He took 30 mg today. He states, " I feel like it is helping me." He asked if it would be ok to take 2 percocet for pain instead of 1 to help with his pain. I discussed this with Dr. Garsia. He is ok with patient continuing MS Contin 15 mg every 12 hours and increasing his percocet to two 5-325 mg tablets every 4-6 hours as needed. I discussed with patient the risks of constipation. I encouraged him to drink plenty of fluids, make sure he ambulates frequently, and takes stool softeners as needed. Encouraged patient to call for any questions or concerns.  "

## 2017-08-28 ENCOUNTER — OFFICE VISIT (OUTPATIENT)
Dept: HEMATOLOGY/ONCOLOGY | Facility: CLINIC | Age: 67
End: 2017-08-28
Payer: MEDICARE

## 2017-08-28 ENCOUNTER — RESEARCH ENCOUNTER (OUTPATIENT)
Dept: RESEARCH | Facility: HOSPITAL | Age: 67
End: 2017-08-28

## 2017-08-28 ENCOUNTER — INFUSION (OUTPATIENT)
Dept: INFUSION THERAPY | Facility: HOSPITAL | Age: 67
End: 2017-08-28
Attending: INTERNAL MEDICINE
Payer: MEDICARE

## 2017-08-28 VITALS
SYSTOLIC BLOOD PRESSURE: 105 MMHG | HEART RATE: 92 BPM | DIASTOLIC BLOOD PRESSURE: 69 MMHG | BODY MASS INDEX: 22.63 KG/M2 | WEIGHT: 123 LBS | RESPIRATION RATE: 18 BRPM | TEMPERATURE: 98 F | HEIGHT: 62 IN

## 2017-08-28 VITALS
TEMPERATURE: 98 F | HEART RATE: 89 BPM | WEIGHT: 123.44 LBS | OXYGEN SATURATION: 98 % | RESPIRATION RATE: 18 BRPM | DIASTOLIC BLOOD PRESSURE: 64 MMHG | BODY MASS INDEX: 22.58 KG/M2 | SYSTOLIC BLOOD PRESSURE: 102 MMHG

## 2017-08-28 DIAGNOSIS — G89.3 CANCER ASSOCIATED PAIN: ICD-10-CM

## 2017-08-28 DIAGNOSIS — C34.90 RECURRENT NON-SMALL CELL LUNG CANCER: Primary | ICD-10-CM

## 2017-08-28 DIAGNOSIS — C34.91 PRIMARY CANCER OF RIGHT LUNG: Primary | ICD-10-CM

## 2017-08-28 DIAGNOSIS — C34.90 MALIGNANT NEOPLASM OF LUNG, UNSPECIFIED LATERALITY, UNSPECIFIED PART OF LUNG: ICD-10-CM

## 2017-08-28 DIAGNOSIS — Z00.6 EXAM FOR CLINICAL TRIAL: ICD-10-CM

## 2017-08-28 PROCEDURE — 3008F BODY MASS INDEX DOCD: CPT | Mod: S$GLB,,, | Performed by: INTERNAL MEDICINE

## 2017-08-28 PROCEDURE — 96413 CHEMO IV INFUSION 1 HR: CPT

## 2017-08-28 PROCEDURE — 25000003 PHARM REV CODE 250: Performed by: INTERNAL MEDICINE

## 2017-08-28 PROCEDURE — 99999 PR PBB SHADOW E&M-EST. PATIENT-LVL IV: CPT | Mod: PBBFAC,,, | Performed by: INTERNAL MEDICINE

## 2017-08-28 PROCEDURE — 3074F SYST BP LT 130 MM HG: CPT | Mod: S$GLB,,, | Performed by: INTERNAL MEDICINE

## 2017-08-28 PROCEDURE — 3078F DIAST BP <80 MM HG: CPT | Mod: S$GLB,,, | Performed by: INTERNAL MEDICINE

## 2017-08-28 PROCEDURE — 99214 OFFICE O/P EST MOD 30 MIN: CPT | Mod: S$GLB,,, | Performed by: INTERNAL MEDICINE

## 2017-08-28 RX ORDER — SODIUM CHLORIDE 0.9 % (FLUSH) 0.9 %
10 SYRINGE (ML) INJECTION
Status: CANCELLED | OUTPATIENT
Start: 2017-08-28

## 2017-08-28 RX ORDER — OXYCODONE AND ACETAMINOPHEN 5; 325 MG/1; MG/1
1 TABLET ORAL EVERY 4 HOURS PRN
Qty: 90 TABLET | Refills: 0 | Status: SHIPPED | OUTPATIENT
Start: 2017-08-28 | End: 2017-09-25

## 2017-08-28 RX ORDER — HEPARIN 100 UNIT/ML
500 SYRINGE INTRAVENOUS
Status: CANCELLED | OUTPATIENT
Start: 2017-08-28

## 2017-08-28 RX ORDER — SODIUM CHLORIDE 0.9 % (FLUSH) 0.9 %
10 SYRINGE (ML) INJECTION
Status: DISCONTINUED | OUTPATIENT
Start: 2017-08-28 | End: 2017-08-28 | Stop reason: HOSPADM

## 2017-08-28 RX ADMIN — SODIUM CHLORIDE: 0.9 INJECTION, SOLUTION INTRAVENOUS at 09:08

## 2017-08-28 NOTE — PLAN OF CARE
Problem: Chemotherapy Effects (Adult)  Goal: Signs and Symptoms of Listed Potential Problems Will be Absent, Minimized or Managed (Chemotherapy Effects)  Signs and symptoms of listed potential problems will be absent, minimized or managed by discharge/transition of care (reference Chemotherapy Effects (Adult) CPG).   Outcome: Ongoing (interventions implemented as appropriate)  Pt here for opdivo infusion, pt with some complaints of shortness of breath pt states doing breathing treatments at home which he states is helping, labs, hx, meds, allergies reviewed. Reclined in chair, warm blanket provided, spouse with pt.

## 2017-08-28 NOTE — PLAN OF CARE
Problem: Patient Care Overview  Goal: Plan of Care Review  Outcome: Ongoing (interventions implemented as appropriate)  Pt tolerated opdivo infusion without issue, avs given, rtc 9/11/17, no distress noted upon d/c to home

## 2017-08-28 NOTE — PROGRESS NOTES
PATIENT: Karsten Salazar  MRN: [id number]  DATE: 8/28/2017      Diagnosis:   1. Recurrent non-small cell lung cancer    2. Exam for clinical trial    3. Cancer associated pain    4. Malignant neoplasm of lung, unspecified laterality, unspecified part of lung        Chief Complaint: Lung Cancer      Oncologic History:      Oncologic History Non-small cell lung cancer diagnosed 6/2016   Recurrent disease to lung 5/2017    Oncologic Treatment Concurrent chemoXRT with weekly carboplatin and paclitaxel, XRT to a dose of 5840 cGy 10/31/2016  Carboplatin/gemcitabine 6/2/2017 - 7/2017 (discontinued due to progression)  Ipilimumab plus nivolumab  clinical trial 8/14/17     Pathology Squamous cell carcinoma, T3, N2, M0 (6/2016), PD-L1 TPS 1%        Subjective:    Interval History: Mr. Salazar is a 67 y.o. male who is seen in follow-up for lung cancer.  Since I had seen him last he was started on a clinical trial using ipilimumab and nivolumab.  He received his first cycle on 8/14/17.  He tolerated this well.  He was seen in the ER with some shortness of breath and placed on antibiotics and steroids for a COPD exacerbation.  No evidence of pneumonitis.  Symptoms have improved and he has taken 5 mg prednisone tablets over the last 2 days.  No other new complaints.      He has previously seen Dr. Ubaldo Thomason at [facility].  His history dates to 6/2016 when he sought medical attention for shortness of breath and cough.  He was found to have a large lung mass in the right upper lobe with mediastinal lymphadenopathy.  He underwent subsequent workup, however, symptoms worsened and progressed to him having a cardiac arrest requiring CPR and prolonged ICU stay.  Eventually a biopsy was performed showing squamous cell lung cancer.  This was initially staged as a T3, N2.  He was treated with concurrent chemotherapy and radiation until 10/31/16.  He is an on surveillance since this time.  His insurance changed and is now following and  [facility].  Repeat PET/CT had shown locally recurrent disease.  MRI of the brain was negative.  He was started on carboplatin and gemcitabine on 6/2/17.  CT on 7/19/17 and had indicated progressive disease.  He was started on ipilimumab and nivolumab on a clinical trial on 8/14/17.      Past Medical History:   Past Medical History:   Diagnosis Date    Cancer associated pain 8/28/2017    Exam for clinical trial 8/9/2017    History of cardiac arrest     Hyperlipidemia     Hypertension     Mediastinal lymphadenopathy 5/24/2017    Primary cancer of right lung 4/6/2017    Recurrent non-small cell lung cancer 5/24/2017       Past Surgical HIstory:   Past Surgical History:   Procedure Laterality Date    HERNIA REPAIR         Family History:   Family History   Problem Relation Age of Onset    Cancer Mother        Social History:  reports that he has quit smoking. His smoking use included Cigarettes. He has a 72.00 pack-year smoking history. He has never used smokeless tobacco. He reports that he drinks about 10.2 oz of alcohol per week . He reports that he does not use drugs.    Allergies:  Review of patient's allergies indicates:  No Known Allergies    Medications:  Current Outpatient Prescriptions   Medication Sig Dispense Refill    albuterol 90 mcg/actuation inhaler Inhale 2 puffs into the lungs every 6 (six) hours as needed for Wheezing. 1 Inhaler 0    albuterol-ipratropium 2.5mg-0.5mg/3mL (DUO-NEB) 0.5 mg-3 mg(2.5 mg base)/3 mL nebulizer solution Take 3 mLs by nebulization every 4 (four) hours as needed for Wheezing. 20 vial 0    aspirin (ECOTRIN) 81 MG EC tablet Take 81 mg by mouth.      atorvastatin (LIPITOR) 40 MG tablet Take 40 mg by mouth.      benzonatate (TESSALON) 100 MG capsule Take 100 mg by mouth.      carvedilol (COREG) 3.125 MG tablet TAKE 1 TABLET BY MOUTH TWICE DAILY 180 tablet 0    carvedilol (COREG) 3.125 MG tablet TAKE 1 TABLET BY MOUTH TWICE DAILY 60 tablet 0    cyproheptadine  (PERIACTIN) 4 mg tablet Take 1 tablet (4 mg total) by mouth 3 (three) times daily. 90 tablet 2    guaifenesin-codeine 100-10 mg/5 ml (TUSSI-ORGANIDIN NR)  mg/5 mL syrup Take 5 mLs by mouth 3 (three) times daily as needed for Cough.      lisinopril (PRINIVIL,ZESTRIL) 2.5 MG tablet Take 2.5 mg by mouth.      morphine (MS CONTIN) 15 MG 12 hr tablet Take 1 tablet (15 mg total) by mouth 2 (two) times daily. 60 tablet 0    moxifloxacin (AVELOX) 400 mg tablet Take 1 tablet (400 mg total) by mouth once daily. 7 tablet 0    ondansetron (ZOFRAN, AS HYDROCHLORIDE,) 4 MG tablet Take 1 tablet (4 mg total) by mouth every 8 (eight) hours as needed for Nausea. 60 tablet 1    oxycodone-acetaminophen (PERCOCET) 5-325 mg per tablet Take 1 tablet by mouth every 4 (four) hours as needed for Pain. 90 tablet 0    predniSONE (DELTASONE) 10 MG tablet Take 1 tablet (10 mg total) by mouth once daily. Take 4 pills day 1, 3 pills day 2, 2 pills day 3, 1 pill day 4, 1/2 pill day 5 11 tablet 0    tiotropium (SPIRIVA) 18 mcg inhalation capsule Inhale 1 capsule (18 mcg total) into the lungs once daily. 30 capsule 1     No current facility-administered medications for this visit.        Review of Systems   Constitutional: Negative for appetite change, chills, fatigue, fever and unexpected weight change.   HENT: Negative for dental problem, sinus pressure and sneezing.    Eyes: Negative for visual disturbance.   Respiratory: Positive for cough and shortness of breath. Negative for choking and chest tightness.    Cardiovascular: Positive for chest pain. Negative for leg swelling.   Gastrointestinal: Negative for abdominal pain, blood in stool, constipation, diarrhea and nausea.   Genitourinary: Negative for difficulty urinating and dysuria.   Musculoskeletal: Negative for arthralgias and back pain.   Skin: Negative for rash and wound.   Neurological: Negative for dizziness, light-headedness and headaches.   Hematological: Negative for  adenopathy. Does not bruise/bleed easily.   Psychiatric/Behavioral: Negative for sleep disturbance. The patient is not nervous/anxious.        ECOG Performance Status:   ECOG SCORE    1 - Restricted in strenuous activity-ambulatory and able to carry out work of a light nature         Objective:      Vitals:   Vitals:    08/28/17 0822   BP: 102/64   BP Location: Right arm   Patient Position: Sitting   BP Method: Medium (Automatic)   Pulse: 89   Resp: 18   Temp: 98.4 °F (36.9 °C)   TempSrc: Oral   SpO2: 98%   Weight: 56 kg (123 lb 7.3 oz)     BMI: Body mass index is 22.58 kg/m².    Physical Exam   Constitutional: He is oriented to person, place, and time. He appears well-developed and well-nourished.   HENT:   Head: Normocephalic and atraumatic.   Eyes: Pupils are equal, round, and reactive to light.   Neck: Normal range of motion. Neck supple.   Cardiovascular: Normal rate and regular rhythm.    Pulmonary/Chest: Effort normal and breath sounds normal. No respiratory distress.   Abdominal: Soft. He exhibits no distension. There is no tenderness.   Musculoskeletal: He exhibits no edema or tenderness.   Lymphadenopathy:     He has no cervical adenopathy.   Neurological: He is alert and oriented to person, place, and time. No cranial nerve deficit.   Skin: Skin is warm and dry.   Psychiatric: He has a normal mood and affect. His behavior is normal.       Laboratory Data:  Lab Visit on 08/28/2017   Component Date Value Ref Range Status    Sodium 08/28/2017 131* 136 - 145 mmol/L Final    Potassium 08/28/2017 4.0  3.5 - 5.1 mmol/L Final    Chloride 08/28/2017 97  95 - 110 mmol/L Final    CO2 08/28/2017 28  23 - 29 mmol/L Final    Glucose 08/28/2017 118* 70 - 110 mg/dL Final    BUN, Bld 08/28/2017 16  8 - 23 mg/dL Final    Creatinine 08/28/2017 1.1  0.5 - 1.4 mg/dL Final    Calcium 08/28/2017 8.6* 8.7 - 10.5 mg/dL Final    Total Protein 08/28/2017 6.9  6.0 - 8.4 g/dL Final    Albumin 08/28/2017 3.0* 3.5 - 5.2 g/dL  Final    Total Bilirubin 08/28/2017 0.4  0.1 - 1.0 mg/dL Final    Comment: For infants and newborns, interpretation of results should be based  on gestational age, weight and in agreement with clinical  observations.  Premature Infant recommended reference ranges:  Up to 24 hours.............<8.0 mg/dL  Up to 48 hours............<12.0 mg/dL  3-5 days..................<15.0 mg/dL  6-29 days.................<15.0 mg/dL      Alkaline Phosphatase 08/28/2017 90  55 - 135 U/L Final    AST 08/28/2017 11  10 - 40 U/L Final    ALT 08/28/2017 18  10 - 44 U/L Final    Anion Gap 08/28/2017 6* 8 - 16 mmol/L Final    eGFR if African American 08/28/2017 >60.0  >60 mL/min/1.73 m^2 Final    eGFR if non African American 08/28/2017 >60.0  >60 mL/min/1.73 m^2 Final    Comment: Calculation used to obtain the estimated glomerular filtration  rate (eGFR) is the CKD-EPI equation. Since race is unknown   in our information system, the eGFR values for   -American and Non--American patients are given   for each creatinine result.      Magnesium 08/28/2017 1.7  1.6 - 2.6 mg/dL Final    WBC 08/28/2017 14.15* 3.90 - 12.70 K/uL Final    RBC 08/28/2017 3.50* 4.60 - 6.20 M/uL Final    Hemoglobin 08/28/2017 10.4* 14.0 - 18.0 g/dL Final    Hematocrit 08/28/2017 31.1* 40.0 - 54.0 % Final    MCV 08/28/2017 89  82 - 98 fL Final    MCH 08/28/2017 29.7  27.0 - 31.0 pg Final    MCHC 08/28/2017 33.4  32.0 - 36.0 g/dL Final    RDW 08/28/2017 15.0* 11.5 - 14.5 % Final    Platelets 08/28/2017 400* 150 - 350 K/uL Final    MPV 08/28/2017 8.8* 9.2 - 12.9 fL Final    Gran # 08/28/2017 10.7* 1.8 - 7.7 K/uL Final    Lymph # 08/28/2017 1.7  1.0 - 4.8 K/uL Final    Mono # 08/28/2017 1.1* 0.3 - 1.0 K/uL Final    Eos # 08/28/2017 0.6* 0.0 - 0.5 K/uL Final    Baso # 08/28/2017 0.01  0.00 - 0.20 K/uL Final    Gran% 08/28/2017 75.8* 38.0 - 73.0 % Final    Lymph% 08/28/2017 11.9* 18.0 - 48.0 % Final    Mono% 08/28/2017 7.8  4.0 - 15.0  % Final    Eosinophil% 08/28/2017 4.0  0.0 - 8.0 % Final    Basophil% 08/28/2017 0.1  0.0 - 1.9 % Final    Differential Method 08/28/2017 Automated   Final         Imaging:    CT 7/19/17  CT CHEST, ABDOMEN, and, PELVIS  with IV contrast    Protocol:  Axial images of the chest, abdomen, and pelvis were acquired  after the use of 75 cc [id number] IV contrast.  Coronal and sagittal reconstructions were also obtained    HISTORY:  67 year old M with malignant neoplasm of right bronchus.    COMPARISON: CT outside hospital 03/08/2017 and 11/29/2016     FINDINGS:     Aorta: Normal in course and caliber, without significant atherosclerotic plaque. There are three branching vessels at the arch.       Heart: Normal in size.  Small pericardial effusion. There is calcification of the aortic valve.  There is calcific coronary atherosclerosis.    Anna Marie/Mediastinum: No significant lymphadenopathy     Lungs: There is evidence of a centrally necrotic mass within the right upper lobe, measuring 5.6 cm in its greatest diameter (axial series 2 image 17), compared to 5.8 cm on most recent prior imaging.  There is circumferential pleural thickening surrounding the right upper lobe, with evidence of a small volume of right-sided pleural fluid.  There is surrounding consolidation and groundglass opacities within the right upper lobe, middle lobe and right lower lobe, possibly reflecting changes of radiation therapy.  There is narrowing of the right upper lobe bronchus and vessels.    There has been interval enlargement of mass within the medial basal segment of the right lower lobe, measuring 2.8 cm (axial series 2 image 34), compared to 1.7 cm previously.  This mass has a decreased attenuation centrally, possibly reflecting presence of necrosis.        There is stable   fibrocalcific scar within the left upper lobe anteriorly.  There are no new focal masses, consolidations or nodules.  There are emphysematous changes of the lungs  bilaterally.  There are left-sided apical blebs.     Liver: Normal in size and attenuation, with no focal hepatic lesions. There is interposition in of the colon along the posterior-lateral aspect of the liver.      Gallbladder: There are multiple gallstones, within a nondistended gallbladder.  No evidence of cholecystitis.     Bile Ducts: No evidence of dilated ducts.     Pancreas: There are multiple dystrophic calcifications within the head and uncinate process of the pancreas, suggestive of chronic pancreatitis.      Spleen: Unremarkable.     Adrenals: Unremarkable.     Kidneys/ Ureters: Normal in size and location. Normal concentration and excretion of contrast. No hydronephrosis or nephrolithiasis. No ureteral dilatation.     Bladder: No evidence of wall thickening.     Reproductive organs: Unremarkable.     GI Tract/Mesentery: No evidence of bowel obstruction or inflammation.     Peritoneal Space: There is a small volume of ascites within the pelvis. No free air.     Retroperitoneum:  No significant adenopathy.      Abdominal wall:  There is a small right sided inguinal hernia, containing a loop of small bowel.  No inflammatory changes of the bowel within this territory.      Vasculature: Significant calcific atherosclerosis of the aorta, without aneurysmal dilatation.     Bones: There are multiple anterior rib fractures, 2 of which appear chronic and nonhealed bilaterally.  There is also a nonhealed sternal fracture, which is minimally displaced.  These findings may be secondary to prior trauma or chest compressions.  Surgical changes from placement of intramedullary vesna and screw within the right femur, for a fracture Age-appropriate degenerative changes are noted.   Impression        2 right lung masses, consistent with malignancy.  The right upper lobe mass appears stable in size, and there has been interval growth of the right lower lobe mass.    Consolidation and groundglass opacities surrounding the  right upper lobe mass, possibly reflecting changes of radiation therapy.  Note is made of stenosis involving the right upper lobe bronchi and vasculature.    Pleural thickening surrounding the right upper lobe, with a small pleural effusion.    Right inguinal hernia containing a loop of small bowel.    Remote non-healing and healed fractures of multiple anterior ribs bilaterally and the sternum, indicating prior trauma.    Additional findings include:  -Cholelithiasis, without cholecystitis  -Changes of chronic pancreatitis  -Intramedullary vesna and screw within the right femur    RECIST SUMMARY: Date of prior examination for comparison  outside CT 3/8/17    Lesion 1: Right upper lobe 5.6 cm Series 2 Image 17 Prior measurement 5.8 cm  Lesion 2: Right lower lobe 2.8 cm Series 2 Image 34 Prior measurement 1.7 cm                   Assessment:       1. Recurrent non-small cell lung cancer    2. Exam for clinical trial    3. Cancer associated pain    4. Malignant neoplasm of lung, unspecified laterality, unspecified part of lung           Plan:     Mr. Salazar has begun on treatment with ipilimumab and nivolumab on clinical trial.  He is tolerating treatment well.  Labs are appropriate to continue treatment.  Discontinue steroids at this point.  Follow-up with me per protocol.    Gomez Garsia DO, FACP  Hematology & Oncology  [facility] [place] [address]  ph. [phone]  Fax. [phone]    25 minutes were spent in coordination of patient's care, record review and counseling.  More than 50% of the time was face-to-face.

## 2017-08-28 NOTE — PROGRESS NOTES
"Protocol:  Lung MAP  Sub study:  I  Arm 1- Nivolumab/Ipilumumab  Sub Investigator: Gomez Garsia DO  Treating Physician: Gomez Garsia DO  Pt Initials: KEYLA RAMIRES  Patient ID: [id number]  IRB#: [id number]    August 28, 2017    Cycle 2 Nivolumab    Patient seen in clinic today for cycle 2 of Nivolumab. Patient is AAOx 3 and states willingness to continue participation in study. PRO questionairres completed by patient. His current complaints include cough, tickle in his throat, and shortness of breath on exertion. He has started Mucinex DM to see if it will help with cough and that feeling that phelgm is caught in his throat. He states that the pain in his right side subsides after taking 2 percocet for pain. He does not take the morphine any more because " it doesn't help." He did have some constipation relieved by one dose of MOM. Patient should start taking a stool softener daily and laxatives as needed per Dr. Garsia. Patient states that he has been sleeping better and his appetite has improved. Labs reviewed and assessment completed by Dr. Garsia. Patient is good to proceed with cycle 2 of Nivolumab. Discussed s/s of infusion related reaction. Instructed to notify clinical research nurse and physician if he has to seek medical attention prior to our next visit. Instructed to call physician and myself for any questions or concerns. Phone numbers and cycle 3 appointments given to patient and wife.     Weight has not changed by 10% therefore Nivolumab dose will remain 162 mg.    Weight- 56 kg  Height- 157.5m  BSA- 1.57 m2  BP- 102/62  HR- 89  RR- 18  T- 98.4F  O2- 98%     AEs     Anemia Grade 1- Improved. Chronic NCS  Anorexia Grade 1- Appetite has improved per patient    Cough Grade 2- Chronic cough. Patient states that he has a constant tickling in his throat. Trying Mucinex DM. Ongoing  Dyspnea Grade 2- Slightly improved but still grade 2. Relieved at rest.   Fatigue Grade 1- Relieved with rest per patient. " This has improved   Generalized muscle weakness Grade 1- Resolved  Headache Grade 1- Resolved  Hyponatremia Grade 1- Ongoing and no interventions taken. NCS  Non cardiac chest pain Grade 1- Patient has history of cracked ribs and sternum. Occasional pain improves with 2 percocet  Pain Grade 1-  Pain to flank. Patient has history of cracked ribs and sternum. Controlled with prescription pain medications  Paresthesia Grade 1- Occasional tingling and cold sensation to fingertips. Does not interfere with ADLs.   Serum amylase elevated Grade 1- History of pancreatitis.  Not clinically significant per Dr. Garsia. Per protocol not assessed until cycle 4  Sinus tachycardia Grade 1- Resolved   Weight loss Grade 1- 5 to <10% from baseline; intervention not indicated. Patient gained weight since previous visit. Will continue to monitor  Wheezing Grade 2- Relieved by nebulizers and inhalers. Occasional no changes reported.   Constipation Grade 1- Relieved by one dose of milk of magnesia

## 2017-09-04 ENCOUNTER — HOSPITAL ENCOUNTER (EMERGENCY)
Facility: HOSPITAL | Age: 67
Discharge: HOME | End: 2017-09-04
Attending: EMERGENCY MEDICINE | Admitting: EMERGENCY MEDICINE
Payer: MEDICARE

## 2017-09-04 VITALS
BODY MASS INDEX: 22.63 KG/M2 | RESPIRATION RATE: 20 BRPM | TEMPERATURE: 98 F | WEIGHT: 123 LBS | SYSTOLIC BLOOD PRESSURE: 148 MMHG | HEIGHT: 62 IN | OXYGEN SATURATION: 98 % | DIASTOLIC BLOOD PRESSURE: 76 MMHG | HEART RATE: 97 BPM

## 2017-09-04 DIAGNOSIS — R06.00 DYSPNEA: ICD-10-CM

## 2017-09-04 DIAGNOSIS — C34.90 RECURRENT NON-SMALL CELL LUNG CANCER: ICD-10-CM

## 2017-09-04 DIAGNOSIS — J44.1 COPD EXACERBATION: Primary | ICD-10-CM

## 2017-09-04 LAB
ANION GAP SERPL CALC-SCNC: 9 MMOL/L
BASOPHILS # BLD AUTO: 0.03 K/UL
BASOPHILS NFR BLD: 0.3 %
BUN SERPL-MCNC: 11 MG/DL
CALCIUM SERPL-MCNC: 9.7 MG/DL
CHLORIDE SERPL-SCNC: 94 MMOL/L
CO2 SERPL-SCNC: 28 MMOL/L
CREAT SERPL-MCNC: 1 MG/DL
DIFFERENTIAL METHOD: ABNORMAL
EOSINOPHIL # BLD AUTO: 0.9 K/UL
EOSINOPHIL NFR BLD: 9.5 %
ERYTHROCYTE [DISTWIDTH] IN BLOOD BY AUTOMATED COUNT: 14.6 %
EST. GFR  (AFRICAN AMERICAN): >60 ML/MIN/1.73 M^2
EST. GFR  (NON AFRICAN AMERICAN): >60 ML/MIN/1.73 M^2
GLUCOSE SERPL-MCNC: 85 MG/DL
HCT VFR BLD AUTO: 29.5 %
HGB BLD-MCNC: 9.7 G/DL
LYMPHOCYTES # BLD AUTO: 1.4 K/UL
LYMPHOCYTES NFR BLD: 14.9 %
MAGNESIUM SERPL-MCNC: 2.2 MG/DL
MCH RBC QN AUTO: 29.5 PG
MCHC RBC AUTO-ENTMCNC: 32.9 G/DL
MCV RBC AUTO: 90 FL
MONOCYTES # BLD AUTO: 1.2 K/UL
MONOCYTES NFR BLD: 12.9 %
NEUTROPHILS # BLD AUTO: 5.9 K/UL
NEUTROPHILS NFR BLD: 62.2 %
PLATELET # BLD AUTO: 335 K/UL
PMV BLD AUTO: 9.9 FL
POTASSIUM SERPL-SCNC: 4.7 MMOL/L
RBC # BLD AUTO: 3.29 M/UL
SODIUM SERPL-SCNC: 131 MMOL/L
WBC # BLD AUTO: 9.55 K/UL

## 2017-09-04 PROCEDURE — 83735 ASSAY OF MAGNESIUM: CPT

## 2017-09-04 PROCEDURE — 99284 EMERGENCY DEPT VISIT MOD MDM: CPT

## 2017-09-04 PROCEDURE — 93010 ELECTROCARDIOGRAM REPORT: CPT | Mod: ,,, | Performed by: INTERNAL MEDICINE

## 2017-09-04 PROCEDURE — 99285 EMERGENCY DEPT VISIT HI MDM: CPT | Mod: ,,, | Performed by: EMERGENCY MEDICINE

## 2017-09-04 PROCEDURE — 25000242 PHARM REV CODE 250 ALT 637 W/ HCPCS: Performed by: EMERGENCY MEDICINE

## 2017-09-04 PROCEDURE — 85025 COMPLETE CBC W/AUTO DIFF WBC: CPT

## 2017-09-04 PROCEDURE — 94640 AIRWAY INHALATION TREATMENT: CPT

## 2017-09-04 PROCEDURE — 80048 BASIC METABOLIC PNL TOTAL CA: CPT

## 2017-09-04 RX ORDER — FLUTICASONE PROPIONATE 220 UG/1
1 AEROSOL, METERED RESPIRATORY (INHALATION)
Status: COMPLETED | OUTPATIENT
Start: 2017-09-04 | End: 2017-09-04

## 2017-09-04 RX ORDER — IPRATROPIUM BROMIDE AND ALBUTEROL SULFATE 2.5; .5 MG/3ML; MG/3ML
3 SOLUTION RESPIRATORY (INHALATION)
Status: COMPLETED | OUTPATIENT
Start: 2017-09-04 | End: 2017-09-04

## 2017-09-04 RX ORDER — FLUTICASONE PROPIONATE 110 UG/1
2 AEROSOL, METERED RESPIRATORY (INHALATION) 2 TIMES DAILY
Qty: 12 G | Refills: 0 | Status: SHIPPED | OUTPATIENT
Start: 2017-09-04 | End: 2018-09-04

## 2017-09-04 RX ADMIN — IPRATROPIUM BROMIDE AND ALBUTEROL SULFATE 3 ML: .5; 3 SOLUTION RESPIRATORY (INHALATION) at 04:09

## 2017-09-04 RX ADMIN — FLUTICASONE PROPIONATE 1 PUFF: 220 AEROSOL, METERED RESPIRATORY (INHALATION) at 08:09

## 2017-09-04 NOTE — ED TRIAGE NOTES
Pt arrives to ED #16 with complaints of SOB since last night - used his nebulizer machine at home without relief - audible wheezes in all lobes with congestion noted - states productive cough of yellow sputum - denies fevers

## 2017-09-05 NOTE — ED NOTES
Inhaler with spacer use explained and demonstrated to patient. Patient verbalized and demonstrated understanding.   
Lungs clear at present - await disposition    
Spoke with pharmacy. States will tube inhaler shortly.   
Borderline diabetes mellitus  Controlled with diet  CAD (coronary artery disease)    Dyslipidemia    H/O heart artery stent    History of MI (myocardial infarction)    HTN (hypertension), benign    Legally blind in left eye, as defined in [place]  20/400 left eye  Migraines  Developed at 11 years of age  Last episode 2 years ago  Vertigo

## 2017-09-07 DIAGNOSIS — Z00.6 EXAMINATION OF PARTICIPANT IN CLINICAL TRIAL: ICD-10-CM

## 2017-09-07 DIAGNOSIS — R53.82 CHRONIC FATIGUE: ICD-10-CM

## 2017-09-07 DIAGNOSIS — C34.90 RECURRENT NON-SMALL CELL LUNG CANCER: Primary | ICD-10-CM

## 2017-09-11 ENCOUNTER — LAB VISIT (OUTPATIENT)
Dept: LAB | Facility: HOSPITAL | Age: 67
End: 2017-09-11
Attending: INTERNAL MEDICINE
Payer: MEDICARE

## 2017-09-11 ENCOUNTER — INFUSION (OUTPATIENT)
Dept: INFUSION THERAPY | Facility: HOSPITAL | Age: 67
End: 2017-09-11
Attending: INTERNAL MEDICINE
Payer: MEDICARE

## 2017-09-11 ENCOUNTER — DOCUMENTATION ONLY (OUTPATIENT)
Dept: HEMATOLOGY/ONCOLOGY | Facility: CLINIC | Age: 67
End: 2017-09-11

## 2017-09-11 ENCOUNTER — RESEARCH ENCOUNTER (OUTPATIENT)
Dept: RESEARCH | Facility: HOSPITAL | Age: 67
End: 2017-09-11

## 2017-09-11 ENCOUNTER — OFFICE VISIT (OUTPATIENT)
Dept: HEMATOLOGY/ONCOLOGY | Facility: CLINIC | Age: 67
End: 2017-09-11
Payer: MEDICARE

## 2017-09-11 VITALS
DIASTOLIC BLOOD PRESSURE: 82 MMHG | BODY MASS INDEX: 21.73 KG/M2 | OXYGEN SATURATION: 99 % | RESPIRATION RATE: 18 BRPM | SYSTOLIC BLOOD PRESSURE: 110 MMHG | WEIGHT: 118.81 LBS | HEART RATE: 107 BPM | TEMPERATURE: 98 F

## 2017-09-11 VITALS
DIASTOLIC BLOOD PRESSURE: 69 MMHG | TEMPERATURE: 98 F | SYSTOLIC BLOOD PRESSURE: 113 MMHG | RESPIRATION RATE: 16 BRPM | HEART RATE: 104 BPM

## 2017-09-11 DIAGNOSIS — Z00.6 EXAM FOR CLINICAL TRIAL: ICD-10-CM

## 2017-09-11 DIAGNOSIS — C34.90 RECURRENT NON-SMALL CELL LUNG CANCER: ICD-10-CM

## 2017-09-11 DIAGNOSIS — G89.3 CANCER ASSOCIATED PAIN: ICD-10-CM

## 2017-09-11 DIAGNOSIS — E46 PROTEIN CALORIE MALNUTRITION: ICD-10-CM

## 2017-09-11 DIAGNOSIS — Z00.6 EXAMINATION OF PARTICIPANT IN CLINICAL TRIAL: ICD-10-CM

## 2017-09-11 DIAGNOSIS — C34.91 PRIMARY CANCER OF RIGHT LUNG: Primary | ICD-10-CM

## 2017-09-11 DIAGNOSIS — C34.91 PRIMARY LUNG CANCER, RIGHT: ICD-10-CM

## 2017-09-11 DIAGNOSIS — J43.2 CENTRILOBULAR EMPHYSEMA: ICD-10-CM

## 2017-09-11 DIAGNOSIS — C34.90 RECURRENT NON-SMALL CELL LUNG CANCER: Primary | ICD-10-CM

## 2017-09-11 DIAGNOSIS — Z09 CHEMOTHERAPY FOLLOW-UP EXAMINATION: ICD-10-CM

## 2017-09-11 LAB
ALBUMIN SERPL BCP-MCNC: 3.2 G/DL
ALP SERPL-CCNC: 88 U/L
ALT SERPL W/O P-5'-P-CCNC: 10 U/L
ANION GAP SERPL CALC-SCNC: 8 MMOL/L
AST SERPL-CCNC: 12 U/L
BASOPHILS # BLD AUTO: 0.02 K/UL
BASOPHILS NFR BLD: 0.3 %
BILIRUB SERPL-MCNC: 0.5 MG/DL
BUN SERPL-MCNC: 9 MG/DL
CALCIUM SERPL-MCNC: 9.8 MG/DL
CHLORIDE SERPL-SCNC: 94 MMOL/L
CO2 SERPL-SCNC: 28 MMOL/L
CREAT SERPL-MCNC: 0.9 MG/DL
DIFFERENTIAL METHOD: ABNORMAL
EOSINOPHIL # BLD AUTO: 1.1 K/UL
EOSINOPHIL NFR BLD: 14.1 %
ERYTHROCYTE [DISTWIDTH] IN BLOOD BY AUTOMATED COUNT: 14.1 %
EST. GFR  (AFRICAN AMERICAN): >60 ML/MIN/1.73 M^2
EST. GFR  (NON AFRICAN AMERICAN): >60 ML/MIN/1.73 M^2
GLUCOSE SERPL-MCNC: 119 MG/DL
HCT VFR BLD AUTO: 30.7 %
HGB BLD-MCNC: 10.1 G/DL
LYMPHOCYTES # BLD AUTO: 1 K/UL
LYMPHOCYTES NFR BLD: 13.2 %
MAGNESIUM SERPL-MCNC: 1.6 MG/DL
MCH RBC QN AUTO: 29.4 PG
MCHC RBC AUTO-ENTMCNC: 32.9 G/DL
MCV RBC AUTO: 89 FL
MONOCYTES # BLD AUTO: 0.9 K/UL
MONOCYTES NFR BLD: 11.7 %
NEUTROPHILS # BLD AUTO: 4.8 K/UL
NEUTROPHILS NFR BLD: 60.6 %
PLATELET # BLD AUTO: 413 K/UL
PMV BLD AUTO: 8.9 FL
POTASSIUM SERPL-SCNC: 4 MMOL/L
PROT SERPL-MCNC: 8.3 G/DL
RBC # BLD AUTO: 3.44 M/UL
SODIUM SERPL-SCNC: 130 MMOL/L
WBC # BLD AUTO: 7.87 K/UL

## 2017-09-11 PROCEDURE — 25000003 PHARM REV CODE 250: Performed by: INTERNAL MEDICINE

## 2017-09-11 PROCEDURE — 80053 COMPREHEN METABOLIC PANEL: CPT

## 2017-09-11 PROCEDURE — 85025 COMPLETE CBC W/AUTO DIFF WBC: CPT

## 2017-09-11 PROCEDURE — 99215 OFFICE O/P EST HI 40 MIN: CPT | Mod: Q1,S$GLB,, | Performed by: INTERNAL MEDICINE

## 2017-09-11 PROCEDURE — 99999 PR PBB SHADOW E&M-EST. PATIENT-LVL IV: CPT | Mod: PBBFAC,,, | Performed by: INTERNAL MEDICINE

## 2017-09-11 PROCEDURE — A4216 STERILE WATER/SALINE, 10 ML: HCPCS | Performed by: INTERNAL MEDICINE

## 2017-09-11 PROCEDURE — 96413 CHEMO IV INFUSION 1 HR: CPT

## 2017-09-11 PROCEDURE — 83735 ASSAY OF MAGNESIUM: CPT

## 2017-09-11 PROCEDURE — 36415 COLL VENOUS BLD VENIPUNCTURE: CPT

## 2017-09-11 RX ORDER — HEPARIN 100 UNIT/ML
500 SYRINGE INTRAVENOUS
Status: CANCELLED | OUTPATIENT
Start: 2017-09-11

## 2017-09-11 RX ORDER — HEPARIN 100 UNIT/ML
500 SYRINGE INTRAVENOUS
Status: DISCONTINUED | OUTPATIENT
Start: 2017-09-11 | End: 2017-09-11 | Stop reason: HOSPADM

## 2017-09-11 RX ORDER — SODIUM CHLORIDE 0.9 % (FLUSH) 0.9 %
10 SYRINGE (ML) INJECTION
Status: CANCELLED | OUTPATIENT
Start: 2017-09-11

## 2017-09-11 RX ORDER — DRONABINOL 5 MG/1
5 CAPSULE ORAL
Qty: 60 CAPSULE | Refills: 0 | Status: SHIPPED | OUTPATIENT
Start: 2017-09-11 | End: 2018-01-24 | Stop reason: SDUPTHER

## 2017-09-11 RX ORDER — SODIUM CHLORIDE 0.9 % (FLUSH) 0.9 %
10 SYRINGE (ML) INJECTION
Status: DISCONTINUED | OUTPATIENT
Start: 2017-09-11 | End: 2017-09-11 | Stop reason: HOSPADM

## 2017-09-11 RX ADMIN — SODIUM CHLORIDE, PRESERVATIVE FREE 10 ML: 5 INJECTION INTRAVENOUS at 12:09

## 2017-09-11 RX ADMIN — SODIUM CHLORIDE: 9 INJECTION, SOLUTION INTRAVENOUS at 11:09

## 2017-09-11 NOTE — PROGRESS NOTES
Received referral that patient is in need of a nebulizer machine. I reached out to the patient to discuss if he a preference on providers. He stated that he did not have a preference. Sent referral to DME direct for processing. Also discussed with patient about difficulty affording his ensure. He stated that he does struggle with affording it. Offered to order Boost for him through patient assistance. He stated that he has never had Boost before and would be willing to try the vanilla flavor. Ordered case of vanilla boost for him. I let him know that I would contact him when it came in for him to .

## 2017-09-11 NOTE — PROGRESS NOTES
PATIENT: Karsten Salazar  MRN: [id number]  DATE: 9/11/2017      Diagnosis:   1. Recurrent non-small cell lung cancer    2. Exam for clinical trial    3. Chemotherapy follow-up examination    4. Cancer associated pain    5. Centrilobular emphysema    6. Protein calorie malnutrition        Chief Complaint: Lung Cancer      Oncologic History:      Oncologic History Non-small cell lung cancer diagnosed 6/2016   Recurrent disease to lung 5/2017    Oncologic Treatment Concurrent chemoXRT with weekly carboplatin and paclitaxel, XRT to a dose of 5840 cGy 10/31/2016  Carboplatin/gemcitabine 6/2/2017 - 7/2017 (discontinued due to progression)  Ipilimumab plus nivolumab  clinical trial 8/14/17     Pathology Squamous cell carcinoma, T3, N2, M0 (6/2016), PD-L1 TPS 1%        Subjective:    Interval History: Mr. Salazar is a 67 y.o. male who is seen in follow-up for lung cancer.  Since I had seen him last he was seen again in the emergency room with a COPD exacerbation.  He continues to have some ongoing shortness of breath and cough productive of yellow sputum.  He has noted a decrease in his weight associated with decreased appetite.  Pain is relatively unchanged.  No other new complaints.    He has previously seen Dr. Ubaldo Thomason at [facility].  His history dates to 6/2016 when he sought medical attention for shortness of breath and cough.  He was found to have a large lung mass in the right upper lobe with mediastinal lymphadenopathy.  He underwent subsequent workup, however, symptoms worsened and progressed to him having a cardiac arrest requiring CPR and prolonged ICU stay.  Eventually a biopsy was performed showing squamous cell lung cancer.  This was initially staged as a T3, N2.  He was treated with concurrent chemotherapy and radiation until 10/31/16.  He is an on surveillance since this time.  His insurance changed and is now following and [facility].  Repeat PET/CT had shown locally recurrent disease.  MRI of the brain was  negative.  He was started on carboplatin and gemcitabine on 6/2/17.  CT on 7/19/17 and had indicated progressive disease.  He was started on ipilimumab and nivolumab on a clinical trial on 8/14/17.      Past Medical History:   Past Medical History:   Diagnosis Date    Cancer associated pain 8/28/2017    Centrilobular emphysema 9/11/2017    Exam for clinical trial 8/9/2017    History of cardiac arrest     Hyperlipidemia     Hypertension     Mediastinal lymphadenopathy 5/24/2017    Primary cancer of right lung 4/6/2017    Protein calorie malnutrition 9/11/2017    Recurrent non-small cell lung cancer 5/24/2017       Past Surgical HIstory:   Past Surgical History:   Procedure Laterality Date    HERNIA REPAIR         Family History:   Family History   Problem Relation Age of Onset    Cancer Mother        Social History:  reports that he has quit smoking. His smoking use included Cigarettes. He has a 72.00 pack-year smoking history. He has never used smokeless tobacco. He reports that he drinks about 10.2 oz of alcohol per week . He reports that he does not use drugs.    Allergies:  Review of patient's allergies indicates:  No Known Allergies    Medications:  Current Outpatient Prescriptions   Medication Sig Dispense Refill    albuterol 90 mcg/actuation inhaler Inhale 2 puffs into the lungs every 6 (six) hours as needed for Wheezing. 1 Inhaler 0    albuterol-ipratropium 2.5mg-0.5mg/3mL (DUO-NEB) 0.5 mg-3 mg(2.5 mg base)/3 mL nebulizer solution Take 3 mLs by nebulization every 4 (four) hours as needed for Wheezing. 20 vial 0    aspirin (ECOTRIN) 81 MG EC tablet Take 81 mg by mouth.      atorvastatin (LIPITOR) 40 MG tablet Take 40 mg by mouth.      benzonatate (TESSALON) 100 MG capsule Take 100 mg by mouth.      carvedilol (COREG) 3.125 MG tablet TAKE 1 TABLET BY MOUTH TWICE DAILY 180 tablet 0    carvedilol (COREG) 3.125 MG tablet TAKE 1 TABLET BY MOUTH TWICE DAILY 60 tablet 0    cyproheptadine  (PERIACTIN) 4 mg tablet Take 1 tablet (4 mg total) by mouth 3 (three) times daily. 90 tablet 2    dronabinol (MARINOL) 5 MG capsule Take 1 capsule (5 mg total) by mouth 2 (two) times daily before meals. 60 capsule 0    fluticasone (FLOVENT HFA) 110 mcg/actuation inhaler Inhale 2 puffs into the lungs 2 (two) times daily. Controller 12 g 0    guaifenesin-codeine 100-10 mg/5 ml (TUSSI-ORGANIDIN NR)  mg/5 mL syrup Take 5 mLs by mouth 3 (three) times daily as needed for Cough.      lisinopril (PRINIVIL,ZESTRIL) 2.5 MG tablet Take 2.5 mg by mouth.      morphine (MS CONTIN) 15 MG 12 hr tablet Take 1 tablet (15 mg total) by mouth 2 (two) times daily. 60 tablet 0    moxifloxacin (AVELOX) 400 mg tablet Take 1 tablet (400 mg total) by mouth once daily. 7 tablet 0    ondansetron (ZOFRAN, AS HYDROCHLORIDE,) 4 MG tablet Take 1 tablet (4 mg total) by mouth every 8 (eight) hours as needed for Nausea. 60 tablet 1    oxycodone-acetaminophen (PERCOCET) 5-325 mg per tablet Take 1 tablet by mouth every 4 (four) hours as needed for Pain. 90 tablet 0    tiotropium (SPIRIVA) 18 mcg inhalation capsule Inhale 1 capsule (18 mcg total) into the lungs once daily. 30 capsule 1     No current facility-administered medications for this visit.        Review of Systems   Constitutional: Positive for appetite change and unexpected weight change. Negative for chills, fatigue and fever.   HENT: Negative for dental problem, sinus pressure and sneezing.    Eyes: Negative for visual disturbance.   Respiratory: Positive for cough and shortness of breath. Negative for choking and chest tightness.    Cardiovascular: Positive for chest pain. Negative for leg swelling.   Gastrointestinal: Negative for abdominal pain, blood in stool, constipation, diarrhea and nausea.   Genitourinary: Negative for difficulty urinating and dysuria.   Musculoskeletal: Negative for arthralgias and back pain.   Skin: Negative for rash and wound.   Neurological:  Negative for dizziness, light-headedness and headaches.   Hematological: Negative for adenopathy. Does not bruise/bleed easily.   Psychiatric/Behavioral: Negative for sleep disturbance. The patient is not nervous/anxious.        ECOG Performance Status:   ECOG SCORE    2 - Capable of all selfcare but unable to carry out any work activities, active > 50% of hours         Objective:      Vitals:   Vitals:    09/11/17 0919   BP: 110/82   BP Location: Right arm   Patient Position: Sitting   BP Method: Medium (Automatic)   Pulse: 107   Resp: 18   Temp: 98.2 °F (36.8 °C)   TempSrc: Oral   SpO2: 99%   Weight: 53.9 kg (118 lb 13.3 oz)     BMI: Body mass index is 21.73 kg/m².    Physical Exam   Constitutional: He is oriented to person, place, and time. He appears well-developed and well-nourished.   HENT:   Head: Normocephalic and atraumatic.   Eyes: Pupils are equal, round, and reactive to light.   Neck: Normal range of motion. Neck supple.   Cardiovascular: Normal rate and regular rhythm.    Pulmonary/Chest: Effort normal and breath sounds normal. No respiratory distress.   Abdominal: Soft. He exhibits no distension. There is no tenderness.   Musculoskeletal: He exhibits no edema or tenderness.   Lymphadenopathy:     He has no cervical adenopathy.   Neurological: He is alert and oriented to person, place, and time. No cranial nerve deficit.   Skin: Skin is warm and dry.   Psychiatric: He has a normal mood and affect. His behavior is normal.       Laboratory Data:  Lab Visit on 09/11/2017   Component Date Value Ref Range Status    Sodium 09/11/2017 130* 136 - 145 mmol/L Final    Potassium 09/11/2017 4.0  3.5 - 5.1 mmol/L Final    Chloride 09/11/2017 94* 95 - 110 mmol/L Final    CO2 09/11/2017 28  23 - 29 mmol/L Final    Glucose 09/11/2017 119* 70 - 110 mg/dL Final    BUN, Bld 09/11/2017 9  8 - 23 mg/dL Final    Creatinine 09/11/2017 0.9  0.5 - 1.4 mg/dL Final    Calcium 09/11/2017 9.8  8.7 - 10.5 mg/dL Final     Total Protein 09/11/2017 8.3  6.0 - 8.4 g/dL Final    Albumin 09/11/2017 3.2* 3.5 - 5.2 g/dL Final    Total Bilirubin 09/11/2017 0.5  0.1 - 1.0 mg/dL Final    Comment: For infants and newborns, interpretation of results should be based  on gestational age, weight and in agreement with clinical  observations.  Premature Infant recommended reference ranges:  Up to 24 hours.............<8.0 mg/dL  Up to 48 hours............<12.0 mg/dL  3-5 days..................<15.0 mg/dL  6-29 days.................<15.0 mg/dL      Alkaline Phosphatase 09/11/2017 88  55 - 135 U/L Final    AST 09/11/2017 12  10 - 40 U/L Final    ALT 09/11/2017 10  10 - 44 U/L Final    Anion Gap 09/11/2017 8  8 - 16 mmol/L Final    eGFR if African American 09/11/2017 >60.0  >60 mL/min/1.73 m^2 Final    eGFR if non African American 09/11/2017 >60.0  >60 mL/min/1.73 m^2 Final    Comment: Calculation used to obtain the estimated glomerular filtration  rate (eGFR) is the CKD-EPI equation. Since race is unknown   in our information system, the eGFR values for   -American and Non--American patients are given   for each creatinine result.      Magnesium 09/11/2017 1.6  1.6 - 2.6 mg/dL Final    WBC 09/11/2017 7.87  3.90 - 12.70 K/uL Final    RBC 09/11/2017 3.44* 4.60 - 6.20 M/uL Final    Hemoglobin 09/11/2017 10.1* 14.0 - 18.0 g/dL Final    Hematocrit 09/11/2017 30.7* 40.0 - 54.0 % Final    MCV 09/11/2017 89  82 - 98 fL Final    MCH 09/11/2017 29.4  27.0 - 31.0 pg Final    MCHC 09/11/2017 32.9  32.0 - 36.0 g/dL Final    RDW 09/11/2017 14.1  11.5 - 14.5 % Final    Platelets 09/11/2017 413* 150 - 350 K/uL Final    MPV 09/11/2017 8.9* 9.2 - 12.9 fL Final    Gran # 09/11/2017 4.8  1.8 - 7.7 K/uL Final    Lymph # 09/11/2017 1.0  1.0 - 4.8 K/uL Final    Mono # 09/11/2017 0.9  0.3 - 1.0 K/uL Final    Eos # 09/11/2017 1.1* 0.0 - 0.5 K/uL Final    Baso # 09/11/2017 0.02  0.00 - 0.20 K/uL Final    Gran% 09/11/2017 60.6  38.0 - 73.0  % Final    Lymph% 09/11/2017 13.2* 18.0 - 48.0 % Final    Mono% 09/11/2017 11.7  4.0 - 15.0 % Final    Eosinophil% 09/11/2017 14.1* 0.0 - 8.0 % Final    Basophil% 09/11/2017 0.3  0.0 - 1.9 % Final    Differential Method 09/11/2017 Automated   Final         Imaging:    CT 7/19/17  CT CHEST, ABDOMEN, and, PELVIS  with IV contrast    Protocol:  Axial images of the chest, abdomen, and pelvis were acquired  after the use of 75 cc [id number] IV contrast.  Coronal and sagittal reconstructions were also obtained    HISTORY:  67 year old M with malignant neoplasm of right bronchus.    COMPARISON: CT outside hospital 03/08/2017 and 11/29/2016     FINDINGS:     Aorta: Normal in course and caliber, without significant atherosclerotic plaque. There are three branching vessels at the arch.       Heart: Normal in size.  Small pericardial effusion. There is calcification of the aortic valve.  There is calcific coronary atherosclerosis.    Anna Marie/Mediastinum: No significant lymphadenopathy     Lungs: There is evidence of a centrally necrotic mass within the right upper lobe, measuring 5.6 cm in its greatest diameter (axial series 2 image 17), compared to 5.8 cm on most recent prior imaging.  There is circumferential pleural thickening surrounding the right upper lobe, with evidence of a small volume of right-sided pleural fluid.  There is surrounding consolidation and groundglass opacities within the right upper lobe, middle lobe and right lower lobe, possibly reflecting changes of radiation therapy.  There is narrowing of the right upper lobe bronchus and vessels.    There has been interval enlargement of mass within the medial basal segment of the right lower lobe, measuring 2.8 cm (axial series 2 image 34), compared to 1.7 cm previously.  This mass has a decreased attenuation centrally, possibly reflecting presence of necrosis.        There is stable   fibrocalcific scar within the left upper lobe anteriorly.  There are no new  focal masses, consolidations or nodules.  There are emphysematous changes of the lungs bilaterally.  There are left-sided apical blebs.     Liver: Normal in size and attenuation, with no focal hepatic lesions. There is interposition in of the colon along the posterior-lateral aspect of the liver.      Gallbladder: There are multiple gallstones, within a nondistended gallbladder.  No evidence of cholecystitis.     Bile Ducts: No evidence of dilated ducts.     Pancreas: There are multiple dystrophic calcifications within the head and uncinate process of the pancreas, suggestive of chronic pancreatitis.      Spleen: Unremarkable.     Adrenals: Unremarkable.     Kidneys/ Ureters: Normal in size and location. Normal concentration and excretion of contrast. No hydronephrosis or nephrolithiasis. No ureteral dilatation.     Bladder: No evidence of wall thickening.     Reproductive organs: Unremarkable.     GI Tract/Mesentery: No evidence of bowel obstruction or inflammation.     Peritoneal Space: There is a small volume of ascites within the pelvis. No free air.     Retroperitoneum:  No significant adenopathy.      Abdominal wall:  There is a small right sided inguinal hernia, containing a loop of small bowel.  No inflammatory changes of the bowel within this territory.      Vasculature: Significant calcific atherosclerosis of the aorta, without aneurysmal dilatation.     Bones: There are multiple anterior rib fractures, 2 of which appear chronic and nonhealed bilaterally.  There is also a nonhealed sternal fracture, which is minimally displaced.  These findings may be secondary to prior trauma or chest compressions.  Surgical changes from placement of intramedullary vesna and screw within the right femur, for a fracture Age-appropriate degenerative changes are noted.   Impression        2 right lung masses, consistent with malignancy.  The right upper lobe mass appears stable in size, and there has been interval growth of  the right lower lobe mass.    Consolidation and groundglass opacities surrounding the right upper lobe mass, possibly reflecting changes of radiation therapy.  Note is made of stenosis involving the right upper lobe bronchi and vasculature.    Pleural thickening surrounding the right upper lobe, with a small pleural effusion.    Right inguinal hernia containing a loop of small bowel.    Remote non-healing and healed fractures of multiple anterior ribs bilaterally and the sternum, indicating prior trauma.    Additional findings include:  -Cholelithiasis, without cholecystitis  -Changes of chronic pancreatitis  -Intramedullary vesna and screw within the right femur    RECIST SUMMARY: Date of prior examination for comparison  outside CT 3/8/17    Lesion 1: Right upper lobe 5.6 cm Series 2 Image 17 Prior measurement 5.8 cm  Lesion 2: Right lower lobe 2.8 cm Series 2 Image 34 Prior measurement 1.7 cm                   Assessment:       1. Recurrent non-small cell lung cancer    2. Exam for clinical trial    3. Chemotherapy follow-up examination    4. Cancer associated pain    5. Centrilobular emphysema    6. Protein calorie malnutrition           Plan:     Mr. Salazar will continue on with ipilimumab and nivolumab on trial.  I have reviewed his lab work which is appropriate to continue.  I will refer him to pulmonary given his COPD further assistance.  I have written him for a nebulizer chain.  Additionally, I have added Marinol given his decreased appetite and weight loss seen with malnutrition.  His pain is controlled.  Follow back up with me per protocol.    Gomez Garsia DO, FACP  Hematology & Oncology  [facility] [place] [address]  ph. [phone]  Fax. [phone]

## 2017-09-11 NOTE — PROGRESS NOTES
PATIENT: Karsten Salazar  MRN: [id number]  DATE: 9/11/2017      Diagnosis:   1. Recurrent non-small cell lung cancer    2. Exam for clinical trial    3. Chemotherapy follow-up examination    4. Cancer associated pain    5. Centrilobular emphysema        Chief Complaint: Lung Cancer      Oncologic History:      Oncologic History Non-small cell lung cancer diagnosed 6/2016   Recurrent disease to lung 5/2017    Oncologic Treatment Concurrent chemoXRT with weekly carboplatin and paclitaxel, XRT to a dose of 5840 cGy 10/31/2016  Carboplatin/gemcitabine 6/2/2017 - 7/2017 (discontinued due to progression)  Ipilimumab plus nivolumab  clinical trial 8/14/17     Pathology Squamous cell carcinoma, T3, N2, M0 (6/2016), PD-L1 TPS 1%        Subjective:    Interval History: Mr. Salazar is a 67 y.o. male who is seen in follow-up for lung cancer.  Since I had seen him last he was started on a clinical trial using ipilimumab and nivolumab.  He received his first cycle on 8/14/17.  He tolerated this well.  He was seen in the ER with some shortness of breath and placed on antibiotics and steroids for a COPD exacerbation.  No evidence of pneumonitis.  Symptoms have improved and he has taken 5 mg prednisone tablets over the last 2 days.  No other new complaints.      He has previously seen Dr. Ubaldo Thomason at [facility].  His history dates to 6/2016 when he sought medical attention for shortness of breath and cough.  He was found to have a large lung mass in the right upper lobe with mediastinal lymphadenopathy.  He underwent subsequent workup, however, symptoms worsened and progressed to him having a cardiac arrest requiring CPR and prolonged ICU stay.  Eventually a biopsy was performed showing squamous cell lung cancer.  This was initially staged as a T3, N2.  He was treated with concurrent chemotherapy and radiation until 10/31/16.  He is an on surveillance since this time.  His insurance changed and is now following and [facility].  Repeat  PET/CT had shown locally recurrent disease.  MRI of the brain was negative.  He was started on carboplatin and gemcitabine on 6/2/17.  CT on 7/19/17 and had indicated progressive disease.  He was started on ipilimumab and nivolumab on a clinical trial on 8/14/17.      Past Medical History:   Past Medical History:   Diagnosis Date    Cancer associated pain 8/28/2017    Centrilobular emphysema 9/11/2017    Exam for clinical trial 8/9/2017    History of cardiac arrest     Hyperlipidemia     Hypertension     Mediastinal lymphadenopathy 5/24/2017    Primary cancer of right lung 4/6/2017    Recurrent non-small cell lung cancer 5/24/2017       Past Surgical HIstory:   Past Surgical History:   Procedure Laterality Date    HERNIA REPAIR         Family History:   Family History   Problem Relation Age of Onset    Cancer Mother        Social History:  reports that he has quit smoking. His smoking use included Cigarettes. He has a 72.00 pack-year smoking history. He has never used smokeless tobacco. He reports that he drinks about 10.2 oz of alcohol per week . He reports that he does not use drugs.    Allergies:  Review of patient's allergies indicates:  No Known Allergies    Medications:  Current Outpatient Prescriptions   Medication Sig Dispense Refill    albuterol 90 mcg/actuation inhaler Inhale 2 puffs into the lungs every 6 (six) hours as needed for Wheezing. 1 Inhaler 0    albuterol-ipratropium 2.5mg-0.5mg/3mL (DUO-NEB) 0.5 mg-3 mg(2.5 mg base)/3 mL nebulizer solution Take 3 mLs by nebulization every 4 (four) hours as needed for Wheezing. 20 vial 0    aspirin (ECOTRIN) 81 MG EC tablet Take 81 mg by mouth.      atorvastatin (LIPITOR) 40 MG tablet Take 40 mg by mouth.      benzonatate (TESSALON) 100 MG capsule Take 100 mg by mouth.      carvedilol (COREG) 3.125 MG tablet TAKE 1 TABLET BY MOUTH TWICE DAILY 180 tablet 0    carvedilol (COREG) 3.125 MG tablet TAKE 1 TABLET BY MOUTH TWICE DAILY 60 tablet 0     cyproheptadine (PERIACTIN) 4 mg tablet Take 1 tablet (4 mg total) by mouth 3 (three) times daily. 90 tablet 2    fluticasone (FLOVENT HFA) 110 mcg/actuation inhaler Inhale 2 puffs into the lungs 2 (two) times daily. Controller 12 g 0    guaifenesin-codeine 100-10 mg/5 ml (TUSSI-ORGANIDIN NR)  mg/5 mL syrup Take 5 mLs by mouth 3 (three) times daily as needed for Cough.      lisinopril (PRINIVIL,ZESTRIL) 2.5 MG tablet Take 2.5 mg by mouth.      morphine (MS CONTIN) 15 MG 12 hr tablet Take 1 tablet (15 mg total) by mouth 2 (two) times daily. 60 tablet 0    moxifloxacin (AVELOX) 400 mg tablet Take 1 tablet (400 mg total) by mouth once daily. 7 tablet 0    ondansetron (ZOFRAN, AS HYDROCHLORIDE,) 4 MG tablet Take 1 tablet (4 mg total) by mouth every 8 (eight) hours as needed for Nausea. 60 tablet 1    oxycodone-acetaminophen (PERCOCET) 5-325 mg per tablet Take 1 tablet by mouth every 4 (four) hours as needed for Pain. 90 tablet 0    tiotropium (SPIRIVA) 18 mcg inhalation capsule Inhale 1 capsule (18 mcg total) into the lungs once daily. 30 capsule 1     No current facility-administered medications for this visit.        Review of Systems   Constitutional: Positive for appetite change and unexpected weight change. Negative for chills, fatigue and fever.   HENT: Negative for dental problem, sinus pressure and sneezing.    Eyes: Negative for visual disturbance.   Respiratory: Positive for cough and shortness of breath. Negative for choking and chest tightness.    Cardiovascular: Positive for chest pain. Negative for leg swelling.   Gastrointestinal: Negative for abdominal pain, blood in stool, constipation, diarrhea and nausea.   Genitourinary: Negative for difficulty urinating and dysuria.   Musculoskeletal: Negative for arthralgias and back pain.   Skin: Negative for rash and wound.   Neurological: Negative for dizziness, light-headedness and headaches.   Hematological: Negative for adenopathy. Does not  bruise/bleed easily.   Psychiatric/Behavioral: Negative for sleep disturbance. The patient is not nervous/anxious.        ECOG Performance Status:   ECOG SCORE           Objective:      Vitals:   Vitals:    09/11/17 0919   BP: 110/82   BP Location: Right arm   Patient Position: Sitting   BP Method: Medium (Automatic)   Pulse: 107   Resp: 18   Temp: 98.2 °F (36.8 °C)   TempSrc: Oral   SpO2: 99%   Weight: 53.9 kg (118 lb 13.3 oz)     BMI: Body mass index is 21.73 kg/m².    Physical Exam   Constitutional: He is oriented to person, place, and time. He appears well-developed and well-nourished.   HENT:   Head: Normocephalic and atraumatic.   Eyes: Pupils are equal, round, and reactive to light.   Neck: Normal range of motion. Neck supple.   Cardiovascular: Normal rate and regular rhythm.    Pulmonary/Chest: Effort normal and breath sounds normal. No respiratory distress.   Abdominal: Soft. He exhibits no distension. There is no tenderness.   Musculoskeletal: He exhibits no edema or tenderness.   Lymphadenopathy:     He has no cervical adenopathy.   Neurological: He is alert and oriented to person, place, and time. No cranial nerve deficit.   Skin: Skin is warm and dry.   Psychiatric: He has a normal mood and affect. His behavior is normal.       Laboratory Data:  Lab Visit on 09/11/2017   Component Date Value Ref Range Status    WBC 09/11/2017 7.87  3.90 - 12.70 K/uL Final    RBC 09/11/2017 3.44* 4.60 - 6.20 M/uL Final    Hemoglobin 09/11/2017 10.1* 14.0 - 18.0 g/dL Final    Hematocrit 09/11/2017 30.7* 40.0 - 54.0 % Final    MCV 09/11/2017 89  82 - 98 fL Final    MCH 09/11/2017 29.4  27.0 - 31.0 pg Final    MCHC 09/11/2017 32.9  32.0 - 36.0 g/dL Final    RDW 09/11/2017 14.1  11.5 - 14.5 % Final    Platelets 09/11/2017 413* 150 - 350 K/uL Final    MPV 09/11/2017 8.9* 9.2 - 12.9 fL Final    Gran # 09/11/2017 4.8  1.8 - 7.7 K/uL Final    Lymph # 09/11/2017 1.0  1.0 - 4.8 K/uL Final    Mono # 09/11/2017 0.9  0.3  - 1.0 K/uL Final    Eos # 09/11/2017 1.1* 0.0 - 0.5 K/uL Final    Baso # 09/11/2017 0.02  0.00 - 0.20 K/uL Final    Gran% 09/11/2017 60.6  38.0 - 73.0 % Final    Lymph% 09/11/2017 13.2* 18.0 - 48.0 % Final    Mono% 09/11/2017 11.7  4.0 - 15.0 % Final    Eosinophil% 09/11/2017 14.1* 0.0 - 8.0 % Final    Basophil% 09/11/2017 0.3  0.0 - 1.9 % Final    Differential Method 09/11/2017 Automated   Final         Imaging:    CT 7/19/17  CT CHEST, ABDOMEN, and, PELVIS  with IV contrast    Protocol:  Axial images of the chest, abdomen, and pelvis were acquired  after the use of 75 cc [id number] IV contrast.  Coronal and sagittal reconstructions were also obtained    HISTORY:  67 year old M with malignant neoplasm of right bronchus.    COMPARISON: CT outside hospital 03/08/2017 and 11/29/2016     FINDINGS:     Aorta: Normal in course and caliber, without significant atherosclerotic plaque. There are three branching vessels at the arch.       Heart: Normal in size.  Small pericardial effusion. There is calcification of the aortic valve.  There is calcific coronary atherosclerosis.    Anna Marie/Mediastinum: No significant lymphadenopathy     Lungs: There is evidence of a centrally necrotic mass within the right upper lobe, measuring 5.6 cm in its greatest diameter (axial series 2 image 17), compared to 5.8 cm on most recent prior imaging.  There is circumferential pleural thickening surrounding the right upper lobe, with evidence of a small volume of right-sided pleural fluid.  There is surrounding consolidation and groundglass opacities within the right upper lobe, middle lobe and right lower lobe, possibly reflecting changes of radiation therapy.  There is narrowing of the right upper lobe bronchus and vessels.    There has been interval enlargement of mass within the medial basal segment of the right lower lobe, measuring 2.8 cm (axial series 2 image 34), compared to 1.7 cm previously.  This mass has a decreased attenuation  centrally, possibly reflecting presence of necrosis.        There is stable   fibrocalcific scar within the left upper lobe anteriorly.  There are no new focal masses, consolidations or nodules.  There are emphysematous changes of the lungs bilaterally.  There are left-sided apical blebs.     Liver: Normal in size and attenuation, with no focal hepatic lesions. There is interposition in of the colon along the posterior-lateral aspect of the liver.      Gallbladder: There are multiple gallstones, within a nondistended gallbladder.  No evidence of cholecystitis.     Bile Ducts: No evidence of dilated ducts.     Pancreas: There are multiple dystrophic calcifications within the head and uncinate process of the pancreas, suggestive of chronic pancreatitis.      Spleen: Unremarkable.     Adrenals: Unremarkable.     Kidneys/ Ureters: Normal in size and location. Normal concentration and excretion of contrast. No hydronephrosis or nephrolithiasis. No ureteral dilatation.     Bladder: No evidence of wall thickening.     Reproductive organs: Unremarkable.     GI Tract/Mesentery: No evidence of bowel obstruction or inflammation.     Peritoneal Space: There is a small volume of ascites within the pelvis. No free air.     Retroperitoneum:  No significant adenopathy.      Abdominal wall:  There is a small right sided inguinal hernia, containing a loop of small bowel.  No inflammatory changes of the bowel within this territory.      Vasculature: Significant calcific atherosclerosis of the aorta, without aneurysmal dilatation.     Bones: There are multiple anterior rib fractures, 2 of which appear chronic and nonhealed bilaterally.  There is also a nonhealed sternal fracture, which is minimally displaced.  These findings may be secondary to prior trauma or chest compressions.  Surgical changes from placement of intramedullary vesna and screw within the right femur, for a fracture Age-appropriate degenerative changes are noted.    Impression        2 right lung masses, consistent with malignancy.  The right upper lobe mass appears stable in size, and there has been interval growth of the right lower lobe mass.    Consolidation and groundglass opacities surrounding the right upper lobe mass, possibly reflecting changes of radiation therapy.  Note is made of stenosis involving the right upper lobe bronchi and vasculature.    Pleural thickening surrounding the right upper lobe, with a small pleural effusion.    Right inguinal hernia containing a loop of small bowel.    Remote non-healing and healed fractures of multiple anterior ribs bilaterally and the sternum, indicating prior trauma.    Additional findings include:  -Cholelithiasis, without cholecystitis  -Changes of chronic pancreatitis  -Intramedullary vesna and screw within the right femur    RECIST SUMMARY: Date of prior examination for comparison  outside CT 3/8/17    Lesion 1: Right upper lobe 5.6 cm Series 2 Image 17 Prior measurement 5.8 cm  Lesion 2: Right lower lobe 2.8 cm Series 2 Image 34 Prior measurement 1.7 cm                   Assessment:       1. Recurrent non-small cell lung cancer    2. Exam for clinical trial    3. Chemotherapy follow-up examination    4. Cancer associated pain    5. Centrilobular emphysema           Plan:     Mr. Salazar has begun on treatment with ipilimumab and nivolumab on clinical trial.  He is tolerating treatment well.  Labs are appropriate to continue treatment.  Discontinue steroids at this point.  Follow-up with me per protocol.    Gomez Garsia DO, FACP  Hematology & Oncology  [facility] [place] [address]  ph. [phone]  Fax. [phone]    25 minutes were spent in coordination of patient's care, record review and counseling.  More than 50% of the time was face-to-face.

## 2017-09-11 NOTE — PROGRESS NOTES
"Protocol:  Lung MAP  Sub study:  I  Arm 1- Nivolumab/Ipilumumab  Sub Investigator: Gomez Garsia DO  Treating Physician: Gomez Garsia DO  Pt Initials: KEYLA RAMIRES  Patient ID: [id number]  IRB#: [id number]    September 11, 2017    Cycle 3 Nivolumab    Patient seen in clinic today for cycle 3 of Nivolumab. Patient is AAOx 3 and states willingness to continue participation in study. PRO and EQ-5D questionnaires completed by patient. His current complaints include cough, tickle in his throat, shortness of breath on exertion, and weight loss. He has started Mucinex DM to see if it will help with cough and that feeling that phelgm is caught in his throat. He does have a productive cough without fever. He was seen in ED on 9/4/2017 for COPD exacerbation. He was prescribed Flovent inhaler. Dr. Garsia has put in a consult for patient to see pulmonology for management of his COPD. Patient states he is tired, because he has not been sleeping well. He states that he has been having a hard time getting comfortable in bed. His activity level is baseline. Patient states that his pain is well managed on Percocet. He does not take the morphine any more because " it doesn't help." He no longer has any complaints of constipation since starting stool softeners daily. Patient states that since he stopped the steroids he was taking his appetite has been poor. He has lost approximately 4.6 lbs since his last visit. Dr. Garsia changed patient's periactin to marinol to stimulate his appetite. Labs reviewed and assessment completed by Dr. Garsia. Patient is good to proceed with cycle 3 of Nivolumab. Discussed s/s of infusion related reaction. Instructed to notify clinical research nurse and physician if he has to seek medical attention prior to our next visit. Instructed to call physician and myself for any questions or concerns. Phone numbers and cycle 4 appointments for labs, CT scans, MD appt, and chemo given to patient and wife. " Per Dr. Garsia EKG, ECHO, and cardiac labs do not need to be repeated. He just wanted a baseline and they were not clinically significant. Patient's symptoms are related to his lung disease.    Weight has not changed by 10% therefore Nivolumab dose will remain 162 mg.    Weight- 53.9 kg  Height- 157.5m  BSA- 1.57 m2  BP- 110/82  HR- 107  RR- 18  T- 98.2F  O2- 99%      AEs     Anemia Grade 1- Improved. Chronic NCS  Anorexia Grade 2- Appetite has decreased per patient. Marinol prescribed   Cough Grade 2- Chronic cough. Patient states that he has a constant tickling in his throat.  Ongoing  Dyspnea Grade 2- No changes still grade 2. Relieved at rest. Patient has been started on Flovent  Fatigue Grade 1- Relieved with rest per patient.  Generalized muscle weakness Grade 1- weakness perceived by patient and not evident on exam  Headache Grade 1- Resolved  Hyponatremia Grade 1- Ongoing and no interventions taken. NCS  Non cardiac chest pain Grade 1- Patient has history of cracked ribs and sternum. Occasional pain improves with 2 percocet  Pain Grade 1-  Pain to flank. Patient has history of cracked ribs and sternum. Controlled with prescription pain medications  Paresthesia Grade 1- Occasional tingling and cold sensation to fingertips. Does not interfere with ADLs.   Serum amylase elevated Grade 1- History of pancreatitis.  Not clinically significant per Dr. Garsia. Per protocol not assessed until cycle 4  Sinus tachycardia Grade 1- Asymptomatic.  Weight loss Grade 1- 5 to <10% from baseline; intervention not indicated. Remains a grade 1. Marinol prescribed  Wheezing Grade 2- Relieved by nebulizers and inhalers. Occasional no changes reported.   Constipation Grade 1- Resolved.

## 2017-09-11 NOTE — PLAN OF CARE
Problem: Patient Care Overview  Goal: Plan of Care Review  Outcome: Ongoing (interventions implemented as appropriate)  Pt tolerated Opdivo IV infusion well without complications. PIV removed. AVS printed and given to patient. VSS; NAD. Discharging unassisted at this time.

## 2017-09-11 NOTE — PLAN OF CARE
Problem: Patient Care Overview  Goal: Plan of Care Review  Outcome: Ongoing (interventions implemented as appropriate)  Pt arrived to infusion center for Opdivo IV infusion. Denies pain; c/o SOB and productive cough. Pt's appetite decreased, pt to try marinol. PIV started to left forearm. [place] and snacks offered. VSS; NAD. Will monitor.

## 2017-09-13 ENCOUNTER — TELEPHONE (OUTPATIENT)
Dept: HEMATOLOGY/ONCOLOGY | Facility: CLINIC | Age: 67
End: 2017-09-13

## 2017-09-13 NOTE — TELEPHONE ENCOUNTER
"Spoke to rep "Vandana" with [employer] and she states the last page which is usually signed just has Dr. Garsia's printed name.There is not the words "electronic signature" next to his name nor any kind of signature and there needs to be something that shows either his electronic signature or his actual signature and then they can release the nebulizer.    Will get this from Dr. Garsia and let [facility] know. .   "

## 2017-09-13 NOTE — TELEPHONE ENCOUNTER
----- Message from Briseida Lassiter sent at 9/13/2017 11:11 AM CDT -----  Contact: Vandana from [employer] can be reached at [phone]  Vandana is calling about signature on last page need for the the nebulizer for pt.       Fax [phone]

## 2017-09-15 ENCOUNTER — DOCUMENTATION ONLY (OUTPATIENT)
Dept: HEMATOLOGY/ONCOLOGY | Facility: CLINIC | Age: 67
End: 2017-09-15

## 2017-09-15 NOTE — PROGRESS NOTES
Spoke with [employer] yesterday and they ar processing the order for the nebulizer. I did provide them with the signed progress note that they requested. Patient and his wife came today to pickup 1 case of vanilla boost ( ordered through patient assistance). I inquired about he nebulizer and she stated they had not received it yet.

## 2017-09-22 ENCOUNTER — HOSPITAL ENCOUNTER (OUTPATIENT)
Dept: RADIOLOGY | Facility: HOSPITAL | Age: 67
Discharge: HOME | End: 2017-09-22
Attending: INTERNAL MEDICINE
Payer: MEDICARE

## 2017-09-22 DIAGNOSIS — C34.90 RECURRENT NON-SMALL CELL LUNG CANCER: ICD-10-CM

## 2017-09-22 DIAGNOSIS — Z00.6 EXAMINATION OF PARTICIPANT IN CLINICAL TRIAL: ICD-10-CM

## 2017-09-22 PROCEDURE — 71260 CT THORAX DX C+: CPT | Mod: TC,Q1

## 2017-09-22 PROCEDURE — 74177 CT ABD & PELVIS W/CONTRAST: CPT | Mod: 26,Q1,, | Performed by: RADIOLOGY

## 2017-09-22 PROCEDURE — 74177 CT ABD & PELVIS W/CONTRAST: CPT | Mod: TC

## 2017-09-22 PROCEDURE — 25500020 PHARM REV CODE 255: Performed by: INTERNAL MEDICINE

## 2017-09-22 PROCEDURE — 71260 CT THORAX DX C+: CPT | Mod: 26,Q1,, | Performed by: RADIOLOGY

## 2017-09-22 RX ADMIN — IOHEXOL 75 ML: 350 INJECTION, SOLUTION INTRAVENOUS at 10:09

## 2017-09-22 RX ADMIN — IOHEXOL 15 ML: 350 INJECTION, SOLUTION INTRAVENOUS at 10:09

## 2017-09-22 RX ADMIN — IOHEXOL 15 ML: 350 INJECTION, SOLUTION INTRAVENOUS at 09:09

## 2017-09-25 ENCOUNTER — OFFICE VISIT (OUTPATIENT)
Dept: HEMATOLOGY/ONCOLOGY | Facility: CLINIC | Age: 67
End: 2017-09-25
Payer: MEDICARE

## 2017-09-25 ENCOUNTER — INFUSION (OUTPATIENT)
Dept: INFUSION THERAPY | Facility: HOSPITAL | Age: 67
End: 2017-09-25
Attending: INTERNAL MEDICINE
Payer: MEDICARE

## 2017-09-25 ENCOUNTER — RESEARCH ENCOUNTER (OUTPATIENT)
Dept: RESEARCH | Facility: HOSPITAL | Age: 67
End: 2017-09-25

## 2017-09-25 VITALS
HEART RATE: 95 BPM | RESPIRATION RATE: 18 BRPM | DIASTOLIC BLOOD PRESSURE: 75 MMHG | TEMPERATURE: 98 F | OXYGEN SATURATION: 98 % | SYSTOLIC BLOOD PRESSURE: 122 MMHG

## 2017-09-25 VITALS
WEIGHT: 115.31 LBS | HEART RATE: 90 BPM | SYSTOLIC BLOOD PRESSURE: 94 MMHG | BODY MASS INDEX: 21.22 KG/M2 | TEMPERATURE: 99 F | RESPIRATION RATE: 18 BRPM | HEIGHT: 62 IN | DIASTOLIC BLOOD PRESSURE: 70 MMHG

## 2017-09-25 DIAGNOSIS — E46 PROTEIN CALORIE MALNUTRITION: ICD-10-CM

## 2017-09-25 DIAGNOSIS — Z00.6 EXAM FOR CLINICAL TRIAL: ICD-10-CM

## 2017-09-25 DIAGNOSIS — C34.91 PRIMARY CANCER OF RIGHT LUNG: Primary | ICD-10-CM

## 2017-09-25 DIAGNOSIS — C34.90 RECURRENT NON-SMALL CELL LUNG CANCER: Primary | ICD-10-CM

## 2017-09-25 DIAGNOSIS — G89.3 CANCER ASSOCIATED PAIN: ICD-10-CM

## 2017-09-25 DIAGNOSIS — E46 PROTEIN MALNUTRITION: Primary | ICD-10-CM

## 2017-09-25 DIAGNOSIS — J43.2 CENTRILOBULAR EMPHYSEMA: ICD-10-CM

## 2017-09-25 PROBLEM — Z09 CHEMOTHERAPY FOLLOW-UP EXAMINATION: Status: RESOLVED | Noted: 2017-06-21 | Resolved: 2017-09-25

## 2017-09-25 PROCEDURE — 96413 CHEMO IV INFUSION 1 HR: CPT

## 2017-09-25 PROCEDURE — 99215 OFFICE O/P EST HI 40 MIN: CPT | Mod: Q1,S$GLB,, | Performed by: INTERNAL MEDICINE

## 2017-09-25 PROCEDURE — 99999 PR PBB SHADOW E&M-EST. PATIENT-LVL IV: CPT | Mod: PBBFAC,,, | Performed by: INTERNAL MEDICINE

## 2017-09-25 PROCEDURE — 96417 CHEMO IV INFUS EACH ADDL SEQ: CPT

## 2017-09-25 PROCEDURE — 25000003 PHARM REV CODE 250: Performed by: INTERNAL MEDICINE

## 2017-09-25 RX ORDER — SODIUM CHLORIDE 0.9 % (FLUSH) 0.9 %
10 SYRINGE (ML) INJECTION
Status: CANCELLED | OUTPATIENT
Start: 2017-09-25

## 2017-09-25 RX ORDER — OXYCODONE AND ACETAMINOPHEN 10; 325 MG/1; MG/1
1 TABLET ORAL EVERY 4 HOURS PRN
Qty: 90 TABLET | Refills: 0 | Status: SHIPPED | OUTPATIENT
Start: 2017-09-25 | End: 2017-10-23 | Stop reason: SDUPTHER

## 2017-09-25 RX ORDER — HEPARIN 100 UNIT/ML
500 SYRINGE INTRAVENOUS
Status: CANCELLED | OUTPATIENT
Start: 2017-09-25

## 2017-09-25 RX ADMIN — SODIUM CHLORIDE: 0.9 INJECTION, SOLUTION INTRAVENOUS at 11:09

## 2017-09-25 NOTE — PROGRESS NOTES
PATIENT: Karsten Salazar  MRN: [id number]  DATE: 9/26/2017      Diagnosis:   1. Recurrent non-small cell lung cancer    2. Cancer associated pain    3. Centrilobular emphysema    4. Exam for clinical trial    5. Protein calorie malnutrition        Chief Complaint: Lung Cancer      Oncologic History:      Oncologic History Non-small cell lung cancer diagnosed 6/2016   Recurrent disease to lung 5/2017    Oncologic Treatment Concurrent chemoXRT with weekly carboplatin and paclitaxel, XRT to a dose of 5840 cGy 10/31/2016  Carboplatin/gemcitabine 6/2/2017 - 7/2017 (discontinued due to progression)  Ipilimumab plus nivolumab  clinical trial 8/14/17     Pathology Squamous cell carcinoma, T3, N2, M0 (6/2016), PD-L1 TPS 1%        Subjective:    Interval History: Mr. Salazar is a 67 y.o. male who is seen in follow-up for lung cancer.  He states that he still has some ongoing shortness of breath and chest wall pain despite use of Percocet 5 mg.  He uses morphine only at night.  He has tried Marinol without much success.  He is awaiting pulmonary consultation.  He has no other new complaints.    He has previously seen Dr. Ubaldo Thomason at [facility].  His history dates to 6/2016 when he sought medical attention for shortness of breath and cough.  He was found to have a large lung mass in the right upper lobe with mediastinal lymphadenopathy.  He underwent subsequent workup, however, symptoms worsened and progressed to him having a cardiac arrest requiring CPR and prolonged ICU stay.  Eventually a biopsy was performed showing squamous cell lung cancer.  This was initially staged as a T3, N2.  He was treated with concurrent chemotherapy and radiation until 10/31/16.  He is an on surveillance since this time.  His insurance changed and is now following and [facility].  Repeat PET/CT had shown locally recurrent disease.  MRI of the brain was negative.  He was started on carboplatin and gemcitabine on 6/2/17.  CT on 7/19/17 and had  indicated progressive disease.  He was started on ipilimumab and nivolumab on a clinical trial on 8/14/17.  CT in 9/2017 had revealed stable disease.      Past Medical History:   Past Medical History:   Diagnosis Date    Cancer associated pain 8/28/2017    Centrilobular emphysema 9/11/2017    Exam for clinical trial 8/9/2017    History of cardiac arrest     Hyperlipidemia     Hypertension     Mediastinal lymphadenopathy 5/24/2017    Primary cancer of right lung 4/6/2017    Protein calorie malnutrition 9/11/2017    Recurrent non-small cell lung cancer 5/24/2017       Past Surgical HIstory:   Past Surgical History:   Procedure Laterality Date    HERNIA REPAIR         Family History:   Family History   Problem Relation Age of Onset    Cancer Mother        Social History:  reports that he has quit smoking. His smoking use included Cigarettes. He has a 72.00 pack-year smoking history. He has never used smokeless tobacco. He reports that he drinks about 10.2 oz of alcohol per week . He reports that he does not use drugs.    Allergies:  Review of patient's allergies indicates:  No Known Allergies    Medications:  Current Outpatient Prescriptions   Medication Sig Dispense Refill    albuterol 90 mcg/actuation inhaler Inhale 2 puffs into the lungs every 6 (six) hours as needed for Wheezing. 1 Inhaler 0    albuterol-ipratropium 2.5mg-0.5mg/3mL (DUO-NEB) 0.5 mg-3 mg(2.5 mg base)/3 mL nebulizer solution Take 3 mLs by nebulization every 4 (four) hours as needed for Wheezing. 20 vial 0    aspirin (ECOTRIN) 81 MG EC tablet Take 81 mg by mouth.      atorvastatin (LIPITOR) 40 MG tablet Take 40 mg by mouth.      benzonatate (TESSALON) 100 MG capsule Take 100 mg by mouth.      carvedilol (COREG) 3.125 MG tablet TAKE 1 TABLET BY MOUTH TWICE DAILY 180 tablet 0    carvedilol (COREG) 3.125 MG tablet TAKE 1 TABLET BY MOUTH TWICE DAILY 60 tablet 0    cyproheptadine (PERIACTIN) 4 mg tablet Take 1 tablet (4 mg total) by  mouth 3 (three) times daily. 90 tablet 2    dronabinol (MARINOL) 5 MG capsule Take 1 capsule (5 mg total) by mouth 2 (two) times daily before meals. 60 capsule 0    fluticasone (FLOVENT HFA) 110 mcg/actuation inhaler Inhale 2 puffs into the lungs 2 (two) times daily. Controller 12 g 0    guaifenesin-codeine 100-10 mg/5 ml (TUSSI-ORGANIDIN NR)  mg/5 mL syrup Take 5 mLs by mouth 3 (three) times daily as needed for Cough.      lisinopril (PRINIVIL,ZESTRIL) 2.5 MG tablet Take 2.5 mg by mouth.      morphine (MS CONTIN) 15 MG 12 hr tablet Take 1 tablet (15 mg total) by mouth 2 (two) times daily. 60 tablet 0    moxifloxacin (AVELOX) 400 mg tablet Take 1 tablet (400 mg total) by mouth once daily. 7 tablet 0    ondansetron (ZOFRAN, AS HYDROCHLORIDE,) 4 MG tablet Take 1 tablet (4 mg total) by mouth every 8 (eight) hours as needed for Nausea. 60 tablet 1    oxycodone-acetaminophen (PERCOCET)  mg per tablet Take 1 tablet by mouth every 4 (four) hours as needed for Pain. 90 tablet 0    tiotropium (SPIRIVA) 18 mcg inhalation capsule Inhale 1 capsule (18 mcg total) into the lungs once daily. 30 capsule 1     No current facility-administered medications for this visit.        Review of Systems   Constitutional: Positive for appetite change and unexpected weight change. Negative for chills, fatigue and fever.   HENT: Negative for dental problem, sinus pressure and sneezing.    Eyes: Negative for visual disturbance.   Respiratory: Positive for cough and shortness of breath. Negative for choking and chest tightness.    Cardiovascular: Positive for chest pain. Negative for leg swelling.   Gastrointestinal: Negative for abdominal pain, blood in stool, constipation, diarrhea and nausea.   Genitourinary: Negative for difficulty urinating and dysuria.   Musculoskeletal: Negative for arthralgias and back pain.   Skin: Negative for rash and wound.   Neurological: Negative for dizziness, light-headedness and headaches.  "  Hematological: Negative for adenopathy. Does not bruise/bleed easily.   Psychiatric/Behavioral: Negative for sleep disturbance. The patient is not nervous/anxious.        ECOG Performance Status:   ECOG SCORE    2 - Capable of all selfcare but unable to carry out any work activities, active > 50% of hours         Objective:      Vitals:   Vitals:    09/25/17 1058   BP: 94/70   BP Location: Right arm   Patient Position: Sitting   BP Method: Small (Automatic)   Pulse: 90   Resp: 18   Temp: 98.7 °F (37.1 °C)   TempSrc: Oral   Weight: 52.3 kg (115 lb 4.8 oz)   Height: 5' 2" (1.575 m)     BMI: Body mass index is 21.09 kg/m².    Physical Exam   Constitutional: He is oriented to person, place, and time. He appears well-developed and well-nourished.   HENT:   Head: Normocephalic and atraumatic.   Eyes: Pupils are equal, round, and reactive to light.   Neck: Normal range of motion. Neck supple.   Cardiovascular: Normal rate and regular rhythm.    Pulmonary/Chest: Effort normal and breath sounds normal. No respiratory distress.   Abdominal: Soft. He exhibits no distension. There is no tenderness.   Musculoskeletal: He exhibits no edema or tenderness.   Lymphadenopathy:     He has no cervical adenopathy.   Neurological: He is alert and oriented to person, place, and time. No cranial nerve deficit.   Skin: Skin is warm and dry.   Psychiatric: He has a normal mood and affect. His behavior is normal.       Laboratory Data:  Lab Visit on 09/25/2017   Component Date Value Ref Range Status    Sodium 09/25/2017 127* 136 - 145 mmol/L Final    Potassium 09/25/2017 4.5  3.5 - 5.1 mmol/L Final    Chloride 09/25/2017 92* 95 - 110 mmol/L Final    CO2 09/25/2017 29  23 - 29 mmol/L Final    Glucose 09/25/2017 111* 70 - 110 mg/dL Final    BUN, Bld 09/25/2017 10  8 - 23 mg/dL Final    Creatinine 09/25/2017 0.9  0.5 - 1.4 mg/dL Final    Calcium 09/25/2017 9.2  8.7 - 10.5 mg/dL Final    Total Protein 09/25/2017 8.1  6.0 - 8.4 g/dL " Final    Albumin 09/25/2017 3.0* 3.5 - 5.2 g/dL Final    Total Bilirubin 09/25/2017 0.3  0.1 - 1.0 mg/dL Final    Comment: For infants and newborns, interpretation of results should be based  on gestational age, weight and in agreement with clinical  observations.  Premature Infant recommended reference ranges:  Up to 24 hours.............<8.0 mg/dL  Up to 48 hours............<12.0 mg/dL  3-5 days..................<15.0 mg/dL  6-29 days.................<15.0 mg/dL      Alkaline Phosphatase 09/25/2017 90  55 - 135 U/L Final    AST 09/25/2017 13  10 - 40 U/L Final    ALT 09/25/2017 11  10 - 44 U/L Final    Anion Gap 09/25/2017 6* 8 - 16 mmol/L Final    eGFR if African American 09/25/2017 >60.0  >60 mL/min/1.73 m^2 Final    eGFR if non African American 09/25/2017 >60.0  >60 mL/min/1.73 m^2 Final    Comment: Calculation used to obtain the estimated glomerular filtration  rate (eGFR) is the CKD-EPI equation. Since race is unknown   in our information system, the eGFR values for   -American and Non--American patients are given   for each creatinine result.      Magnesium 09/25/2017 1.5* 1.6 - 2.6 mg/dL Final    WBC 09/25/2017 8.60  3.90 - 12.70 K/uL Final    RBC 09/25/2017 3.40* 4.60 - 6.20 M/uL Final    Hemoglobin 09/25/2017 9.5* 14.0 - 18.0 g/dL Final    Hematocrit 09/25/2017 28.0* 40.0 - 54.0 % Final    MCV 09/25/2017 82  82 - 98 fL Final    MCH 09/25/2017 27.9  27.0 - 31.0 pg Final    MCHC 09/25/2017 33.9  32.0 - 36.0 g/dL Final    RDW 09/25/2017 14.2  11.5 - 14.5 % Final    Platelets 09/25/2017 475* 150 - 350 K/uL Final    MPV 09/25/2017 9.2  9.2 - 12.9 fL Final    Gran # 09/25/2017 5.2  1.8 - 7.7 K/uL Final    Lymph # 09/25/2017 1.5  1.0 - 4.8 K/uL Final    Mono # 09/25/2017 1.3* 0.3 - 1.0 K/uL Final    Eos # 09/25/2017 0.6* 0.0 - 0.5 K/uL Final    Baso # 09/25/2017 0.01  0.00 - 0.20 K/uL Final    Gran% 09/25/2017 60.6  38.0 - 73.0 % Final    Lymph% 09/25/2017 17.7* 18.0 -  48.0 % Final    Mono% 09/25/2017 14.5  4.0 - 15.0 % Final    Eosinophil% 09/25/2017 6.9  0.0 - 8.0 % Final    Basophil% 09/25/2017 0.1  0.0 - 1.9 % Final    Differential Method 09/25/2017 Automated   Final    Lipase 09/25/2017 20  4 - 60 U/L Final    Amylase 09/25/2017 70  20 - 110 U/L Final    TSH 09/25/2017 1.709  0.400 - 4.000 uIU/mL Final    T3, Free 09/25/2017 2.1* 2.3 - 4.2 pg/mL Final    Free T4 09/25/2017 1.29  0.71 - 1.51 ng/dL Final         Imaging:    CT 9/22/17  CT CHEST, ABDOMEN, and, PELVIS  with IV contrast    Protocol:  Axial images of the chest, abdomen, and pelvis were acquired  after the use of 75 cc Camr535 IV contrast.  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     COMPARISON: CT 07/19/2017     FINDINGS:    Thoracic chest wall soft tissues: No significant abnormality.     Aorta: Normal in course and caliber, with mild atherosclerotic plaque. There are three branching vessels at the arch. There is moderate coronary artery atherosclerotic calcification.      Heart: Normal in size.  There is a stable small pericardial effusion.     Anna Marie/Mediastinum: No significant lymphadenopathy     Lungs: There is redemonstration of RIGHT pleural thickening and trace RIGHT pleural fluid.  There is a soft tissue mass in the anterior RIGHT upper lobe measuring 4.7 cm maximally (axial series 2 image 18) which abuts the RIGHT paramediastinal border and SVC without focal narrowing.  There is associated volume loss in the RIGHT upper lobe and distortion with patent air bronchograms.  Soft tissue mass at the RIGHT lower lobe measures 3.8 cm maximally with central low attenuation suggesting necrosis (axial series 2 image 35).  At the RIGHT diaphragmatic marichuy there is a 3.4 cm soft tissue mass with central heterogenous attenuation which abuts the RIGHT kidney and RIGHT neural foramen at the T11-T12 level (axial  series 2 image 50, previously 2.1 cm). Right lung also has patchy density in the middle and  upper zones suggesting radiation therapy change. The anterior LEFT upper lobe demonstrates a fibrocalcific scar and small bleb.  LEFT apical bleb is also noted.  There is moderate emphysema.    Liver: Normal in size and attenuation, with no focal hepatic lesions.       Gallbladder: There is cholelithiasis.     Bile Ducts: No evidence of dilated ducts.     Pancreas: No mass or peripancreatic fat stranding. Chronic pancreatic head calcifications are again noted likely related to chronic pancreatitis.     Spleen: Normal.     Adrenals: Normal.     GI Tract/Mesentery: No evidence of bowel obstruction or inflammation.     Kidneys/ Ureters: Normal in size and location.No hydronephrosis or nephrolithiasis. No ureteral dilatation.     Bladder: There is diffuse thickening of the bladder walls.     Reproductive organs: Normal.     Retroperitoneum:  No significant adenopathy.      Peritoneal Space: Mild ascites. No free air.     Abdominal wall:  There is a RIGHT inguinal hernia containing a portion of the anterior bladder and a loop of bowel.      Vasculature: There is severe aortobiiliac atherosclerotic calcification with   significant stenosis at the origin of both common iliac arteries and threadlike flow through the RIGHT common femoral artery.     Bones: No acute fracture.  There are healing changes of a  midsternal fracture and bilateral anterior rib fractures, similar to prior, incompletely healed. No osseous lytic or blastic lesion.  Postoperative changes of a intramedullary vesna and screw in the RIGHT femur are again noted.   Impression          Interval enlargement of masses at the right lower lobe and right diaphragmatic marichuy compatible with malignancy.    Severe aortobiiliac atherosclerotic plaque with likely hemodynamically significant stenosis at the origin of both common iliac arteries and   the right common  femoral artery.    Diffuse bladder wall thickening which can be seen in chronic bladder outlet obstruction or cystitis.  Correlate with urinalysis.  Note that a portion of the anterolateral bladder margin herniates into a right inguinal hernia which also contains a loop of small bowel without obstruction.    Small pericardial effusion.    Multiple incompletely healed fractues of the anterior chest wall.     Cholelithiasis. Pancreatic calcifications consistent with chronic calcific pancreatitis.    Additional stable findings as detailed above.    RECIST SUMMARY: Date of prior examination for comparison 07/19/2017  Right upper lobe mass measures 4.7 cm maximally (axial series 2 image 18), previously 5.6 cm  Right lower lobe mass measures 3.8 cm maximally (axial series 2/75), previously 2.8 cm  Diaphragmatic marichuy mass measures 3.4 cm maximally (axial series 2 image 50), previously 2.1 cm    This report has been flagged in the [facility] medical record.               Assessment:       1. Recurrent non-small cell lung cancer    2. Cancer associated pain    3. Centrilobular emphysema    4. Exam for clinical trial    5. Protein calorie malnutrition           Plan:     Mr. Salazar scans reveal a mixed response to therapy.  Overall he has approximately a 13% increase in the volume of his disease based on RECIST and therefore stable disease.  He will proceed on with therapy.  We have had a long discussion concerning pain control and will increase Percocet to 10 mg tablets and also he can start taking morphine twice daily.  He is awaiting referral to pulmonary.  I have asked him to add back Periactin.  Multiple questions were answered and he is agreeable with this plan.    Gomez Garsia DO, FACP  Hematology & Oncology  [address] [place] [address]  ph. [phone]  Fax. [phone]

## 2017-09-25 NOTE — Clinical Note
Chemotherapy today Follow-up with me per protocol He was supposed to see pulmonary and I do not see where this was scheduled.

## 2017-09-25 NOTE — PLAN OF CARE
Problem: Patient Care Overview  Goal: Plan of Care Review  1453-Patient tolerated treatment well. Discharged without complaints or S/S of adverse event. AVS given.  Instructed to call provider for any questions or concerns.

## 2017-09-25 NOTE — PROGRESS NOTES
Protocol:  Lung MAP  Sub study:  I  Arm 1- Nivolumab/Ipilumumab  Sub Investigator: Gomez Garsia DO  Treating Physician: Gomez Garsia DO  Pt Initials: KEYLA RAMIRES  Patient ID: [id number]  IRB#: [id number]    September 25, 2017    Cycle 4 Nivolumab/Ipilumumab    Patient seen in clinic today for cycle 4 of Nivolumab. Patient is AAOx 3 and states willingness to continue participation in study. PRO and EQ-5D questionnaires completed by patient. His current complaints include cough, tickle in his throat, shortness of breath on exertion, pain and weight loss.  Dr. Garsia has put in a consult for patient to see pulmonology last visit for management of his COPD.  made aware that this still needs to be scheduled. Patient states that he has been having a hard time getting comfortable in bed. Patient states that his pain is was not relieved by 2 Percocet every 4-6 hours. He restarted his MS Contin at night. Dr. Garsia instructed patient to start taking his MS Contin twice a day and ok to use Percocet  mg every 4 hours PRN.  He no longer has any complaints of constipation since starting stool softeners daily, however we discussed adding in laxatives as needed. Patient's appetite has been poor. He has lost approximately 3.5 lbs since his last visit. Patient states that the Marinol has not helped and the Boost makes him nauseated. Dr. Garsia ordered to continue Marinol and add Periactin back in. His activity is baseline. Labs and CT scan reviewed, and assessment completed by Dr. Garsia. CT scan shows stable disease. Spoke with Radiologist regarding diaphragmatic marichuy mass present on 9/22/2017 CT scan that was not mentioned on CT scan on 7/19/2017. Per radiologist this mass was present prior and the report will be addended. Patient is good to proceed with cycle 4 of Nivolumab/Ipilumumab. Discussed s/s of infusion related reaction. Instructed to notify clinical research nurse and physician if he has to seek  medical attention prior to our next visit. Instructed to call physician and myself for any questions or concerns. Phone numbers and cycle 5 appointments for labs,  MD appt, and chemo given to patient and wife. Weight has not changed by 10% therefore Nivolumab dose will remain 162 mg and Ipilumumab with remain 54 mg.     Weight- 52.3 kg  Height- 157.5m  BSA- 1.51 m2  BP- 94/70  HR- 90  RR- 18  T- 98.7F  O2- 98%      AEs     Anemia Grade 2-  Chronic NCS  Anorexia Grade 2- Appetite has decreased per patient. Patient is to continue Marinol and add Periactin back in    Cough Grade 2- Chronic cough. Patient states that he has a constant tickling in his throat.  Ongoing  Dyspnea Grade 2- No changes still grade 2. Relieved at rest. Patient has been started on Flovent  Fatigue Grade 1- Relieved with rest per patient.  Generalized muscle weakness Grade 1- weakness perceived by patient and not evident on exam  Headache Grade 1- Resolved  Hyponatremia Grade 1- Ongoing and no interventions taken. NCS  Non cardiac chest pain Grade 2- Patient has history of cracked ribs and sternum. Occasional pain. Patient is to take MS Contin every 12 hours and Percocet  mg Q 4 hours PRN  Pain Grade 2-  Pain to flank. Patient has history of cracked ribs and sternum. Controlled with prescription pain medications  Paresthesia Grade 1- Occasional tingling and cold sensation to fingertips. Does not interfere with ADLs.   Serum amylase elevated Grade 1- History of pancreatitis.  Not clinically significant per Dr. Garsia. Per protocol not assessed until cycle 4  Sinus tachycardia Grade 1- Resolved this week  Weight loss Grade 1- 5 to <10% from baseline; intervention not indicated. Remains a grade 1. Marinol prescribed and add Periactin back in    Wheezing Grade 2- Relieved by nebulizers and inhalers. Occasional no changes reported.   Constipation Grade 1- Resolved.

## 2017-09-25 NOTE — PLAN OF CARE
Problem: Patient Care Overview  Goal: Individualization & Mutuality  PIV  Warm blankets   Outcome: Ongoing (interventions implemented as appropriate)  1148-Labs , hx, and medications reviewed, patient seen by MD and research team prior to arrival. Assessment completed. Discussed plan of care with patient. Patient in agreement. Chair reclined and warm blanket and snack offered.

## 2017-10-09 ENCOUNTER — INFUSION (OUTPATIENT)
Dept: INFUSION THERAPY | Facility: HOSPITAL | Age: 67
End: 2017-10-09
Attending: INTERNAL MEDICINE
Payer: MEDICARE

## 2017-10-09 ENCOUNTER — OFFICE VISIT (OUTPATIENT)
Dept: HEMATOLOGY/ONCOLOGY | Facility: CLINIC | Age: 67
End: 2017-10-09
Payer: MEDICARE

## 2017-10-09 ENCOUNTER — RESEARCH ENCOUNTER (OUTPATIENT)
Dept: RESEARCH | Facility: HOSPITAL | Age: 67
End: 2017-10-09

## 2017-10-09 ENCOUNTER — LAB VISIT (OUTPATIENT)
Dept: LAB | Facility: HOSPITAL | Age: 67
End: 2017-10-09
Attending: INTERNAL MEDICINE
Payer: MEDICARE

## 2017-10-09 VITALS
TEMPERATURE: 98 F | RESPIRATION RATE: 18 BRPM | HEART RATE: 112 BPM | DIASTOLIC BLOOD PRESSURE: 77 MMHG | SYSTOLIC BLOOD PRESSURE: 126 MMHG

## 2017-10-09 VITALS
OXYGEN SATURATION: 96 % | SYSTOLIC BLOOD PRESSURE: 121 MMHG | HEART RATE: 114 BPM | BODY MASS INDEX: 21.21 KG/M2 | TEMPERATURE: 98 F | WEIGHT: 115.94 LBS | RESPIRATION RATE: 18 BRPM | DIASTOLIC BLOOD PRESSURE: 71 MMHG

## 2017-10-09 DIAGNOSIS — Z00.6 EXAMINATION OF PARTICIPANT IN CLINICAL TRIAL: ICD-10-CM

## 2017-10-09 DIAGNOSIS — C34.91 PRIMARY CANCER OF RIGHT LUNG: Primary | ICD-10-CM

## 2017-10-09 DIAGNOSIS — C34.91 NON-SMALL CELL CANCER OF RIGHT LUNG: Primary | ICD-10-CM

## 2017-10-09 DIAGNOSIS — R59.0 MEDIASTINAL LYMPHADENOPATHY: ICD-10-CM

## 2017-10-09 DIAGNOSIS — Z00.6 EXAM FOR CLINICAL TRIAL: ICD-10-CM

## 2017-10-09 DIAGNOSIS — C34.90 RECURRENT NON-SMALL CELL LUNG CANCER: ICD-10-CM

## 2017-10-09 DIAGNOSIS — C34.90 RECURRENT NON-SMALL CELL LUNG CANCER: Primary | ICD-10-CM

## 2017-10-09 DIAGNOSIS — C34.91 PRIMARY LUNG CANCER, RIGHT: ICD-10-CM

## 2017-10-09 DIAGNOSIS — G89.3 CANCER ASSOCIATED PAIN: ICD-10-CM

## 2017-10-09 LAB
ALBUMIN SERPL BCP-MCNC: 3.2 G/DL
ALP SERPL-CCNC: 99 U/L
ALT SERPL W/O P-5'-P-CCNC: 14 U/L
ANION GAP SERPL CALC-SCNC: 8 MMOL/L
AST SERPL-CCNC: 15 U/L
BASOPHILS # BLD AUTO: 0.03 K/UL
BASOPHILS NFR BLD: 0.4 %
BILIRUB SERPL-MCNC: 0.4 MG/DL
BUN SERPL-MCNC: 10 MG/DL
CALCIUM SERPL-MCNC: 10.6 MG/DL
CHLORIDE SERPL-SCNC: 94 MMOL/L
CO2 SERPL-SCNC: 28 MMOL/L
CREAT SERPL-MCNC: 0.9 MG/DL
DIFFERENTIAL METHOD: ABNORMAL
EOSINOPHIL # BLD AUTO: 0.6 K/UL
EOSINOPHIL NFR BLD: 6.8 %
ERYTHROCYTE [DISTWIDTH] IN BLOOD BY AUTOMATED COUNT: 14.5 %
EST. GFR  (AFRICAN AMERICAN): >60 ML/MIN/1.73 M^2
EST. GFR  (NON AFRICAN AMERICAN): >60 ML/MIN/1.73 M^2
GLUCOSE SERPL-MCNC: 101 MG/DL
HCT VFR BLD AUTO: 33.6 %
HGB BLD-MCNC: 10.8 G/DL
LYMPHOCYTES # BLD AUTO: 2.2 K/UL
LYMPHOCYTES NFR BLD: 25.6 %
MAGNESIUM SERPL-MCNC: 1.7 MG/DL
MCH RBC QN AUTO: 27.9 PG
MCHC RBC AUTO-ENTMCNC: 32.1 G/DL
MCV RBC AUTO: 87 FL
MONOCYTES # BLD AUTO: 1.1 K/UL
MONOCYTES NFR BLD: 12.4 %
NEUTROPHILS # BLD AUTO: 4.6 K/UL
NEUTROPHILS NFR BLD: 54.7 %
PLATELET # BLD AUTO: 517 K/UL
PMV BLD AUTO: 8.4 FL
POTASSIUM SERPL-SCNC: 4.1 MMOL/L
PROT SERPL-MCNC: 9.4 G/DL
RBC # BLD AUTO: 3.87 M/UL
SODIUM SERPL-SCNC: 130 MMOL/L
WBC # BLD AUTO: 8.47 K/UL

## 2017-10-09 PROCEDURE — 36415 COLL VENOUS BLD VENIPUNCTURE: CPT

## 2017-10-09 PROCEDURE — 85025 COMPLETE CBC W/AUTO DIFF WBC: CPT

## 2017-10-09 PROCEDURE — 96361 HYDRATE IV INFUSION ADD-ON: CPT

## 2017-10-09 PROCEDURE — 99214 OFFICE O/P EST MOD 30 MIN: CPT | Mod: Q1,S$GLB,, | Performed by: INTERNAL MEDICINE

## 2017-10-09 PROCEDURE — 25000003 PHARM REV CODE 250: Performed by: INTERNAL MEDICINE

## 2017-10-09 PROCEDURE — 83735 ASSAY OF MAGNESIUM: CPT

## 2017-10-09 PROCEDURE — 80053 COMPREHEN METABOLIC PANEL: CPT

## 2017-10-09 PROCEDURE — 99999 PR PBB SHADOW E&M-EST. PATIENT-LVL III: CPT | Mod: PBBFAC,,, | Performed by: INTERNAL MEDICINE

## 2017-10-09 PROCEDURE — 96413 CHEMO IV INFUSION 1 HR: CPT

## 2017-10-09 RX ORDER — SODIUM CHLORIDE 0.9 % (FLUSH) 0.9 %
10 SYRINGE (ML) INJECTION
Status: CANCELLED | OUTPATIENT
Start: 2017-10-09

## 2017-10-09 RX ORDER — HEPARIN 100 UNIT/ML
500 SYRINGE INTRAVENOUS
Status: CANCELLED | OUTPATIENT
Start: 2017-10-09

## 2017-10-09 RX ORDER — ADHESIVE BANDAGE
30 BANDAGE TOPICAL DAILY PRN
COMMUNITY

## 2017-10-09 RX ADMIN — SODIUM CHLORIDE: 0.9 INJECTION, SOLUTION INTRAVENOUS at 10:10

## 2017-10-09 NOTE — PROGRESS NOTES
"Protocol:  Lung MAP  Sub study:  I  Arm 1- Nivolumab/Ipilumumab  Sub Investigator: Gomez Garsia DO  Treating Physician: Gomez Garsia DO  Pt Initials: KEYLA RAMIRES  Patient ID: [id number]  IRB#: [id number]    October 9, 2017    Cycle 5 Nivolumab    Patient seen in clinic today for cycle 5 of Nivolumab. Patient is AAOx 3 and states willingness to continue participation in study. PRO and EQ-5D questionnaires completed by patient. Patient has had no significant changes in assessment. He states that his cough is a little better, but he still gets "short winded" when he exerts himself. He is scheduled to see pulmonologist tomorrow. His pain is well managed MS Contin twice a day and Percocet  mg every 4 hours PRN.  He no longer has any complaints of constipation since starting stool softeners daily,and he has added in Milk of Magnesia as needed. Patient's appetite remains poor, but his has not lost anymore weight. He is taking Marinol and Periactin as prescribed. He will be seen by dietician today. His activity remains the same since last visit. His ECOG is 2. Labs reviewed and assessment completed by Dr. Garsia. Patient's calcium is slightly elevated at 10.6 mg/dl. No intervention for calcium indicated and will just continue to monitor per Dr. Garsia. Ok to proceed with Nivolumab today.Discussed s/s of infusion related reaction. Instructed to notify clinical research nurse and physician if he has to seek medical attention prior to our next visit. Instructed to call physician and myself for any questions or concerns. Phone numbers and cycle 6 appointments for labs, MD appt, and chemo given to patient and wife. Appointments for pulmonary testing and consult also provided. Weight has not changed by 10% therefore Nivolumab dose will remain 162 mg.     Weight- 52.6 kg  Height- 157.5m  BSA- 1.52 m2  BP- 121/71  HR- 114  RR- 18  T- 98.0F  O2- 96%   `   AEs     Anemia Grade 2-  Chronic NCS. Intermittent. Grade 1 noted " today.  Anorexia Grade 2- Appetite remains poor. Patient is to continue Marinol and Periactin. Will see dietician today    Cough Grade 2- Chronic cough. Patient states that he has a constant tickling in his throat.  Ongoing  Dyspnea Grade 2- No changes still grade 2. Relieved at rest. Patient has been started on Flovent. Will see pulmonologist tomorrow.  Fatigue Grade 1- Relieved with rest per patient.  Generalized muscle weakness Grade 1- weakness perceived by patient and not evident on exam  Hyponatremia Grade 1- Ongoing and no interventions taken. NCS  Non cardiac chest pain Grade 2- Patient has history of cracked ribs and sternum. Occasional pain. Patient takes MS Contin every 12 hours and Percocet  mg Q 4 hours PRN  Pain Grade 2-  Pain to flank. Patient has history of cracked ribs and sternum. Controlled with prescription pain medications  Paresthesia Grade 1- Occasional tingling and cold sensation to fingertips. Does not interfere with ADLs.   Serum amylase elevated Grade 1- History of pancreatitis.  Not clinically significant per Dr. Garsia. Resolved per cycle 4 labs  Sinus tachycardia Grade 1- Asymptomatic. This was a baseline issue. No intervention indicated.  Weight loss Grade 1- 5 to <10% from baseline; intervention not indicated. Remains a grade 1. Appetite remains poor. Patient is to continue Marinol and Periactin. Will see dietician today   Wheezing Grade 2- Relieved by nebulizers and inhalers. Occasional no changes reported.   Hypercalcemia Grade 1- NCS. Dr. Garsia noted. No intervention. Will continue to monitor

## 2017-10-09 NOTE — PLAN OF CARE
Problem: Patient Care Overview  Goal: Plan of Care Review  Outcome: Ongoing (interventions implemented as appropriate)  Patient tolerated infusion well.  No reaction suspected.  AVS given to patient.  No questions or concerns.  Patient ambulated off unit unassisted.

## 2017-10-09 NOTE — PROGRESS NOTES
PATIENT: Karsten Salazar  MRN: [id number]  DATE: 10/9/2017      Diagnosis:   1. Recurrent non-small cell lung cancer    2. Mediastinal lymphadenopathy    3. Exam for clinical trial    4. Cancer associated pain        Chief Complaint: Lung Cancer      Oncologic History:      Oncologic History Non-small cell lung cancer diagnosed 6/2016   Recurrent disease to lung 5/2017    Oncologic Treatment Concurrent chemoXRT with weekly carboplatin and paclitaxel, XRT to a dose of 5840 cGy 10/31/2016  Carboplatin/gemcitabine 6/2/2017 - 7/2017 (discontinued due to progression)  Ipilimumab plus nivolumab  clinical trial 8/14/17     Pathology Squamous cell carcinoma, T3, N2, M0 (6/2016), PD-L1 TPS 1%        Subjective:    Interval History: Mr. Salazar is a 67 y.o. male who is seen in follow-up for lung cancer.  He states that he still has some ongoing shortness of breath which is relatively unchanged.  His pain is better controlled.  He still has a decreased appetite but his weight has now stabilized.  He has no other new complaints.    He has previously seen Dr. Ubaldo Thomason at [facility].  His history dates to 6/2016 when he sought medical attention for shortness of breath and cough.  He was found to have a large lung mass in the right upper lobe with mediastinal lymphadenopathy.  He underwent subsequent workup, however, symptoms worsened and progressed to him having a cardiac arrest requiring CPR and prolonged ICU stay.  Eventually a biopsy was performed showing squamous cell lung cancer.  This was initially staged as a T3, N2.  He was treated with concurrent chemotherapy and radiation until 10/31/16.  He is an on surveillance since this time.  His insurance changed and is now following and [facility].  Repeat PET/CT had shown locally recurrent disease.  MRI of the brain was negative.  He was started on carboplatin and gemcitabine on 6/2/17.  CT on 7/19/17 and had indicated progressive disease.  He was started on ipilimumab and  nivolumab on a clinical trial on 8/14/17.  CT in 9/2017 had revealed stable disease.      Past Medical History:   Past Medical History:   Diagnosis Date    Cancer associated pain 8/28/2017    Centrilobular emphysema 9/11/2017    Exam for clinical trial 8/9/2017    History of cardiac arrest     Hyperlipidemia     Hypertension     Mediastinal lymphadenopathy 5/24/2017    Primary cancer of right lung 4/6/2017    Protein calorie malnutrition 9/11/2017    Recurrent non-small cell lung cancer 5/24/2017       Past Surgical HIstory:   Past Surgical History:   Procedure Laterality Date    HERNIA REPAIR         Family History:   Family History   Problem Relation Age of Onset    Cancer Mother        Social History:  reports that he has quit smoking. His smoking use included Cigarettes. He has a 72.00 pack-year smoking history. He has never used smokeless tobacco. He reports that he drinks about 10.2 oz of alcohol per week . He reports that he does not use drugs.    Allergies:  Review of patient's allergies indicates:  No Known Allergies    Medications:  Current Outpatient Prescriptions   Medication Sig Dispense Refill    albuterol 90 mcg/actuation inhaler Inhale 2 puffs into the lungs every 6 (six) hours as needed for Wheezing. 1 Inhaler 0    albuterol-ipratropium 2.5mg-0.5mg/3mL (DUO-NEB) 0.5 mg-3 mg(2.5 mg base)/3 mL nebulizer solution Take 3 mLs by nebulization every 4 (four) hours as needed for Wheezing. 20 vial 0    aspirin (ECOTRIN) 81 MG EC tablet Take 81 mg by mouth.      atorvastatin (LIPITOR) 40 MG tablet Take 40 mg by mouth.      benzonatate (TESSALON) 100 MG capsule Take 100 mg by mouth.      carvedilol (COREG) 3.125 MG tablet TAKE 1 TABLET BY MOUTH TWICE DAILY 180 tablet 0    carvedilol (COREG) 3.125 MG tablet TAKE 1 TABLET BY MOUTH TWICE DAILY 60 tablet 0    cyproheptadine (PERIACTIN) 4 mg tablet Take 1 tablet (4 mg total) by mouth 3 (three) times daily. 90 tablet 2    dronabinol (MARINOL) 5  MG capsule Take 1 capsule (5 mg total) by mouth 2 (two) times daily before meals. 60 capsule 0    fluticasone (FLOVENT HFA) 110 mcg/actuation inhaler Inhale 2 puffs into the lungs 2 (two) times daily. Controller 12 g 0    guaifenesin-codeine 100-10 mg/5 ml (TUSSI-ORGANIDIN NR)  mg/5 mL syrup Take 5 mLs by mouth 3 (three) times daily as needed for Cough.      lisinopril (PRINIVIL,ZESTRIL) 2.5 MG tablet Take 2.5 mg by mouth.      morphine (MS CONTIN) 15 MG 12 hr tablet Take 1 tablet (15 mg total) by mouth 2 (two) times daily. 60 tablet 0    moxifloxacin (AVELOX) 400 mg tablet Take 1 tablet (400 mg total) by mouth once daily. 7 tablet 0    ondansetron (ZOFRAN, AS HYDROCHLORIDE,) 4 MG tablet Take 1 tablet (4 mg total) by mouth every 8 (eight) hours as needed for Nausea. 60 tablet 1    oxycodone-acetaminophen (PERCOCET)  mg per tablet Take 1 tablet by mouth every 4 (four) hours as needed for Pain. 90 tablet 0    tiotropium (SPIRIVA) 18 mcg inhalation capsule Inhale 1 capsule (18 mcg total) into the lungs once daily. 30 capsule 1     No current facility-administered medications for this visit.        Review of Systems   Constitutional: Positive for appetite change. Negative for chills, fatigue, fever and unexpected weight change.   HENT: Negative for dental problem, sinus pressure and sneezing.    Eyes: Negative for visual disturbance.   Respiratory: Positive for cough and shortness of breath. Negative for choking and chest tightness.    Cardiovascular: Positive for chest pain. Negative for leg swelling.   Gastrointestinal: Negative for abdominal pain, blood in stool, constipation, diarrhea and nausea.   Genitourinary: Negative for difficulty urinating and dysuria.   Musculoskeletal: Negative for arthralgias and back pain.   Skin: Negative for rash and wound.   Neurological: Negative for dizziness, light-headedness and headaches.   Hematological: Negative for adenopathy. Does not bruise/bleed easily.    Psychiatric/Behavioral: Negative for sleep disturbance. The patient is not nervous/anxious.        ECOG Performance Status:   ECOG SCORE           Objective:      Vitals:   Vitals:    10/09/17 0928   BP: 121/71   BP Location: Right arm   Patient Position: Sitting   BP Method: Medium (Automatic)   Pulse: (!) 114   Resp: 18   Temp: 98 °F (36.7 °C)   TempSrc: Oral   SpO2: 96%   Weight: 52.6 kg (115 lb 15.4 oz)     BMI: Body mass index is 21.21 kg/m².    Physical Exam   Constitutional: He is oriented to person, place, and time. He appears well-developed and well-nourished.   HENT:   Head: Normocephalic and atraumatic.   Eyes: Pupils are equal, round, and reactive to light.   Neck: Normal range of motion. Neck supple.   Cardiovascular: Normal rate and regular rhythm.    Pulmonary/Chest: Effort normal and breath sounds normal. No respiratory distress.   Abdominal: Soft. He exhibits no distension. There is no tenderness.   Musculoskeletal: He exhibits no edema or tenderness.   Lymphadenopathy:     He has no cervical adenopathy.   Neurological: He is alert and oriented to person, place, and time. No cranial nerve deficit.   Skin: Skin is warm and dry.   Psychiatric: He has a normal mood and affect. His behavior is normal.       Laboratory Data:  Lab Visit on 10/09/2017   Component Date Value Ref Range Status    Sodium 10/09/2017 130* 136 - 145 mmol/L Final    Potassium 10/09/2017 4.1  3.5 - 5.1 mmol/L Final    Chloride 10/09/2017 94* 95 - 110 mmol/L Final    CO2 10/09/2017 28  23 - 29 mmol/L Final    Glucose 10/09/2017 101  70 - 110 mg/dL Final    BUN, Bld 10/09/2017 10  8 - 23 mg/dL Final    Creatinine 10/09/2017 0.9  0.5 - 1.4 mg/dL Final    Calcium 10/09/2017 10.6* 8.7 - 10.5 mg/dL Final    Total Protein 10/09/2017 9.4* 6.0 - 8.4 g/dL Final    Albumin 10/09/2017 3.2* 3.5 - 5.2 g/dL Final    Total Bilirubin 10/09/2017 0.4  0.1 - 1.0 mg/dL Final    Comment: For infants and newborns, interpretation of results  should be based  on gestational age, weight and in agreement with clinical  observations.  Premature Infant recommended reference ranges:  Up to 24 hours.............<8.0 mg/dL  Up to 48 hours............<12.0 mg/dL  3-5 days..................<15.0 mg/dL  6-29 days.................<15.0 mg/dL      Alkaline Phosphatase 10/09/2017 99  55 - 135 U/L Final    AST 10/09/2017 15  10 - 40 U/L Final    ALT 10/09/2017 14  10 - 44 U/L Final    Anion Gap 10/09/2017 8  8 - 16 mmol/L Final    eGFR if African American 10/09/2017 >60.0  >60 mL/min/1.73 m^2 Final    eGFR if non African American 10/09/2017 >60.0  >60 mL/min/1.73 m^2 Final    Comment: Calculation used to obtain the estimated glomerular filtration  rate (eGFR) is the CKD-EPI equation. Since race is unknown   in our information system, the eGFR values for   -American and Non--American patients are given   for each creatinine result.      Magnesium 10/09/2017 1.7  1.6 - 2.6 mg/dL Final    WBC 10/09/2017 8.47  3.90 - 12.70 K/uL Final    RBC 10/09/2017 3.87* 4.60 - 6.20 M/uL Final    Hemoglobin 10/09/2017 10.8* 14.0 - 18.0 g/dL Final    Hematocrit 10/09/2017 33.6* 40.0 - 54.0 % Final    MCV 10/09/2017 87  82 - 98 fL Final    MCH 10/09/2017 27.9  27.0 - 31.0 pg Final    MCHC 10/09/2017 32.1  32.0 - 36.0 g/dL Final    RDW 10/09/2017 14.5  11.5 - 14.5 % Final    Platelets 10/09/2017 517* 150 - 350 K/uL Final    MPV 10/09/2017 8.4* 9.2 - 12.9 fL Final    Gran # 10/09/2017 4.6  1.8 - 7.7 K/uL Final    Lymph # 10/09/2017 2.2  1.0 - 4.8 K/uL Final    Mono # 10/09/2017 1.1* 0.3 - 1.0 K/uL Final    Eos # 10/09/2017 0.6* 0.0 - 0.5 K/uL Final    Baso # 10/09/2017 0.03  0.00 - 0.20 K/uL Final    Gran% 10/09/2017 54.7  38.0 - 73.0 % Final    Lymph% 10/09/2017 25.6  18.0 - 48.0 % Final    Mono% 10/09/2017 12.4  4.0 - 15.0 % Final    Eosinophil% 10/09/2017 6.8  0.0 - 8.0 % Final    Basophil% 10/09/2017 0.4  0.0 - 1.9 % Final    Differential  Method 10/09/2017 Automated   Final         Imaging:    CT 9/22/17  CT CHEST, ABDOMEN, and, PELVIS  with IV contrast    Protocol:  Axial images of the chest, abdomen, and pelvis were acquired  after the use of 75 cc [id number] IV contrast.  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     COMPARISON: CT 07/19/2017     FINDINGS:    Thoracic chest wall soft tissues: No significant abnormality.     Aorta: Normal in course and caliber, with mild atherosclerotic plaque. There are three branching vessels at the arch. There is moderate coronary artery atherosclerotic calcification.      Heart: Normal in size.  There is a stable small pericardial effusion.     Anna Marie/Mediastinum: No significant lymphadenopathy     Lungs: There is redemonstration of RIGHT pleural thickening and trace RIGHT pleural fluid.  There is a soft tissue mass in the anterior RIGHT upper lobe measuring 4.7 cm maximally (axial series 2 image 18) which abuts the RIGHT paramediastinal border and SVC without focal narrowing.  There is associated volume loss in the RIGHT upper lobe and distortion with patent air bronchograms.  Soft tissue mass at the RIGHT lower lobe measures 3.8 cm maximally with central low attenuation suggesting necrosis (axial series 2 image 35).  At the RIGHT diaphragmatic marichuy there is a 3.4 cm soft tissue mass with central heterogenous attenuation which abuts the RIGHT kidney and RIGHT neural foramen at the T11-T12 level (axial series 2 image 50, previously 2.1 cm). Right lung also has patchy density in the middle and  upper zones suggesting radiation therapy change. The anterior LEFT upper lobe demonstrates a fibrocalcific scar and small bleb.  LEFT apical bleb is also noted.  There is moderate emphysema.    Liver: Normal in size and attenuation, with no focal hepatic lesions.       Gallbladder: There is cholelithiasis.     Bile Ducts: No  evidence of dilated ducts.     Pancreas: No mass or peripancreatic fat stranding. Chronic pancreatic head calcifications are again noted likely related to chronic pancreatitis.     Spleen: Normal.     Adrenals: Normal.     GI Tract/Mesentery: No evidence of bowel obstruction or inflammation.     Kidneys/ Ureters: Normal in size and location.No hydronephrosis or nephrolithiasis. No ureteral dilatation.     Bladder: There is diffuse thickening of the bladder walls.     Reproductive organs: Normal.     Retroperitoneum:  No significant adenopathy.      Peritoneal Space: Mild ascites. No free air.     Abdominal wall:  There is a RIGHT inguinal hernia containing a portion of the anterior bladder and a loop of bowel.      Vasculature: There is severe aortobiiliac atherosclerotic calcification with   significant stenosis at the origin of both common iliac arteries and threadlike flow through the RIGHT common femoral artery.     Bones: No acute fracture.  There are healing changes of a  midsternal fracture and bilateral anterior rib fractures, similar to prior, incompletely healed. No osseous lytic or blastic lesion.  Postoperative changes of a intramedullary vesna and screw in the RIGHT femur are again noted.   Impression          Interval enlargement of masses at the right lower lobe and right diaphragmatic marichuy compatible with malignancy.    Severe aortobiiliac atherosclerotic plaque with likely hemodynamically significant stenosis at the origin of both common iliac arteries and   the right common femoral artery.    Diffuse bladder wall thickening which can be seen in chronic bladder outlet obstruction or cystitis.  Correlate with urinalysis.  Note that a portion of the anterolateral bladder margin herniates into a right inguinal hernia which also contains a loop of small bowel without obstruction.    Small pericardial effusion.    Multiple incompletely healed fractues of the anterior chest wall.      Cholelithiasis. Pancreatic calcifications consistent with chronic calcific pancreatitis.    Additional stable findings as detailed above.    RECIST SUMMARY: Date of prior examination for comparison 07/19/2017  Right upper lobe mass measures 4.7 cm maximally (axial series 2 image 18), previously 5.6 cm  Right lower lobe mass measures 3.8 cm maximally (axial series 2/75), previously 2.8 cm  Diaphragmatic marichuy mass measures 3.4 cm maximally (axial series 2 image 50), previously 2.1 cm    This report has been flagged in the [facility] medical record.               Assessment:       1. Recurrent non-small cell lung cancer    2. Mediastinal lymphadenopathy    3. Exam for clinical trial    4. Cancer associated pain           Plan:     Mr. Salazar will continue on with nivolumab today.  He is meeting with the dietitian today and I will see if we can add a prealbumin to today's labs.  Additionally he is meeting with pulmonary tomorrow.  Follow back up with me per protocol.    Gomez Garsia DO, FACP  Hematology & Oncology  [address] [place] [address]  ph. [phone]  Fax. [phone]

## 2017-10-10 ENCOUNTER — HOSPITAL ENCOUNTER (OUTPATIENT)
Dept: PULMONOLOGY | Facility: CLINIC | Age: 67
Discharge: HOME | End: 2017-10-10
Payer: MEDICARE

## 2017-10-10 ENCOUNTER — OFFICE VISIT (OUTPATIENT)
Dept: PULMONOLOGY | Facility: CLINIC | Age: 67
End: 2017-10-10
Payer: MEDICARE

## 2017-10-10 ENCOUNTER — CLINICAL SUPPORT (OUTPATIENT)
Dept: HEMATOLOGY/ONCOLOGY | Facility: CLINIC | Age: 67
End: 2017-10-10
Payer: MEDICARE

## 2017-10-10 VITALS
HEART RATE: 99 BPM | HEIGHT: 62 IN | DIASTOLIC BLOOD PRESSURE: 70 MMHG | SYSTOLIC BLOOD PRESSURE: 108 MMHG | WEIGHT: 116 LBS | BODY MASS INDEX: 21.35 KG/M2 | OXYGEN SATURATION: 98 %

## 2017-10-10 VITALS — WEIGHT: 115.94 LBS | HEIGHT: 62 IN | BODY MASS INDEX: 21.34 KG/M2

## 2017-10-10 VITALS — HEIGHT: 63 IN | BODY MASS INDEX: 20.55 KG/M2 | WEIGHT: 116 LBS

## 2017-10-10 DIAGNOSIS — C34.81 MALIGNANT NEOPLASM OF OVERLAPPING SITES OF RIGHT LUNG: Primary | ICD-10-CM

## 2017-10-10 DIAGNOSIS — C34.91 NON-SMALL CELL CANCER OF RIGHT LUNG: ICD-10-CM

## 2017-10-10 DIAGNOSIS — Z71.3 NUTRITIONAL COUNSELING: Primary | ICD-10-CM

## 2017-10-10 DIAGNOSIS — J44.9 CHRONIC OBSTRUCTIVE PULMONARY DISEASE, UNSPECIFIED COPD TYPE: Primary | ICD-10-CM

## 2017-10-10 DIAGNOSIS — C34.91 PRIMARY CANCER OF RIGHT LUNG: ICD-10-CM

## 2017-10-10 DIAGNOSIS — J44.9 CHRONIC OBSTRUCTIVE PULMONARY DISEASE, UNSPECIFIED COPD TYPE: ICD-10-CM

## 2017-10-10 LAB
PRE FEV1 FVC: 66
PRE FEV1: 0.78
PRE FVC: 1.18
PREDICTED FEV1 FVC: 81
PREDICTED FEV1: 2.24
PREDICTED FVC: 2.77

## 2017-10-10 PROCEDURE — 99999 PR PBB SHADOW E&M-EST. PATIENT-LVL III: CPT | Mod: PBBFAC,,, | Performed by: INTERNAL MEDICINE

## 2017-10-10 PROCEDURE — 99204 OFFICE O/P NEW MOD 45 MIN: CPT | Mod: 25,S$GLB,, | Performed by: INTERNAL MEDICINE

## 2017-10-10 PROCEDURE — 94620 PR PULMONARY STRESS TESTING,SIMPLE: CPT | Mod: S$GLB,,, | Performed by: INTERNAL MEDICINE

## 2017-10-10 PROCEDURE — 94010 BREATHING CAPACITY TEST: CPT | Mod: S$GLB,,, | Performed by: INTERNAL MEDICINE

## 2017-10-10 PROCEDURE — 94729 DIFFUSING CAPACITY: CPT | Mod: 53,S$GLB,, | Performed by: INTERNAL MEDICINE

## 2017-10-10 PROCEDURE — 99999 PR PBB SHADOW E&M-EST. PATIENT-LVL II: CPT | Mod: PBBFAC,,,

## 2017-10-10 PROCEDURE — 97802 MEDICAL NUTRITION INDIV IN: CPT | Mod: S$GLB,,, | Performed by: DIETITIAN, REGISTERED

## 2017-10-10 NOTE — LETTER
October 11, 2017      Gomez Garsia, , FACP  [address] [facility] [address]           [facility]  [address]  [place] [address]  Phone: [phone]          Patient: Karsten Salazar   MR Number: [id number]   YOB: 1950   Date of Visit: 10/10/2017       Dear Dr. Gomez Garsia:    Thank you for referring Karsten Salazar to me for evaluation. Attached you will find relevant portions of my assessment and plan of care.    If you have questions, please do not hesitate to call me. I look forward to following Karsten Salazar along with you.    Sincerely,    Darrel Wade MD    Enclosure  CC:  No Recipients    If you would like to receive this communication electronically, please contact [email or web address] or [phone] to request more information on [facility] access.    For providers and/or their staff who would like to refer a patient to [facility], please contact us through our one-stop-shop provider referral line, [facility], at [phone].    If you feel you have received this communication in error or would no longer like to receive these types of communications, please e-mail [email or web address]

## 2017-10-10 NOTE — PROGRESS NOTES
"Medical Nutrition Therapy Oncology Progress Note    Name: Karsten Salazar MRN: [id number]  : 1950    Age: 67 y.o.  Ethnicity: /Black Language: English    Diagnosis: No diagnosis found.    Chemo Regimen: Ipilimumab + nivolumab   Referring MD: Dr. Garsia Frequency:  Radiation: No           Goal of Cancer treatment n/a         Nutrition Assessment     Chief Complaint:   Chief Complaint   Patient presents with    Lung Cancer    Nutrition Counseling        Anthropometric Measurements:  Height: 5' 2" (1.575 m)  Current Weight: 52.6 kg (115 lb 15.4 oz)  Ideal Body Weight: 118#  Percent Ideal Body Weight:: 101%  BMI: Body mass index is 21.21 kg/m². normal    Weight History:   Wt Readings from Last 8 Encounters:   10/10/17 52.6 kg (115 lb 15.4 oz)   10/09/17 52.6 kg (115 lb 15.4 oz)   17 52.3 kg (115 lb 4.8 oz)   17 53.9 kg (118 lb 13.3 oz)   17 55.8 kg (123 lb)   17 55.8 kg (123 lb)   17 56 kg (123 lb 7.3 oz)   17 55.3 kg (122 lb)        SYMPTOM: COMMENTS:   feel full quickly Small portion sizes   weight loss Per patient, 20# weight loss in 3 months             Medical Health History:  Feeding tube placed: No  Pre-treatment: No    Past Surgical History:   Procedure Laterality Date    HERNIA REPAIR          Medications:   Current Outpatient Prescriptions:     albuterol 90 mcg/actuation inhaler, Inhale 2 puffs into the lungs every 6 (six) hours as needed for Wheezing., Disp: 1 Inhaler, Rfl: 0    albuterol-ipratropium 2.5mg-0.5mg/3mL (DUO-NEB) 0.5 mg-3 mg(2.5 mg base)/3 mL nebulizer solution, Take 3 mLs by nebulization every 4 (four) hours as needed for Wheezing., Disp: 20 vial, Rfl: 0    aspirin (ECOTRIN) 81 MG EC tablet, Take 81 mg by mouth., Disp: , Rfl:     atorvastatin (LIPITOR) 40 MG tablet, Take 40 mg by mouth., Disp: , Rfl:     benzonatate (TESSALON) 100 MG capsule, Take 100 mg by mouth., Disp: , Rfl:     carvedilol (COREG) 3.125 MG tablet, TAKE 1 TABLET " BY MOUTH TWICE DAILY, Disp: 180 tablet, Rfl: 0    carvedilol (COREG) 3.125 MG tablet, TAKE 1 TABLET BY MOUTH TWICE DAILY, Disp: 60 tablet, Rfl: 0    cyproheptadine (PERIACTIN) 4 mg tablet, Take 1 tablet (4 mg total) by mouth 3 (three) times daily., Disp: 90 tablet, Rfl: 2    dronabinol (MARINOL) 5 MG capsule, Take 1 capsule (5 mg total) by mouth 2 (two) times daily before meals., Disp: 60 capsule, Rfl: 0    fluticasone (FLOVENT HFA) 110 mcg/actuation inhaler, Inhale 2 puffs into the lungs 2 (two) times daily. Controller, Disp: 12 g, Rfl: 0    guaifenesin-codeine 100-10 mg/5 ml (TUSSI-ORGANIDIN NR)  mg/5 mL syrup, Take 5 mLs by mouth 3 (three) times daily as needed for Cough., Disp: , Rfl:     lisinopril (PRINIVIL,ZESTRIL) 2.5 MG tablet, Take 2.5 mg by mouth., Disp: , Rfl:     magnesium hydroxide 400 mg/5 ml (MILK OF MAGNESIA) 400 mg/5 mL Susp, Take 30 mLs by mouth daily as needed., Disp: , Rfl:     morphine (MS CONTIN) 15 MG 12 hr tablet, Take 1 tablet (15 mg total) by mouth 2 (two) times daily., Disp: 60 tablet, Rfl: 0    ondansetron (ZOFRAN, AS HYDROCHLORIDE,) 4 MG tablet, Take 1 tablet (4 mg total) by mouth every 8 (eight) hours as needed for Nausea., Disp: 60 tablet, Rfl: 1    oxycodone-acetaminophen (PERCOCET)  mg per tablet, Take 1 tablet by mouth every 4 (four) hours as needed for Pain., Disp: 90 tablet, Rfl: 0    tiotropium (SPIRIVA) 18 mcg inhalation capsule, Inhale 1 capsule (18 mcg total) into the lungs once daily., Disp: 30 capsule, Rfl: 1  No current facility-administered medications for this visit.     Last Labs:  Last Labs:  Glucose   Date Value Ref Range Status   10/09/2017 101 70 - 110 mg/dL Final   09/25/2017 111 (H) 70 - 110 mg/dL Final     BUN, Bld   Date Value Ref Range Status   10/09/2017 10 8 - 23 mg/dL Final   09/25/2017 10 8 - 23 mg/dL Final     Creatinine   Date Value Ref Range Status   10/09/2017 0.9 0.5 - 1.4 mg/dL Final   09/25/2017 0.9 0.5 - 1.4 mg/dL Final      Sodium   Date Value Ref Range Status   10/09/2017 130 (L) 136 - 145 mmol/L Final   09/25/2017 127 (L) 136 - 145 mmol/L Final     Potassium   Date Value Ref Range Status   10/09/2017 4.1 3.5 - 5.1 mmol/L Final   09/25/2017 4.5 3.5 - 5.1 mmol/L Final     No results found for: PHOS  Calcium   Date Value Ref Range Status   10/09/2017 10.6 (H) 8.7 - 10.5 mg/dL Final   09/25/2017 9.2 8.7 - 10.5 mg/dL Final     No results found for: PREALBUMIN  Total Protein   Date Value Ref Range Status   10/09/2017 9.4 (H) 6.0 - 8.4 g/dL Final   09/25/2017 8.1 6.0 - 8.4 g/dL Final     No results found for: CHOL  No results found for: HGBA1C  Hemoglobin   Date Value Ref Range Status   10/09/2017 10.8 (L) 14.0 - 18.0 g/dL Final   09/25/2017 9.5 (L) 14.0 - 18.0 g/dL Final     Hematocrit   Date Value Ref Range Status   10/09/2017 33.6 (L) 40.0 - 54.0 % Final   09/25/2017 28.0 (L) 40.0 - 54.0 % Final     No results found for: IRON  No components found for: FROLATE  No results found for: [id number]  WBC   Date Value Ref Range Status   10/09/2017 8.47 3.90 - 12.70 K/uL Final   09/25/2017 8.60 3.90 - 12.70 K/uL Final     Client History/Food Access:    Living Situation: Lives with spouse   Who: Shops for Groceries? Patient and Spouse   Who : Prepares meals? Patient and Spouse   Who: Fills prescriptions? Patient and Spouse   Are there financial difficulties purchasing food? No   Personal History (occupation, physical activity level, exercise):      Baseline for Outcomes Monitoring  Food and Nutrition History: Pt here with wife for nutrition counseling. Pt reports 20# weight loss in 3 months, with stable weight x 2 weeks. Pt reports fair appetite, but eats small portions at each meal. Pt was drinking 1 Ensure Plus each day, but recently increased to 3/day. Dislikes Boost. Pt occasionally drinks The Hulk smoothie from [employer]. Pt eats fairly frequently throughout the day. Encouraged high calorie, high protein foods like nuts, nut butter,  olive oil, ice cream, etc. Provided high calorie, high protein recipes. Pt taking Marinol and Periactin, but hasn't noticed increase in appetite. Encouraged pt to take as prescribed for at least 2 weeks for best results. Pt complains of some pain in top teeth occasionally when eating; making dentist appt. Encouraged soft foods as needed. Provided contact information and answered questions.  24-hour recall:  Breakfast: banana, Ensure Plus  Snack: grits, egg, toast  Lunch: 1 slice of bread with meat, cheese, brown, Ensure Plus  Dinner: chicken with rice and gravy  Snack: Ensure Plus    Nutritional Needs:  Estimated Needs Method Use    [id number] kcal/day [] Predictive Equation: Mcfarland-Allenwood   [x]  30-35 kcal/kg   Protein 60-80g 1.2-1.5 gm/kg/day   Fluid 1600ml 30ml/kg/day     Food/Nutrient Intake (oral, enteral or parenteral) and Patient Behaviors     Calorie intake: Adequate   Protein intake: Adequate     Yes/No    Yes Uses medical food supplements   Yes Cooking techniques to minimize fatigue   No Currently modifying food textures   N/A Able to maintain usual physical actiivty     Nutrition Diagnosis     Nutrition Diagnosis Related to (Etiology) As Evidenced By (Signs/Symptoms)   Increased nutrient needs Increased demand for nutrients Chemo with early satiety and weight loss                 Nutritional Intervention and Prescription        Nutrition Intervention Composition of meals/snacks   Goals/Expected Outcomes Pt to eat small, frequent meals throughout the day for adequate calories and protein.   Progress Progressing towards goal     Nutrition Intervention Energy-modified diet and Protein-modified diet   Goals/Expected Outcomes Pt to choose high calorie, high protein foods and beverages to maintain/gain weight throughout treatment.   Progress Initial     Nutrition Intervention Medical food supplement: Commercial beverage   Goals/Expected Outcomes Pt to continue drinking 3 Ensure Plus each day for additional  calories and protein.   Progress Goal Met     Pt needs education? yes (see intervention)    Coordination of Nutrition Care: Comments:   Collaboration with other providers MD         Monitoring and Evaluation     Monitor: weight    Next Visit: PRN    Materials Provided:  1. RD contact information 2. Soft foods and ways to increase calories and protein   3. High calorie, high protein recipes            Total time: 30 minutes    Nutrition Score:      ©2010 [employer] Oncology Toolkit

## 2017-10-11 NOTE — PROGRESS NOTES
Subjective:       Patient ID: Karsten Salazar is a 67 y.o. male.    Chief Complaint: No chief complaint on file.    67 y.o. Male with squamous cell lung cancer. Patient has been receiving chemotherapy with Dr. Garsia in Oncology clinic. Patient is receiving clinical trial chemotherapy currently. Patient reports stable SOB but worsening ZAMBRANO. Patient states he has dyspnea with minimal exertion now. Having difficulties with ADLs due to dyspnea. Patient states he has a worsening cough and some yellow sputum. Denies fever, chills or chest pain. Ongoing weight loss.   Patient is currently on Flovent, Albuterol inhaler PRN and Nebulizer at home.       Review of Systems   Constitutional: Positive for weight loss and appetite change.   HENT: Negative for postnasal drip and congestion.    Eyes: Negative for itching.   Respiratory: Positive for cough, shortness of breath and dyspnea on extertion.    Cardiovascular: Negative for chest pain and leg swelling.   Genitourinary: Negative for difficulty urinating.   Musculoskeletal: Negative for arthralgias.   Skin: Negative for rash.   Gastrointestinal: Negative for abdominal pain and abdominal distention.   Neurological: Negative for dizziness and headaches.   Hematological: Negative for adenopathy.   Psychiatric/Behavioral: Negative for confusion. The patient is not nervous/anxious.        Past Medical History:   Diagnosis Date    Cancer associated pain 8/28/2017    Centrilobular emphysema 9/11/2017    Exam for clinical trial 8/9/2017    History of cardiac arrest     Hyperlipidemia     Hypertension     Mediastinal lymphadenopathy 5/24/2017    Primary cancer of right lung 4/6/2017    Protein calorie malnutrition 9/11/2017    Recurrent non-small cell lung cancer 5/24/2017     Past Surgical History:   Procedure Laterality Date    HERNIA REPAIR       Family History   Problem Relation Age of Onset    Cancer Mother      Social History     Social History    Marital status:  "     Spouse name: N/A    Number of children: N/A    Years of education: N/A     Occupational History    Not on file.     Social History Main Topics    Smoking status: Former Smoker     Packs/day: 1.50     Years: 48.00     Types: Cigarettes    Smokeless tobacco: Never Used      Comment: Quit 7/2016    Alcohol use 10.2 oz/week     15 Cans of beer, 2 Shots of liquor per week      Comment: occasional    Drug use: No    Sexual activity: Not Currently     Partners: Male     Other Topics Concern    Not on file     Social History Narrative    No narrative on file       Objective:       Vitals:    10/10/17 1339   BP: 108/70   Pulse: 99   SpO2: 98%   Weight: 52.6 kg (116 lb)   Height: 5' 2" (1.575 m)       Physical Exam   Constitutional: He is oriented to person, place, and time. He appears well-developed and well-nourished.   HENT:   Head: Normocephalic.   Nose: Nose normal.   Neck: Normal range of motion. Neck supple.   Cardiovascular: Normal rate and regular rhythm.    Pulmonary/Chest: He has decreased breath sounds. He has rhonchi.   Abdominal: Soft. Bowel sounds are normal.   Musculoskeletal: Normal range of motion.   Neurological: He is alert and oriented to person, place, and time.   Skin: Skin is warm and dry.   Psychiatric: He has a normal mood and affect. His behavior is normal.   Nursing note and vitals reviewed.    Personal Diagnostic Review  Patient unable to complete PFTs due to dyspnea. Moderate-Severe obstruction on Spirometry.   No flowsheet data found.      Assessment:       1. Malignant neoplasm of overlapping sites of right lung        Outpatient Encounter Prescriptions as of 10/10/2017   Medication Sig Dispense Refill    albuterol 90 mcg/actuation inhaler Inhale 2 puffs into the lungs every 6 (six) hours as needed for Wheezing. 1 Inhaler 0    albuterol-ipratropium 2.5mg-0.5mg/3mL (DUO-NEB) 0.5 mg-3 mg(2.5 mg base)/3 mL nebulizer solution Take 3 mLs by nebulization every 4 (four) hours " as needed for Wheezing. 20 vial 0    aspirin (ECOTRIN) 81 MG EC tablet Take 81 mg by mouth.      atorvastatin (LIPITOR) 40 MG tablet Take 40 mg by mouth.      benzonatate (TESSALON) 100 MG capsule Take 100 mg by mouth.      carvedilol (COREG) 3.125 MG tablet TAKE 1 TABLET BY MOUTH TWICE DAILY 180 tablet 0    carvedilol (COREG) 3.125 MG tablet TAKE 1 TABLET BY MOUTH TWICE DAILY 60 tablet 0    cyproheptadine (PERIACTIN) 4 mg tablet Take 1 tablet (4 mg total) by mouth 3 (three) times daily. 90 tablet 2    dronabinol (MARINOL) 5 MG capsule Take 1 capsule (5 mg total) by mouth 2 (two) times daily before meals. 60 capsule 0    fluticasone (FLOVENT HFA) 110 mcg/actuation inhaler Inhale 2 puffs into the lungs 2 (two) times daily. Controller 12 g 0    guaifenesin-codeine 100-10 mg/5 ml (TUSSI-ORGANIDIN NR)  mg/5 mL syrup Take 5 mLs by mouth 3 (three) times daily as needed for Cough.      lisinopril (PRINIVIL,ZESTRIL) 2.5 MG tablet Take 2.5 mg by mouth.      magnesium hydroxide 400 mg/5 ml (MILK OF MAGNESIA) 400 mg/5 mL Susp Take 30 mLs by mouth daily as needed.      morphine (MS CONTIN) 15 MG 12 hr tablet Take 1 tablet (15 mg total) by mouth 2 (two) times daily. 60 tablet 0    ondansetron (ZOFRAN, AS HYDROCHLORIDE,) 4 MG tablet Take 1 tablet (4 mg total) by mouth every 8 (eight) hours as needed for Nausea. 60 tablet 1    oxycodone-acetaminophen (PERCOCET)  mg per tablet Take 1 tablet by mouth every 4 (four) hours as needed for Pain. 90 tablet 0    tiotropium (SPIRIVA) 18 mcg inhalation capsule Inhale 1 capsule (18 mcg total) into the lungs once daily. 30 capsule 1     No facility-administered encounter medications on file as of 10/10/2017.      No orders of the defined types were placed in this encounter.    Plan:       67 year old male with     COPD and Lung Cancer- I suspect the vast majority of patient's dyspnea is related to his lung cancer. However, he does also have COPD. On ambulation today,  O2 sat decreased from 100% to 85%.   -Home O2(85% with ambulation).   -Continue bronchodilators.  -Symptomatic treatment with mucinex as needed.     Thank you for allowing me to participate in Mr. Ornelas's care.     Follow up PRN.     Darrel Wade MD

## 2017-10-14 NOTE — PROCEDURES
Karsten Salazar is a 67 y.o.  male patient, who presents for a 6 minute walk test ordered by MD. Mauro.  The diagnosis is Qualify for Oxygen.  The patient's BMI is 20.6kg/m2. Predicted distance (lower limit of normal) is 420.33 meters.    Test Results:    The test was completed without stopping.   The total time walked was 360 seconds.  During walking, the patient reported:  Dyspnea, Leg pain. The patient used no assistive devices during testing.     10/10/2017--------Distance: 283.46 meters (930 feet)     O2 Sat % Supplemental Oxygen Heart Rate Blood Pressure Marguerite Scale   Pre-exercise  (Resting) 92 % Room Air 101 bpm 121/71 3   During Exercise 84 % Room Air 116 bpm 133/85 5-6   Post-exercise   96 % Room Air  95 bpm         Recovery Time: 180 seconds    Oxygen Qualification:     O2 Sat % Supplemental Oxygen Heart Rate Blood Pressure Marguerite Scale   Pre-exercise  (Resting) 96 % 2 L/M  99 bpm  147/87  3    During Exercise 93 %  2 L/M  95 bpm  149/86  5-6    Post-exercise      2 L/M  99 bpm            Recovery Time: 96 seconds    Performing nurse/tech: JUAN JOSE Boone.    PREVIOUS STUDY:   The patient has not had a previous study.      CLINICAL INTERPRETATION:  Six minute walk distance is 283.46 meters (930 feet) with heavy dyspnea.  During exercise, there was significant desaturation while breathing room air.  Both blood pressure and heart rate remained stable with walking.  Tachycardia was present prior to exercise.  The patient reported non-pulmonary symptoms during exercise.  Significant exercise impairment is likely due to desaturation.  The patient may benefit from using supplemental oxygen during exertion.  No previous study performed.  Based upon age and body mass index, exercise capacity is less than predicted.   Oxygen saturation did improve while breathing supplemental oxygen.

## 2017-10-19 ENCOUNTER — RESEARCH ENCOUNTER (OUTPATIENT)
Dept: RESEARCH | Facility: HOSPITAL | Age: 67
End: 2017-10-19

## 2017-10-19 DIAGNOSIS — C34.90 RECURRENT NON-SMALL CELL LUNG CANCER: Primary | ICD-10-CM

## 2017-10-19 DIAGNOSIS — Z00.6 EXAMINATION OF PARTICIPANT IN CLINICAL TRIAL: ICD-10-CM

## 2017-10-23 ENCOUNTER — TELEPHONE (OUTPATIENT)
Dept: PULMONOLOGY | Facility: CLINIC | Age: 67
End: 2017-10-23

## 2017-10-23 ENCOUNTER — RESEARCH ENCOUNTER (OUTPATIENT)
Dept: RESEARCH | Facility: HOSPITAL | Age: 67
End: 2017-10-23

## 2017-10-23 ENCOUNTER — INFUSION (OUTPATIENT)
Dept: INFUSION THERAPY | Facility: HOSPITAL | Age: 67
End: 2017-10-23
Attending: INTERNAL MEDICINE
Payer: MEDICARE

## 2017-10-23 ENCOUNTER — OFFICE VISIT (OUTPATIENT)
Dept: HEMATOLOGY/ONCOLOGY | Facility: CLINIC | Age: 67
End: 2017-10-23
Payer: MEDICARE

## 2017-10-23 VITALS
SYSTOLIC BLOOD PRESSURE: 115 MMHG | DIASTOLIC BLOOD PRESSURE: 75 MMHG | OXYGEN SATURATION: 99 % | BODY MASS INDEX: 19.92 KG/M2 | HEART RATE: 108 BPM | TEMPERATURE: 98 F | WEIGHT: 112.44 LBS | RESPIRATION RATE: 18 BRPM

## 2017-10-23 VITALS
SYSTOLIC BLOOD PRESSURE: 126 MMHG | RESPIRATION RATE: 18 BRPM | HEART RATE: 102 BPM | TEMPERATURE: 98 F | DIASTOLIC BLOOD PRESSURE: 82 MMHG

## 2017-10-23 DIAGNOSIS — Z00.6 EXAM FOR CLINICAL TRIAL: ICD-10-CM

## 2017-10-23 DIAGNOSIS — E44.0 MODERATE PROTEIN-CALORIE MALNUTRITION: ICD-10-CM

## 2017-10-23 DIAGNOSIS — G89.3 CANCER ASSOCIATED PAIN: ICD-10-CM

## 2017-10-23 DIAGNOSIS — C34.90 RECURRENT NON-SMALL CELL LUNG CANCER: Primary | ICD-10-CM

## 2017-10-23 DIAGNOSIS — Z09 CHEMOTHERAPY FOLLOW-UP EXAMINATION: ICD-10-CM

## 2017-10-23 DIAGNOSIS — R63.0 DECREASED APPETITE: ICD-10-CM

## 2017-10-23 DIAGNOSIS — C34.90 RECURRENT NON-SMALL CELL LUNG CANCER: ICD-10-CM

## 2017-10-23 DIAGNOSIS — J43.2 CENTRILOBULAR EMPHYSEMA: ICD-10-CM

## 2017-10-23 DIAGNOSIS — C34.91 PRIMARY CANCER OF RIGHT LUNG: Primary | ICD-10-CM

## 2017-10-23 PROCEDURE — 96365 THER/PROPH/DIAG IV INF INIT: CPT

## 2017-10-23 PROCEDURE — 96361 HYDRATE IV INFUSION ADD-ON: CPT

## 2017-10-23 PROCEDURE — 96413 CHEMO IV INFUSION 1 HR: CPT

## 2017-10-23 PROCEDURE — 25000003 PHARM REV CODE 250: Performed by: INTERNAL MEDICINE

## 2017-10-23 PROCEDURE — 99215 OFFICE O/P EST HI 40 MIN: CPT | Mod: S$GLB,,, | Performed by: INTERNAL MEDICINE

## 2017-10-23 PROCEDURE — 99999 PR PBB SHADOW E&M-EST. PATIENT-LVL IV: CPT | Mod: PBBFAC,,, | Performed by: INTERNAL MEDICINE

## 2017-10-23 PROCEDURE — 96375 TX/PRO/DX INJ NEW DRUG ADDON: CPT

## 2017-10-23 PROCEDURE — 96366 THER/PROPH/DIAG IV INF ADDON: CPT

## 2017-10-23 RX ORDER — OXYCODONE AND ACETAMINOPHEN 10; 325 MG/1; MG/1
1 TABLET ORAL EVERY 4 HOURS PRN
Qty: 90 TABLET | Refills: 0 | Status: SHIPPED | OUTPATIENT
Start: 2017-10-23 | End: 2017-11-15 | Stop reason: SDUPTHER

## 2017-10-23 RX ORDER — LORAZEPAM/0.9% SODIUM CHLORIDE 100MG/0.1L
2 PLASTIC BAG, INJECTION (ML) INTRAVENOUS
Status: COMPLETED | OUTPATIENT
Start: 2017-10-23 | End: 2017-10-23

## 2017-10-23 RX ORDER — HEPARIN 100 UNIT/ML
500 SYRINGE INTRAVENOUS
Status: CANCELLED | OUTPATIENT
Start: 2017-10-23

## 2017-10-23 RX ORDER — SODIUM CHLORIDE 0.9 % (FLUSH) 0.9 %
10 SYRINGE (ML) INJECTION
Status: CANCELLED | OUTPATIENT
Start: 2017-10-23

## 2017-10-23 RX ORDER — SODIUM CHLORIDE 0.9 % (FLUSH) 0.9 %
10 SYRINGE (ML) INJECTION
Status: DISCONTINUED | OUTPATIENT
Start: 2017-10-23 | End: 2017-10-23 | Stop reason: HOSPADM

## 2017-10-23 RX ORDER — LORAZEPAM/0.9% SODIUM CHLORIDE 100MG/0.1L
2 PLASTIC BAG, INJECTION (ML) INTRAVENOUS
Status: CANCELLED | OUTPATIENT
Start: 2017-10-23 | End: 2017-10-23

## 2017-10-23 RX ORDER — MEGESTROL ACETATE 40 MG/ML
400 SUSPENSION ORAL DAILY
Qty: 480 ML | Refills: 11 | Status: SHIPPED | OUTPATIENT
Start: 2017-10-23 | End: 2018-10-23

## 2017-10-23 RX ORDER — MORPHINE SULFATE 30 MG/1
30 TABLET, FILM COATED, EXTENDED RELEASE ORAL 2 TIMES DAILY
Qty: 60 TABLET | Refills: 0 | Status: SHIPPED | OUTPATIENT
Start: 2017-10-23 | End: 2017-11-06

## 2017-10-23 RX ADMIN — MAGNESIUM SULFATE IN WATER 2 G: 40 INJECTION, SOLUTION INTRAVENOUS at 10:10

## 2017-10-23 RX ADMIN — SODIUM CHLORIDE: 9 INJECTION, SOLUTION INTRAVENOUS at 10:10

## 2017-10-23 NOTE — TELEPHONE ENCOUNTER
----- Message from Iban Roberts sent at 10/23/2017 10:13 AM CDT -----  Contact: pt   Pt would like to be called back regarding speaking with nurse about never receiving oxygen tank.       Pt can be reached at [phone].

## 2017-10-23 NOTE — TELEPHONE ENCOUNTER
----- Message from Sandra Sanchez sent at 10/23/2017  1:53 PM CDT -----  Contact: pt's wife Sanjuanita   Pt's wife is calling in regards to the prescription for oxycodone-acetaminophen (PERCOCET)  mg per tablet. Pt uses [facility] [address] [place] [address] [address] [facility] [address]  & [facility]. Pts' wife would like to know if prescription can be called in before she goes back to [facility]    Pt's wife can be reached at [phone].    Thank you

## 2017-10-23 NOTE — PROGRESS NOTES
PATIENT: Karsten Salazar  MRN: [id number]  DATE: 10/23/2017      Diagnosis:   1. Recurrent non-small cell lung cancer    2. Exam for clinical trial    3. Chemotherapy follow-up examination    4. Cancer associated pain    5. Moderate protein-calorie malnutrition    6. Decreased appetite        Chief Complaint: Lung Cancer      Oncologic History:      Oncologic History Non-small cell lung cancer diagnosed 6/2016   Recurrent disease to lung 5/2017    Oncologic Treatment Concurrent chemoXRT with weekly carboplatin and paclitaxel, XRT to a dose of 5840 cGy 10/31/2016  Carboplatin/gemcitabine 6/2/2017 - 7/2017 (discontinued due to progression)  Ipilimumab plus nivolumab  clinical trial 8/14/17     Pathology Squamous cell carcinoma, T3, N2, M0 (6/2016), PD-L1 TPS 1%        Subjective:    Interval History: Mr. Salazar is a 67 y.o. male who is seen in follow-up for lung cancer.  He states that his cough has improved.  Still has some ongoing pain is not relieved with morphine and Percocet.  He has ongoing shortness of breath and awaiting oxygen.  Is also not noted an increase in his appetite despite the use of Marinol or Periactin.  No other new complaints.      He has previously seen Dr. Ubaldo Thomason at [facility].  His history dates to 6/2016 when he sought medical attention for shortness of breath and cough.  He was found to have a large lung mass in the right upper lobe with mediastinal lymphadenopathy.  He underwent subsequent workup, however, symptoms worsened and progressed to him having a cardiac arrest requiring CPR and prolonged ICU stay.  Eventually a biopsy was performed showing squamous cell lung cancer.  This was initially staged as a T3, N2.  He was treated with concurrent chemotherapy and radiation until 10/31/16.  He is an on surveillance since this time.  His insurance changed and is now following and [facility].  Repeat PET/CT had shown locally recurrent disease.  MRI of the brain was negative.  He was started on  carboplatin and gemcitabine on 6/2/17.  CT on 7/19/17 and had indicated progressive disease.  He was started on ipilimumab and nivolumab on a clinical trial on 8/14/17.  CT in 9/2017 had revealed stable disease.      Past Medical History:   Past Medical History:   Diagnosis Date    Cancer associated pain 8/28/2017    Centrilobular emphysema 9/11/2017    Chemotherapy follow-up examination 10/23/2017    Decreased appetite 10/23/2017    Exam for clinical trial 8/9/2017    History of cardiac arrest     Hyperlipidemia     Hypertension     Mediastinal lymphadenopathy 5/24/2017    Primary cancer of right lung 4/6/2017    Protein calorie malnutrition 9/11/2017    Recurrent non-small cell lung cancer 5/24/2017       Past Surgical HIstory:   Past Surgical History:   Procedure Laterality Date    HERNIA REPAIR         Family History:   Family History   Problem Relation Age of Onset    Cancer Mother        Social History:  reports that he has quit smoking. His smoking use included Cigarettes. He has a 72.00 pack-year smoking history. He has never used smokeless tobacco. He reports that he drinks about 10.2 oz of alcohol per week . He reports that he does not use drugs.    Allergies:  Review of patient's allergies indicates:  No Known Allergies    Medications:  Current Outpatient Prescriptions   Medication Sig Dispense Refill    albuterol 90 mcg/actuation inhaler Inhale 2 puffs into the lungs every 6 (six) hours as needed for Wheezing. 1 Inhaler 0    albuterol-ipratropium 2.5mg-0.5mg/3mL (DUO-NEB) 0.5 mg-3 mg(2.5 mg base)/3 mL nebulizer solution Take 3 mLs by nebulization every 4 (four) hours as needed for Wheezing. 20 vial 0    aspirin (ECOTRIN) 81 MG EC tablet Take 81 mg by mouth.      atorvastatin (LIPITOR) 40 MG tablet Take 40 mg by mouth.      benzonatate (TESSALON) 100 MG capsule Take 100 mg by mouth.      carvedilol (COREG) 3.125 MG tablet TAKE 1 TABLET BY MOUTH TWICE DAILY 180 tablet 0    carvedilol  (COREG) 3.125 MG tablet TAKE 1 TABLET BY MOUTH TWICE DAILY 60 tablet 0    cyproheptadine (PERIACTIN) 4 mg tablet Take 1 tablet (4 mg total) by mouth 3 (three) times daily. 90 tablet 2    dronabinol (MARINOL) 5 MG capsule Take 1 capsule (5 mg total) by mouth 2 (two) times daily before meals. 60 capsule 0    fluticasone (FLOVENT HFA) 110 mcg/actuation inhaler Inhale 2 puffs into the lungs 2 (two) times daily. Controller 12 g 0    guaifenesin-codeine 100-10 mg/5 ml (TUSSI-ORGANIDIN NR)  mg/5 mL syrup Take 5 mLs by mouth 3 (three) times daily as needed for Cough.      lisinopril (PRINIVIL,ZESTRIL) 2.5 MG tablet Take 2.5 mg by mouth.      magnesium hydroxide 400 mg/5 ml (MILK OF MAGNESIA) 400 mg/5 mL Susp Take 30 mLs by mouth daily as needed.      ondansetron (ZOFRAN, AS HYDROCHLORIDE,) 4 MG tablet Take 1 tablet (4 mg total) by mouth every 8 (eight) hours as needed for Nausea. 60 tablet 1    oxycodone-acetaminophen (PERCOCET)  mg per tablet Take 1 tablet by mouth every 4 (four) hours as needed for Pain. 90 tablet 0    tiotropium (SPIRIVA) 18 mcg inhalation capsule Inhale 1 capsule (18 mcg total) into the lungs once daily. 30 capsule 1    megestrol (MEGACE) 400 mg/10 mL (40 mg/mL) Susp Take 10 mLs (400 mg total) by mouth once daily. 480 mL 11    morphine (MS CONTIN) 30 MG 12 hr tablet Take 1 tablet (30 mg total) by mouth 2 (two) times daily. 60 tablet 0     No current facility-administered medications for this visit.        Review of Systems   Constitutional: Positive for appetite change. Negative for chills, fatigue, fever and unexpected weight change.   HENT: Negative for dental problem, sinus pressure and sneezing.    Eyes: Negative for visual disturbance.   Respiratory: Positive for cough and shortness of breath. Negative for choking and chest tightness.    Cardiovascular: Positive for chest pain. Negative for leg swelling.   Gastrointestinal: Negative for abdominal pain, blood in stool,  constipation, diarrhea and nausea.   Genitourinary: Negative for difficulty urinating and dysuria.   Musculoskeletal: Negative for arthralgias and back pain.   Skin: Negative for rash and wound.   Neurological: Negative for dizziness, light-headedness and headaches.   Hematological: Negative for adenopathy. Does not bruise/bleed easily.   Psychiatric/Behavioral: Negative for sleep disturbance. The patient is not nervous/anxious.        ECOG Performance Status:   ECOG SCORE    1 - Restricted in strenuous activity-ambulatory and able to carry out work of a light nature         Objective:      Vitals:   Vitals:    10/23/17 0905   BP: 115/75   BP Location: Right arm   Patient Position: Sitting   BP Method: Medium (Automatic)   Pulse: 108   Resp: 18   Temp: 97.9 °F (36.6 °C)   TempSrc: Oral   SpO2: 99%   Weight: 51 kg (112 lb 7 oz)     BMI: Body mass index is 19.92 kg/m².    Physical Exam   Constitutional: He is oriented to person, place, and time. He appears well-developed and well-nourished.   HENT:   Head: Normocephalic and atraumatic.   Eyes: Pupils are equal, round, and reactive to light.   Neck: Normal range of motion. Neck supple.   Cardiovascular: Normal rate and regular rhythm.    Pulmonary/Chest: Effort normal and breath sounds normal. No respiratory distress.   Abdominal: Soft. He exhibits no distension. There is no tenderness.   Musculoskeletal: He exhibits no edema or tenderness.   Lymphadenopathy:     He has no cervical adenopathy.   Neurological: He is alert and oriented to person, place, and time. No cranial nerve deficit.   Skin: Skin is warm and dry.   Psychiatric: He has a normal mood and affect. His behavior is normal.       Laboratory Data:  Lab Visit on 10/23/2017   Component Date Value Ref Range Status    WBC 10/23/2017 9.09  3.90 - 12.70 K/uL Final    RBC 10/23/2017 3.73* 4.60 - 6.20 M/uL Final    Hemoglobin 10/23/2017 10.1* 14.0 - 18.0 g/dL Final    Hematocrit 10/23/2017 31.3* 40.0 - 54.0 %  Final    MCV 10/23/2017 84  82 - 98 fL Final    MCH 10/23/2017 27.1  27.0 - 31.0 pg Final    MCHC 10/23/2017 32.3  32.0 - 36.0 g/dL Final    RDW 10/23/2017 14.3  11.5 - 14.5 % Final    Platelets 10/23/2017 490* 150 - 350 K/uL Final    MPV 10/23/2017 9.3  9.2 - 12.9 fL Final    Immature Granulocytes 10/23/2017 0.3  0.0 - 0.5 % Final    Gran # 10/23/2017 6.1  1.8 - 7.7 K/uL Final    Immature Grans (Abs) 10/23/2017 0.03  0.00 - 0.04 K/uL Final    Lymph # 10/23/2017 1.5  1.0 - 4.8 K/uL Final    Mono # 10/23/2017 1.1* 0.3 - 1.0 K/uL Final    Eos # 10/23/2017 0.3  0.0 - 0.5 K/uL Final    Baso # 10/23/2017 0.02  0.00 - 0.20 K/uL Final    nRBC 10/23/2017 0  0 /100 WBC Final    Gran% 10/23/2017 67.3  38.0 - 73.0 % Final    Lymph% 10/23/2017 16.8* 18.0 - 48.0 % Final    Mono% 10/23/2017 12.3  4.0 - 15.0 % Final    Eosinophil% 10/23/2017 3.1  0.0 - 8.0 % Final    Basophil% 10/23/2017 0.2  0.0 - 1.9 % Final    Differential Method 10/23/2017 Automated   Final         Imaging:    CT 9/22/17  CT CHEST, ABDOMEN, and, PELVIS  with IV contrast    Protocol:  Axial images of the chest, abdomen, and pelvis were acquired  after the use of 75 cc [id number] IV contrast.  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     COMPARISON: CT 07/19/2017     FINDINGS:    Thoracic chest wall soft tissues: No significant abnormality.     Aorta: Normal in course and caliber, with mild atherosclerotic plaque. There are three branching vessels at the arch. There is moderate coronary artery atherosclerotic calcification.      Heart: Normal in size.  There is a stable small pericardial effusion.     Anna Marie/Mediastinum: No significant lymphadenopathy     Lungs: There is redemonstration of RIGHT pleural thickening and trace RIGHT pleural fluid.  There is a soft tissue mass in the anterior RIGHT upper lobe measuring 4.7 cm maximally (axial series 2  image 18) which abuts the RIGHT paramediastinal border and SVC without focal narrowing.  There is associated volume loss in the RIGHT upper lobe and distortion with patent air bronchograms.  Soft tissue mass at the RIGHT lower lobe measures 3.8 cm maximally with central low attenuation suggesting necrosis (axial series 2 image 35).  At the RIGHT diaphragmatic marichuy there is a 3.4 cm soft tissue mass with central heterogenous attenuation which abuts the RIGHT kidney and RIGHT neural foramen at the T11-T12 level (axial series 2 image 50, previously 2.1 cm). Right lung also has patchy density in the middle and  upper zones suggesting radiation therapy change. The anterior LEFT upper lobe demonstrates a fibrocalcific scar and small bleb.  LEFT apical bleb is also noted.  There is moderate emphysema.    Liver: Normal in size and attenuation, with no focal hepatic lesions.       Gallbladder: There is cholelithiasis.     Bile Ducts: No evidence of dilated ducts.     Pancreas: No mass or peripancreatic fat stranding. Chronic pancreatic head calcifications are again noted likely related to chronic pancreatitis.     Spleen: Normal.     Adrenals: Normal.     GI Tract/Mesentery: No evidence of bowel obstruction or inflammation.     Kidneys/ Ureters: Normal in size and location.No hydronephrosis or nephrolithiasis. No ureteral dilatation.     Bladder: There is diffuse thickening of the bladder walls.     Reproductive organs: Normal.     Retroperitoneum:  No significant adenopathy.      Peritoneal Space: Mild ascites. No free air.     Abdominal wall:  There is a RIGHT inguinal hernia containing a portion of the anterior bladder and a loop of bowel.      Vasculature: There is severe aortobiiliac atherosclerotic calcification with   significant stenosis at the origin of both common iliac arteries and threadlike flow through the RIGHT common femoral artery.     Bones: No acute fracture.  There are healing changes of a   midsternal fracture and bilateral anterior rib fractures, similar to prior, incompletely healed. No osseous lytic or blastic lesion.  Postoperative changes of a intramedullary vesna and screw in the RIGHT femur are again noted.   Impression          Interval enlargement of masses at the right lower lobe and right diaphragmatic marichuy compatible with malignancy.    Severe aortobiiliac atherosclerotic plaque with likely hemodynamically significant stenosis at the origin of both common iliac arteries and   the right common femoral artery.    Diffuse bladder wall thickening which can be seen in chronic bladder outlet obstruction or cystitis.  Correlate with urinalysis.  Note that a portion of the anterolateral bladder margin herniates into a right inguinal hernia which also contains a loop of small bowel without obstruction.    Small pericardial effusion.    Multiple incompletely healed fractues of the anterior chest wall.     Cholelithiasis. Pancreatic calcifications consistent with chronic calcific pancreatitis.    Additional stable findings as detailed above.    RECIST SUMMARY: Date of prior examination for comparison 07/19/2017  Right upper lobe mass measures 4.7 cm maximally (axial series 2 image 18), previously 5.6 cm  Right lower lobe mass measures 3.8 cm maximally (axial series 2/75), previously 2.8 cm  Diaphragmatic marichuy mass measures 3.4 cm maximally (axial series 2 image 50), previously 2.1 cm    This report has been flagged in the [facility] medical record.               Assessment:       1. Recurrent non-small cell lung cancer    2. Exam for clinical trial    3. Chemotherapy follow-up examination    4. Cancer associated pain    5. Moderate protein-calorie malnutrition    6. Decreased appetite           Plan:     Mr. Salazar will continue on with treatment per protocol.  Plan to check scans on him next week.  He is still losing weight despite Marinol and Periactin.  I will change him over to Megace at this point.  I  have told him this may increase the risk of blood clots.  He is agreeable to proceed.  I will also increase his morphine to 30 mg every 12 hours.  Follow back up with me in 2 weeks.    Gomez Garsia DO, FACP  Hematology & Oncology  [facility] [place] [address]  ph. [phone]  Fax. [phone]

## 2017-10-23 NOTE — PROGRESS NOTES
"Protocol:  Lung MAP  Sub study:  I  Arm 1- Nivolumab/Ipilumumab  Sub Investigator: Gomez Garsia DO  Treating Physician: Gomez Garsia DO  Pt Initials: KEYLA RAMIRES  Patient ID: [id number]  IRB#: [id number]      October 23, 2017    Cycle 6 Nivolumab    Patient seen in clinic today for cycle 6 of Nivolumab. Patient is AAOx 3 and states willingness to continue participation in study. PRO questionnaire completed by patient. He states that his cough is a little better, but he still gets "short winded" when he exerts himself. He was seen by pulmonology and is set up to receive home oxygen therapy. His pain has not been well managed. His wife said that she stopped giving him the MS Contin a week ago because it was making him sleep all day. Dr. Garsia will increase Percocet  mg to one or two tablets every 4-6 hours PRN.  He no longer has any complaints of constipation since starting stool softeners daily,and he has added in Milk of Magnesia as needed. Encouraged patient to continue taking stool softeners since he will be increasing pain medication. Patient's appetite remains poor and he has lost some more weight. He is taking Marinol and Periactin as prescribed and was seen by dietician, but this has not helped. Dr. Garsia discontinued Marinol and Periactin and prescribed Megace. His activity remains the same since last visit. His ECOG is 1 per Dr. Garsia. He did mention that his left hip is starting to get sore and discolored from laying on his left side. He is unable to lay on his right side because of the pain. Encouraged patient to turn from back to side at least every two hours if lying in bed and avoid lying on bony prominences. Recommended waffle cushion or pillow. Will reassess next visit. Labs reviewed and assessment completed by Dr. Garsia. Patient's calcium is 10.3 mg/dl this visit and we will continue to monitor. His magnesium level is slightly low at 1.4 mg/dl. Patient will receive 2 gm of magnesium " sulfate IVPB in infusion suite today. All labs were reviewed with Dr. Garsia. These labs are indicative of his diet and not his lung cancer per Dr. Garsia. Ok to proceed with Nivolumab today. Discussed s/s of infusion related reaction. Instructed to notify clinical research nurse and physician if he has to seek medical attention prior to our next visit. Instructed to call physician and myself for any questions or concerns. Phone numbers and cycle 7 appointments for CT scans, labs, MD appt, and chemo given to patient and wife. Weight has not changed by 10% therefore Nivolumab dose will remain 162 mg.     Weight- 51 kg  Height- 157.5m  BSA- 1.49 m2  BP- 115/75  HR- 108  RR- 18  T- 97.9F  O2- 99%      AEs     Anemia Grade 2-  Chronic NCS. Intermittent. Grade 1 noted today.  Anorexia Grade 2- Appetite remains poor. Marinol and Periactin discontinued and Megace prescribed.    Cough Grade 2- Chronic cough. Patient states that cough has slightly improved.  Ongoing  Dyspnea Grade 2- No changes still grade 2. Relieved at rest. Patient was seen by pulmonologist and he is still waiting on home oxygen. Phone number provided to follow up.  Fatigue Grade 1- Relieved with rest per patient.  Generalized muscle weakness Grade 1- weakness perceived by patient and not evident on exam  Hyponatremia Grade 3- Ongoing and no interventions taken. NCS per Dr. Garsia  Non cardiac chest pain Grade 2- Patient has history of cracked ribs and sternum. Occasional pain. Patient was not taking Morphine because it made him sleep too much. Percocet  mg increased to 1-2 tabs Q 4-6 hours PRN  Pain Grade 2-  Pain to flank. Patient has history of cracked ribs and sternum.Percocet  mg increased to 1-2 tabs Q 4-6 hours PRN  Paresthesia Grade 1- Occasional tingling and cold sensation to fingertips. Does not interfere with ADLs.   Sinus tachycardia Grade 1- Asymptomatic. This was a baseline issue. No intervention indicated.  Weight loss Grade  1- 5 to <10% from baseline; intervention not indicated. Remains a grade 1. Appetite remains poor. Patient will try Megace  Wheezing Grade 2- Relieved by nebulizers and inhalers. Occasional no changes reported.   Hypercalcemia Grade 1- NCS. Dr. Garsia noted. No intervention. Will continue to monitor  Hypoalbuminemia Grade 1- indication of poor diet per Dr. Garsia. Encouraged patient to increase protein intake.  Hypomagnesemia Grade 1- Magnesium level 1.4. Patient will receive 2 grams of magnesium sulfate IVPB in infusion suite.

## 2017-11-03 ENCOUNTER — HOSPITAL ENCOUNTER (OUTPATIENT)
Dept: RADIOLOGY | Facility: HOSPITAL | Age: 67
Discharge: HOME | End: 2017-11-03
Attending: INTERNAL MEDICINE
Payer: MEDICARE

## 2017-11-03 DIAGNOSIS — Z00.6 EXAMINATION OF PARTICIPANT IN CLINICAL TRIAL: ICD-10-CM

## 2017-11-03 DIAGNOSIS — C34.90 RECURRENT NON-SMALL CELL LUNG CANCER: ICD-10-CM

## 2017-11-03 PROCEDURE — 74177 CT ABD & PELVIS W/CONTRAST: CPT | Mod: TC

## 2017-11-03 PROCEDURE — 25500020 PHARM REV CODE 255: Performed by: INTERNAL MEDICINE

## 2017-11-03 PROCEDURE — 71260 CT THORAX DX C+: CPT | Mod: 26,,, | Performed by: RADIOLOGY

## 2017-11-03 PROCEDURE — 74177 CT ABD & PELVIS W/CONTRAST: CPT | Mod: 26,,, | Performed by: RADIOLOGY

## 2017-11-03 PROCEDURE — 71260 CT THORAX DX C+: CPT | Mod: TC

## 2017-11-03 RX ADMIN — IOHEXOL 15 ML: 350 INJECTION, SOLUTION INTRAVENOUS at 04:11

## 2017-11-03 RX ADMIN — IOHEXOL 75 ML: 350 INJECTION, SOLUTION INTRAVENOUS at 04:11

## 2017-11-06 ENCOUNTER — INFUSION (OUTPATIENT)
Dept: INFUSION THERAPY | Facility: HOSPITAL | Age: 67
End: 2017-11-06
Attending: INTERNAL MEDICINE
Payer: MEDICARE

## 2017-11-06 ENCOUNTER — OFFICE VISIT (OUTPATIENT)
Dept: HEMATOLOGY/ONCOLOGY | Facility: CLINIC | Age: 67
End: 2017-11-06
Payer: MEDICARE

## 2017-11-06 ENCOUNTER — RESEARCH ENCOUNTER (OUTPATIENT)
Dept: RESEARCH | Facility: HOSPITAL | Age: 67
End: 2017-11-06

## 2017-11-06 ENCOUNTER — TELEPHONE (OUTPATIENT)
Dept: PHARMACY | Facility: CLINIC | Age: 67
End: 2017-11-06

## 2017-11-06 VITALS
SYSTOLIC BLOOD PRESSURE: 110 MMHG | DIASTOLIC BLOOD PRESSURE: 78 MMHG | TEMPERATURE: 98 F | HEIGHT: 62 IN | WEIGHT: 114 LBS | OXYGEN SATURATION: 96 % | BODY MASS INDEX: 20.98 KG/M2 | RESPIRATION RATE: 14 BRPM | HEART RATE: 106 BPM

## 2017-11-06 VITALS
HEART RATE: 99 BPM | TEMPERATURE: 98 F | DIASTOLIC BLOOD PRESSURE: 70 MMHG | SYSTOLIC BLOOD PRESSURE: 111 MMHG | RESPIRATION RATE: 20 BRPM

## 2017-11-06 DIAGNOSIS — J44.9 CHRONIC OBSTRUCTIVE PULMONARY DISEASE, UNSPECIFIED COPD TYPE: Primary | ICD-10-CM

## 2017-11-06 DIAGNOSIS — Z00.6 EXAM FOR CLINICAL TRIAL: ICD-10-CM

## 2017-11-06 DIAGNOSIS — C34.91 PRIMARY CANCER OF RIGHT LUNG: Primary | ICD-10-CM

## 2017-11-06 DIAGNOSIS — C34.90 RECURRENT NON-SMALL CELL LUNG CANCER: ICD-10-CM

## 2017-11-06 DIAGNOSIS — G89.3 CANCER ASSOCIATED PAIN: ICD-10-CM

## 2017-11-06 DIAGNOSIS — R06.02 SOB (SHORTNESS OF BREATH): ICD-10-CM

## 2017-11-06 DIAGNOSIS — E83.52 HYPERCALCEMIA: Primary | ICD-10-CM

## 2017-11-06 DIAGNOSIS — E83.52 HYPERCALCEMIA: ICD-10-CM

## 2017-11-06 PROCEDURE — 99999 PR PBB SHADOW E&M-EST. PATIENT-LVL IV: CPT | Mod: PBBFAC,,, | Performed by: INTERNAL MEDICINE

## 2017-11-06 PROCEDURE — 63600175 PHARM REV CODE 636 W HCPCS: Performed by: INTERNAL MEDICINE

## 2017-11-06 PROCEDURE — 96365 THER/PROPH/DIAG IV INF INIT: CPT

## 2017-11-06 PROCEDURE — 99215 OFFICE O/P EST HI 40 MIN: CPT | Mod: S$GLB,,, | Performed by: INTERNAL MEDICINE

## 2017-11-06 PROCEDURE — 25000003 PHARM REV CODE 250: Performed by: INTERNAL MEDICINE

## 2017-11-06 RX ORDER — TIOTROPIUM BROMIDE 18 UG/1
18 CAPSULE ORAL; RESPIRATORY (INHALATION) DAILY
Qty: 30 CAPSULE | Refills: 1 | Status: SHIPPED | OUTPATIENT
Start: 2017-11-06

## 2017-11-06 RX ORDER — HEPARIN 100 UNIT/ML
500 SYRINGE INTRAVENOUS
Status: CANCELLED | OUTPATIENT
Start: 2017-11-06

## 2017-11-06 RX ORDER — SODIUM CHLORIDE 0.9 % (FLUSH) 0.9 %
10 SYRINGE (ML) INJECTION
Status: DISCONTINUED | OUTPATIENT
Start: 2017-11-06 | End: 2017-11-06 | Stop reason: HOSPADM

## 2017-11-06 RX ORDER — SODIUM CHLORIDE 0.9 % (FLUSH) 0.9 %
10 SYRINGE (ML) INJECTION
Status: CANCELLED | OUTPATIENT
Start: 2017-11-06

## 2017-11-06 RX ADMIN — SODIUM CHLORIDE: 9 INJECTION, SOLUTION INTRAVENOUS at 11:11

## 2017-11-06 RX ADMIN — SODIUM CHLORIDE 4 MG: 9 INJECTION, SOLUTION INTRAVENOUS at 11:11

## 2017-11-06 NOTE — TELEPHONE ENCOUNTER
[facility] received a prescription for Gilotrif and it will require a prior authorization with their insurance company. We will update patient of status as more information is received.

## 2017-11-06 NOTE — PROGRESS NOTES
PATIENT: Karsten Salazar  MRN: [id number]  DATE: 11/6/2017      Diagnosis:   1. Primary cancer of right lung    2. Recurrent non-small cell lung cancer    3. Cancer associated pain    4. Exam for clinical trial    5. Hypercalcemia    6. SOB (shortness of breath)        Chief Complaint: Malignant neoplasm of lung, unspecified laterality, unspecif      Oncologic History:      Oncologic History Non-small cell lung cancer diagnosed 6/2016   Recurrent disease to lung 5/2017    Oncologic Treatment Concurrent chemoXRT with weekly carboplatin and paclitaxel, XRT to a dose of 5840 cGy 10/31/2016  Carboplatin/gemcitabine 6/2/2017 - 7/2017 (discontinued due to progression)  Ipilimumab plus nivolumab  clinical trial 8/14/17 - 11/2017 (discontinued due to progression)    Pathology Squamous cell carcinoma, T3, N2, M0 (6/2016), PD-L1 TPS 1%        Subjective:    Interval History: Mr. Salazar is a 67 y.o. male who is seen in follow-up for lung cancer.  He states that overall he is been feeling better. His breathing, cough, pain have all improved.  He still limited in activity with shortness of breath and has yet to receive oxygen.  His weight has stabilized and he is eating more.  He has no other new complaints.    He has previously seen Dr. Ubaldo Thomason at [facility].  His history dates to 6/2016 when he sought medical attention for shortness of breath and cough.  He was found to have a large lung mass in the right upper lobe with mediastinal lymphadenopathy.  He underwent subsequent workup, however, symptoms worsened and progressed to him having a cardiac arrest requiring CPR and prolonged ICU stay.  Eventually a biopsy was performed showing squamous cell lung cancer.  This was initially staged as a T3, N2.  He was treated with concurrent chemotherapy and radiation until 10/31/16.  He is an on surveillance since this time.  His insurance changed and is now following and [facility].  Repeat PET/CT had shown locally recurrent disease.   MRI of the brain was negative.  He was started on carboplatin and gemcitabine on 6/2/17.  CT on 7/19/17 and had indicated progressive disease.  He was started on ipilimumab and nivolumab on a clinical trial on 8/14/17.  CT in 9/2017 had revealed stable disease. CT in 11/2017 have demonstrated progressive disease and he was taken off immunotherapy.      Past Medical History:   Past Medical History:   Diagnosis Date    Cancer associated pain 8/28/2017    Centrilobular emphysema 9/11/2017    Chemotherapy follow-up examination 10/23/2017    Decreased appetite 10/23/2017    Exam for clinical trial 8/9/2017    History of cardiac arrest     Hypercalcemia 11/6/2017    Hyperlipidemia     Hypertension     Mediastinal lymphadenopathy 5/24/2017    Primary cancer of right lung 4/6/2017    Protein calorie malnutrition 9/11/2017    Recurrent non-small cell lung cancer 5/24/2017       Past Surgical HIstory:   Past Surgical History:   Procedure Laterality Date    HERNIA REPAIR         Family History:   Family History   Problem Relation Age of Onset    Cancer Mother        Social History:  reports that he has quit smoking. His smoking use included Cigarettes. He has a 72.00 pack-year smoking history. He has never used smokeless tobacco. He reports that he drinks about 10.2 oz of alcohol per week . He reports that he does not use drugs.    Allergies:  Review of patient's allergies indicates:  No Known Allergies    Medications:  Current Outpatient Prescriptions   Medication Sig Dispense Refill    albuterol 90 mcg/actuation inhaler Inhale 2 puffs into the lungs every 6 (six) hours as needed for Wheezing. 1 Inhaler 0    albuterol-ipratropium 2.5mg-0.5mg/3mL (DUO-NEB) 0.5 mg-3 mg(2.5 mg base)/3 mL nebulizer solution Take 3 mLs by nebulization every 4 (four) hours as needed for Wheezing. 20 vial 0    aspirin (ECOTRIN) 81 MG EC tablet Take 81 mg by mouth.      atorvastatin (LIPITOR) 40 MG tablet Take 40 mg by mouth.       benzonatate (TESSALON) 100 MG capsule Take 100 mg by mouth.      carvedilol (COREG) 3.125 MG tablet TAKE 1 TABLET BY MOUTH TWICE DAILY 180 tablet 0    carvedilol (COREG) 3.125 MG tablet TAKE 1 TABLET BY MOUTH TWICE DAILY 60 tablet 0    cyproheptadine (PERIACTIN) 4 mg tablet Take 1 tablet (4 mg total) by mouth 3 (three) times daily. 90 tablet 2    dronabinol (MARINOL) 5 MG capsule Take 1 capsule (5 mg total) by mouth 2 (two) times daily before meals. 60 capsule 0    fluticasone (FLOVENT HFA) 110 mcg/actuation inhaler Inhale 2 puffs into the lungs 2 (two) times daily. Controller 12 g 0    guaifenesin-codeine 100-10 mg/5 ml (TUSSI-ORGANIDIN NR)  mg/5 mL syrup Take 5 mLs by mouth 3 (three) times daily as needed for Cough.      lisinopril (PRINIVIL,ZESTRIL) 2.5 MG tablet Take 2.5 mg by mouth.      magnesium hydroxide 400 mg/5 ml (MILK OF MAGNESIA) 400 mg/5 mL Susp Take 30 mLs by mouth daily as needed.      megestrol (MEGACE) 400 mg/10 mL (40 mg/mL) Susp Take 10 mLs (400 mg total) by mouth once daily. 480 mL 11    ondansetron (ZOFRAN, AS HYDROCHLORIDE,) 4 MG tablet Take 1 tablet (4 mg total) by mouth every 8 (eight) hours as needed for Nausea. 60 tablet 1    oxycodone-acetaminophen (PERCOCET)  mg per tablet Take 1 tablet by mouth every 4 (four) hours as needed for Pain. 90 tablet 0    tiotropium (SPIRIVA) 18 mcg inhalation capsule Inhale 1 capsule (18 mcg total) into the lungs once daily. 30 capsule 1    afatinib 40 mg Tab Take 40 mg by mouth once daily. 30 tablet 6     No current facility-administered medications for this visit.        Review of Systems   Constitutional: Negative for appetite change, chills, fatigue, fever and unexpected weight change.   HENT: Negative for dental problem, sinus pressure and sneezing.    Eyes: Negative for visual disturbance.   Respiratory: Positive for cough and shortness of breath. Negative for choking and chest tightness.    Cardiovascular: Positive for chest  "pain. Negative for leg swelling.   Gastrointestinal: Negative for abdominal pain, blood in stool, constipation, diarrhea and nausea.   Genitourinary: Negative for difficulty urinating and dysuria.   Musculoskeletal: Negative for arthralgias and back pain.   Skin: Negative for rash and wound.   Neurological: Negative for dizziness, light-headedness and headaches.   Hematological: Negative for adenopathy. Does not bruise/bleed easily.   Psychiatric/Behavioral: Negative for sleep disturbance. The patient is not nervous/anxious.        ECOG Performance Status:   ECOG SCORE    2 - Capable of all selfcare but unable to carry out any work activities, active > 50% of hours         Objective:      Vitals:   Vitals:    11/06/17 0923   BP: 110/78   BP Location: Right arm   Patient Position: Sitting   BP Method: Medium (Automatic)   Pulse: 106   Resp: 14   Temp: 97.8 °F (36.6 °C)   TempSrc: Oral   SpO2: 96%   Weight: 51.7 kg (113 lb 15.7 oz)   Height: 5' 2" (1.575 m)     BMI: Body mass index is 20.85 kg/m².    Physical Exam   Constitutional: He is oriented to person, place, and time. He appears well-developed and well-nourished.   HENT:   Head: Normocephalic and atraumatic.   Eyes: Pupils are equal, round, and reactive to light.   Neck: Normal range of motion. Neck supple.   Cardiovascular: Normal rate and regular rhythm.    Pulmonary/Chest: Effort normal and breath sounds normal. No respiratory distress.   Abdominal: Soft. He exhibits no distension. There is no tenderness.   Musculoskeletal: He exhibits no edema or tenderness.   Lymphadenopathy:     He has no cervical adenopathy.   Neurological: He is alert and oriented to person, place, and time. No cranial nerve deficit.   Skin: Skin is warm and dry.   Psychiatric: He has a normal mood and affect. His behavior is normal.       Laboratory Data:  Lab Visit on 11/06/2017   Component Date Value Ref Range Status    Sodium 11/06/2017 126* 136 - 145 mmol/L Final    Potassium " 11/06/2017 4.4  3.5 - 5.1 mmol/L Final    Chloride 11/06/2017 94* 95 - 110 mmol/L Final    CO2 11/06/2017 27  23 - 29 mmol/L Final    Glucose 11/06/2017 127* 70 - 110 mg/dL Final    BUN, Bld 11/06/2017 12  8 - 23 mg/dL Final    Creatinine 11/06/2017 0.9  0.5 - 1.4 mg/dL Final    Calcium 11/06/2017 11.5* 8.7 - 10.5 mg/dL Final    Total Protein 11/06/2017 9.4* 6.0 - 8.4 g/dL Final    Albumin 11/06/2017 2.9* 3.5 - 5.2 g/dL Final    Total Bilirubin 11/06/2017 0.3  0.1 - 1.0 mg/dL Final    Comment: For infants and newborns, interpretation of results should be based  on gestational age, weight and in agreement with clinical  observations.  Premature Infant recommended reference ranges:  Up to 24 hours.............<8.0 mg/dL  Up to 48 hours............<12.0 mg/dL  3-5 days..................<15.0 mg/dL  6-29 days.................<15.0 mg/dL      Alkaline Phosphatase 11/06/2017 86  55 - 135 U/L Final    AST 11/06/2017 14  10 - 40 U/L Final    ALT 11/06/2017 17  10 - 44 U/L Final    Anion Gap 11/06/2017 5* 8 - 16 mmol/L Final    eGFR if African American 11/06/2017 >60.0  >60 mL/min/1.73 m^2 Final    eGFR if non African American 11/06/2017 >60.0  >60 mL/min/1.73 m^2 Final    Comment: Calculation used to obtain the estimated glomerular filtration  rate (eGFR) is the CKD-EPI equation.       Magnesium 11/06/2017 1.7  1.6 - 2.6 mg/dL Final    WBC 11/06/2017 10.00  3.90 - 12.70 K/uL Final    RBC 11/06/2017 3.80* 4.60 - 6.20 M/uL Final    Hemoglobin 11/06/2017 9.9* 14.0 - 18.0 g/dL Final    Hematocrit 11/06/2017 30.9* 40.0 - 54.0 % Final    MCV 11/06/2017 81* 82 - 98 fL Final    MCH 11/06/2017 26.1* 27.0 - 31.0 pg Final    MCHC 11/06/2017 32.0  32.0 - 36.0 g/dL Final    RDW 11/06/2017 15.2* 11.5 - 14.5 % Final    Platelets 11/06/2017 561* 150 - 350 K/uL Final    MPV 11/06/2017 9.2  9.2 - 12.9 fL Final    Immature Granulocytes 11/06/2017 0.5  0.0 - 0.5 % Final    Gran # 11/06/2017 7.5  1.8 - 7.7 K/uL Final     Immature Grans (Abs) 11/06/2017 0.05* 0.00 - 0.04 K/uL Final    Lymph # 11/06/2017 1.3  1.0 - 4.8 K/uL Final    Mono # 11/06/2017 1.0  0.3 - 1.0 K/uL Final    Eos # 11/06/2017 0.1  0.0 - 0.5 K/uL Final    Baso # 11/06/2017 0.01  0.00 - 0.20 K/uL Final    nRBC 11/06/2017 0  0 /100 WBC Final    Gran% 11/06/2017 75.4* 38.0 - 73.0 % Final    Lymph% 11/06/2017 13.2* 18.0 - 48.0 % Final    Mono% 11/06/2017 9.6  4.0 - 15.0 % Final    Eosinophil% 11/06/2017 1.2  0.0 - 8.0 % Final    Basophil% 11/06/2017 0.1  0.0 - 1.9 % Final    Differential Method 11/06/2017 Automated   Final    Lipase 11/06/2017 25  4 - 60 U/L Final    Amylase 11/06/2017 78  20 - 110 U/L Final    TSH 11/06/2017 3.161  0.400 - 4.000 uIU/mL Final    T3, Free 11/06/2017 1.7* 2.3 - 4.2 pg/mL Final    Free T4 11/06/2017 1.41  0.71 - 1.51 ng/dL Final         Imaging:  CT 11/3/17  CT CHEST, ABDOMEN, and, PELVIS  with IV contrast    Protocol:  Axial CT images of the chest, abdomen, and pelvis were acquired  after the use of 75 cc [id number] IV contrast.  Coronal and sagittal reconstructions were acquired.    HISTORY:  67 year old M with Clinical trial disease assessment.     COMPARISON: CT chest abdomen pelvis 09/22/2017     FINDINGS:  Thoracic soft tissues: No significant abnormality.     Aorta: Normal in course and caliber, without significant atherosclerotic plaque. There are three branching vessels at the arch.       Heart: Stable small pericardial effusion     Anna Marie/Mediastinum: No significant lymphadenopathy     Lungs: Again demonstrated is right-sided pleural thickening and right pleural fluid.  There is a soft tissue mass in the right anterior upper lobe measuring 4.1 in axial dimension by 2.8 cm (series 2, image 20).  There is a soft tissue mass in the right lower lobe that now measures 4.9 CM (series 2, image 36).  In the right lower lung region at the diaphragmatic marichuy there is a 4.7 cm lesion (series 2,  51).  There are stable  radiation changes in the right middle and lower lung fields.  Moderate emphysema is again noted.     Liver: Normal in size and attenuation, with no focal hepatic lesions.       Gallbladder: There is cholelithiasis.     Bile Ducts: No evidence of dilated ducts.     Pancreas: Pancreatic head calcifications are noted.      Spleen: Unremarkable.     Adrenals: Unremarkable.     Kidneys/ Ureters: Normal in size and location. Normal concentration and excretion of contrast. No hydronephrosis or nephrolithiasis. No ureteral dilatation.     Bladder: No evidence of wall thickening.     Reproductive organs: Unremarkable.     GI Tract/Mesentery: No evidence of bowel obstruction or inflammation.     Peritoneal Space: No ascites. No free air.     Retroperitoneum:  No significant adenopathy.      Abdominal wall:  Right-sided inguinal hernia is again noted.      Vasculature: There is stable severe atherosclerotic change.     Bones: No acute fracture. Age-appropriate degenerative changes.   Impression        1.  Interval decrease in size of the mass in the right upper lobe; right lower lobe and right diaphragmatic marichuy mass have increased in size.  2.  The remainder this examination is unchanged.    RECIST SUMMARY: Date of prior examination 09/22/2017.  Right upper lobe mass measures 4.1 cm maximally (series 2, image 20), previously 4.7 CM  Right lower lobe mass measures 4.9 CM (series 2, image 36), previously 3.8 CM  Diaphragmatic marichuy mass measures 4.7 CM (series 2, image 51), previously 3.4 CM         Assessment:       1. Primary cancer of right lung    2. Recurrent non-small cell lung cancer    3. Cancer associated pain    4. Exam for clinical trial    5. Hypercalcemia    6. SOB (shortness of breath)           Plan:     Mr. Salazar has clear progression on his CT and we'll discontinue immunotherapy at this time.  I've discussed with him other options for therapy including no therapy.  We have discussed options of docetaxel and  also afatinib.  We discussed the rationale and potential side effects of both these agents.  He would rather proceed with afatinib this time.  I've given him written information on this agent and he has signed an informed consent.  I have sent this to the [facility] and he can start once this is received.  Additionally, his calcium is elevated likely due to his malignancy and will give him a dose of zoledronic acid today.  He is going to follow back up with pulmonary as he is yet to receive his oxygen.  Seen again in 4 weeks or sooner if needed.  Gomez Garsia DO, FACP  Hematology & Oncology  [facility] [place] [address]  ph. [phone]  Fax. [phone]    40 minutes were spent in coordination of patient's care, record review and counseling.  More than 50% of the time was face-to-face.

## 2017-11-06 NOTE — TELEPHONE ENCOUNTER
DOCUMENTATION ONLY   PA was submitted to the insurance  11/7/2017  PA approved until 5/7/2018  Copay $3.70

## 2017-11-06 NOTE — PROGRESS NOTES
"Protocol:  Lung MAP  Sub study:  I  Arm 1- Nivolumab/Ipilumumab  Sub Investigator: Gomez Garsia DO  Treating Physician: Gomez Garsia DO  Pt Initials: KEYLA RAMIRES  Patient ID: [id number]  IRB#: [id number]    November 6, 2017    End of Treatment Visit    Patient seen in clinic today for end of treatment due to progression visit.  PRO and EQ-5D questionnaires completed by patient. He states that his cough is a little better, but he still gets "short winded" when he exerts himself. He was seen by pulmonology and is set up to receive home oxygen therapy. He still has not received his home oxygen and will follow up. Phone number provided. His pain has been well managed with Percocet alone. Patient's appetite has improved since starting Megace. He states, "that stuff really works." His weight is increasing. Overall he is feeling better today. His activity remains the same since last visit. His ECOG is 2 per Dr. Garsia. He did mention that his left hip is not longer sore and the discoloration is gone. Encouraged patient to continue to turn from back to side at least every two hours if lying in bed and avoid lying on bony prominences.  Labs reviewed and assessment completed by Dr. Garsia. Patient's calcium is 11.5 mg/dl this visit. Dr. Garsia would like patient to receive one dose of Zometa. His magnesium level is WNL since receiving IVPB magnesium at last visit..  All labs were reviewed with Dr. Garsia. He is aware of low free T3 and no intervention is  indicated. I explained to patient that since he is no longer on study treatment any further treatment or scheduling questions. He does not wish to continue filling out PRO and EQ-5D questionnaires. I explained that he will move into follow-up and we will contact him periodically with some questions. He is ok with this.     Weight- 51.7 kg  Height- 157.5m  BSA- 1.5 m2  BP-110/78  HR- 106  RR- 14  T- 97.8F  O2- 96%      AEs     Anemia Grade 2-  Chronic NCS. " Intermittent. Grade 2 noted today.  Anorexia Grade 2- Appetite has improved.    Cough Grade 2- Chronic cough. Patient states that cough has slightly improved.  Ongoing  Dyspnea Grade 2- No changes still grade 2. Relieved at rest. Patient was seen by pulmonologist and he is still waiting on home oxygen. Phone number provided to follow up.  Fatigue Grade 1- Relieved with rest per patient.  Generalized muscle weakness Grade 1- weakness perceived by patient and not evident on exam  Hyponatremia Grade 3- Ongoing and no interventions taken. NCS per Dr. Garsia  Non cardiac chest pain Grade 2- Patient has history of cracked ribs and sternum. Occasional pain. Patient was not taking Morphine because it made him sleep too much. Percocet  mg increased to 1-2 tabs Q 4-6 hours PRN  Pain Grade 2-  Pain to flank. Patient has history of cracked ribs and sternum.Percocet  mg increased to 1-2 tabs Q 4-6 hours PRN. This has improved per patient  Paresthesia Grade 1- Occasional tingling and cold sensation to fingertips. Does not interfere with ADLs.   Sinus tachycardia Grade 1- Asymptomatic. This was a baseline issue. No intervention indicated.  Weight loss Grade 1- 5 to <10% from baseline; intervention not indicated. Remains a grade 1. Patient maintained weight.  Wheezing Grade 2- Relieved by nebulizers and inhalers. Occasional no changes reported.   Hypercalcemia Grade 2- Corrected calcium is 12.4mg/dl. Dr. Garsia noted. Plans to give a dose of Zometa today.   Hypoalbuminemia Grade 1- indication of poor diet per Dr. Garsia. Encouraged patient to increase protein intake.  Hypomagnesemia Grade 1- Resolved

## 2017-11-08 ENCOUNTER — TELEPHONE (OUTPATIENT)
Dept: HEMATOLOGY/ONCOLOGY | Facility: CLINIC | Age: 67
End: 2017-11-08

## 2017-11-08 DIAGNOSIS — C34.91 PRIMARY CANCER OF RIGHT LUNG: Primary | ICD-10-CM

## 2017-11-10 ENCOUNTER — TELEPHONE (OUTPATIENT)
Dept: PULMONOLOGY | Facility: CLINIC | Age: 67
End: 2017-11-10

## 2017-11-10 ENCOUNTER — TELEPHONE (OUTPATIENT)
Dept: PHARMACY | Facility: CLINIC | Age: 67
End: 2017-11-10

## 2017-11-10 NOTE — TELEPHONE ENCOUNTER
Spoke to the pt's wife and let her know, [facility] and someone will contact them to set up the oxygen.

## 2017-11-10 NOTE — TELEPHONE ENCOUNTER
Initial Gilotrif consult completed on 11/10/2017 . Gilotrif (afatinib) will be shipped on 2017 to arrive at patient's home on 2017 via [facility]. $3.70 copay. Patient plans to start Gilotrif on 11/15/2017. Address confirmed. Confirmed 2 patient identifiers - name and . Therapy Appropriate.     Patient was counseled on the administration directions:  -Take 1 tablet (40mg) by mouth once daily on an empty stomach, take either 1 hour before or 2 hours after meals.    -Do not chew, crush, or break the tablets.   If possible, patient was instructed tip the tablets from the RX bottle to the cap, and take directly from the cap to the mouth.  Patient may handle the medication with their hands if they wear with a latex or nitrile glove and wash their hands before and after handling the tablets.    Patient was counseled on the following possible side effects, which include, but are not limited to:  fatigue, dry skin, acne/pimples, rash, diarrhea, nausea, loss of appetite, vomiting, mouth sores, epistaxis.  Patient was given Eucerin cream for dry skin, and hydrocortisone cream for dermatitis. Patient advised to watch for severe side effects, such as signs of liver, kidney, lung, heart, and eye problems.  Patient provided with Gilotrif  starter pack as well.    DDIs: Medication list reviewed,  carvedilol may increase serum concentrations of gilotrif - monitor patient    Patient was given 2 patient education handouts on how to handle oral chemotherapy and specific recommendations- do's and don'ts. Instructed the patient that if they have any remaining oral chemotherapy, not to flush down the toilet or throw away in the trash; the patient or caregiver should return the unused oral chemotherapy to either the clinic or to myself in the Pharmacy where the oral chemotherapy can be disposed of properly.     Patient confirmed understanding. Patient did not have additional questions and understands he can call [facility]  with any questions. Patient plans to start Gilotrif on 11/14/2017.  Consultation included: indication; goals of treatment; administration; storage and handling; side effects; how to handle side effects; the importance of compliance; how to handle missed doses; the importance of laboratory monitoring; the importance of keeping all follow up appointments.  Patient understands to report any medication changes to [facility] and provider. All questions answered and addressed to patients satisfaction. I will f/u with patient in 1 week from start, [facility] to contact patient in 3 weeks for refills.

## 2017-11-14 DIAGNOSIS — J43.2 CENTRILOBULAR EMPHYSEMA: ICD-10-CM

## 2017-11-14 DIAGNOSIS — G89.3 CANCER ASSOCIATED PAIN: ICD-10-CM

## 2017-11-14 DIAGNOSIS — Z00.6 EXAM FOR CLINICAL TRIAL: ICD-10-CM

## 2017-11-14 DIAGNOSIS — C34.90 RECURRENT NON-SMALL CELL LUNG CANCER: ICD-10-CM

## 2017-11-14 NOTE — TELEPHONE ENCOUNTER
----- Message from Pito Real sent at 11/14/2017  9:24 AM CST -----  Contact: Spouse-Augusta   Will like a call from office regarding infusion appt on today. Spouse states pt should not be scheduled for infusion appt. She was under the impression that he'll take the pill and stop chemo    Contact::[phone]

## 2017-11-14 NOTE — TELEPHONE ENCOUNTER
Confirmed with patient's wife that the infusion was for Zometa, not chemo and that they need to come for infusion.  Wife confirmed they would be here for appointment.

## 2017-11-15 RX ORDER — ASPIRIN 81 MG/1
81 TABLET ORAL DAILY
Qty: 30 TABLET | Refills: 11 | COMMUNITY
Start: 2017-11-15

## 2017-11-15 RX ORDER — OXYCODONE AND ACETAMINOPHEN 10; 325 MG/1; MG/1
1 TABLET ORAL EVERY 4 HOURS PRN
Qty: 90 TABLET | Refills: 0 | Status: SHIPPED | OUTPATIENT
Start: 2017-11-15 | End: 2017-12-18 | Stop reason: SDUPTHER

## 2017-11-15 NOTE — TELEPHONE ENCOUNTER
----- Message from Sandra Sanchez sent at 11/15/2017  1:30 PM CST -----  Contact: Pt's wife Augusta  Pt's wife is calling in regards to a refill on pt's pain medications(oxycodone-acetaminophen (PERCOCET)  mg per tablet and aspirin (ECOTRIN) 81 MG EC tablet). [facility] [address] [place] [address] [address] AT [facility].     Pt's wife can be reached at [phone].    Thank you

## 2017-11-22 ENCOUNTER — TELEPHONE (OUTPATIENT)
Dept: PHARMACY | Facility: CLINIC | Age: 67
End: 2017-11-22

## 2017-11-27 NOTE — TELEPHONE ENCOUNTER
Patient's wife reports patient has been having difficulty with Gilotrif due to diarrhea.  She reports provider held dose for 1 day last week while diarrhea resolved.  Diarrhea has returned this week.  Patient has not been taking Imodium OTC.  Advised wife to give patient Imodium and contact provider if still having >4 episodes of diarrhea.  Advised patient to keep log of diarrhea and Imodium use to bring to provider appointment 12/4 to discuss.

## 2017-11-30 DIAGNOSIS — C34.90 MALIGNANT NEOPLASM OF LUNG, UNSPECIFIED LATERALITY, UNSPECIFIED PART OF LUNG: ICD-10-CM

## 2017-11-30 RX ORDER — CYPROHEPTADINE HYDROCHLORIDE 4 MG/1
TABLET ORAL
Qty: 90 TABLET | Refills: 0 | Status: SHIPPED | OUTPATIENT
Start: 2017-11-30

## 2017-12-04 ENCOUNTER — INFUSION (OUTPATIENT)
Dept: INFUSION THERAPY | Facility: HOSPITAL | Age: 67
End: 2017-12-04
Attending: INTERNAL MEDICINE
Payer: MEDICARE

## 2017-12-04 ENCOUNTER — LAB VISIT (OUTPATIENT)
Dept: LAB | Facility: HOSPITAL | Age: 67
End: 2017-12-04
Attending: INTERNAL MEDICINE
Payer: MEDICARE

## 2017-12-04 ENCOUNTER — OFFICE VISIT (OUTPATIENT)
Dept: HEMATOLOGY/ONCOLOGY | Facility: CLINIC | Age: 67
End: 2017-12-04
Payer: MEDICARE

## 2017-12-04 VITALS
WEIGHT: 99 LBS | HEIGHT: 62 IN | HEART RATE: 111 BPM | SYSTOLIC BLOOD PRESSURE: 127 MMHG | BODY MASS INDEX: 18.22 KG/M2 | TEMPERATURE: 98 F | OXYGEN SATURATION: 98 % | RESPIRATION RATE: 18 BRPM | DIASTOLIC BLOOD PRESSURE: 96 MMHG

## 2017-12-04 VITALS
HEART RATE: 106 BPM | SYSTOLIC BLOOD PRESSURE: 103 MMHG | DIASTOLIC BLOOD PRESSURE: 68 MMHG | TEMPERATURE: 98 F | RESPIRATION RATE: 18 BRPM

## 2017-12-04 DIAGNOSIS — G89.3 CANCER ASSOCIATED PAIN: ICD-10-CM

## 2017-12-04 DIAGNOSIS — C34.90 RECURRENT NON-SMALL CELL LUNG CANCER: Primary | ICD-10-CM

## 2017-12-04 DIAGNOSIS — C34.91 PRIMARY CANCER OF RIGHT LUNG: ICD-10-CM

## 2017-12-04 DIAGNOSIS — E87.6 HYPOKALEMIA: ICD-10-CM

## 2017-12-04 DIAGNOSIS — R63.0 DECREASED APPETITE: ICD-10-CM

## 2017-12-04 DIAGNOSIS — E43 SEVERE PROTEIN-CALORIE MALNUTRITION: ICD-10-CM

## 2017-12-04 DIAGNOSIS — E83.52 HYPERCALCEMIA: Primary | ICD-10-CM

## 2017-12-04 DIAGNOSIS — R63.4 WEIGHT LOSS: ICD-10-CM

## 2017-12-04 DIAGNOSIS — C34.90 RECURRENT NON-SMALL CELL LUNG CANCER: ICD-10-CM

## 2017-12-04 LAB
ALBUMIN SERPL BCP-MCNC: 2.9 G/DL
ALP SERPL-CCNC: 85 U/L
ALT SERPL W/O P-5'-P-CCNC: 87 U/L
ANION GAP SERPL CALC-SCNC: 11 MMOL/L
AST SERPL-CCNC: 27 U/L
BILIRUB SERPL-MCNC: 0.4 MG/DL
BUN SERPL-MCNC: 18 MG/DL
CALCIUM SERPL-MCNC: 9 MG/DL
CHLORIDE SERPL-SCNC: 94 MMOL/L
CO2 SERPL-SCNC: 22 MMOL/L
CREAT SERPL-MCNC: 1.3 MG/DL
ERYTHROCYTE [DISTWIDTH] IN BLOOD BY AUTOMATED COUNT: 16.2 %
EST. GFR  (AFRICAN AMERICAN): >60 ML/MIN/1.73 M^2
EST. GFR  (NON AFRICAN AMERICAN): 57.3 ML/MIN/1.73 M^2
GLUCOSE SERPL-MCNC: 110 MG/DL
HCT VFR BLD AUTO: 32 %
HGB BLD-MCNC: 10.4 G/DL
IMM GRANULOCYTES # BLD AUTO: 0.09 K/UL
MAGNESIUM SERPL-MCNC: 1.9 MG/DL
MCH RBC QN AUTO: 25 PG
MCHC RBC AUTO-ENTMCNC: 32.5 G/DL
MCV RBC AUTO: 77 FL
NEUTROPHILS # BLD AUTO: 5.6 K/UL
PLATELET # BLD AUTO: 550 K/UL
PMV BLD AUTO: 9.2 FL
POTASSIUM SERPL-SCNC: 2.7 MMOL/L
PROT SERPL-MCNC: 9 G/DL
RBC # BLD AUTO: 4.16 M/UL
SODIUM SERPL-SCNC: 127 MMOL/L
WBC # BLD AUTO: 7.89 K/UL

## 2017-12-04 PROCEDURE — 80053 COMPREHEN METABOLIC PANEL: CPT

## 2017-12-04 PROCEDURE — 85027 COMPLETE CBC AUTOMATED: CPT

## 2017-12-04 PROCEDURE — 99215 OFFICE O/P EST HI 40 MIN: CPT | Mod: S$GLB,,, | Performed by: INTERNAL MEDICINE

## 2017-12-04 PROCEDURE — 83735 ASSAY OF MAGNESIUM: CPT

## 2017-12-04 PROCEDURE — 96361 HYDRATE IV INFUSION ADD-ON: CPT

## 2017-12-04 PROCEDURE — 96360 HYDRATION IV INFUSION INIT: CPT

## 2017-12-04 PROCEDURE — 36415 COLL VENOUS BLD VENIPUNCTURE: CPT

## 2017-12-04 PROCEDURE — 99999 PR PBB SHADOW E&M-EST. PATIENT-LVL III: CPT | Mod: PBBFAC,,, | Performed by: INTERNAL MEDICINE

## 2017-12-04 PROCEDURE — 63600175 PHARM REV CODE 636 W HCPCS: Performed by: INTERNAL MEDICINE

## 2017-12-04 RX ORDER — POTASSIUM CHLORIDE 20 MEQ/1
40 TABLET, EXTENDED RELEASE ORAL 2 TIMES DAILY
Qty: 8 TABLET | Refills: 0 | Status: SHIPPED | OUTPATIENT
Start: 2017-12-04 | End: 2017-12-04 | Stop reason: SDUPTHER

## 2017-12-04 RX ORDER — HEPARIN 100 UNIT/ML
500 SYRINGE INTRAVENOUS
Status: DISCONTINUED | OUTPATIENT
Start: 2017-12-04 | End: 2017-12-04 | Stop reason: HOSPADM

## 2017-12-04 RX ORDER — SODIUM CHLORIDE AND POTASSIUM CHLORIDE 150; 900 MG/100ML; MG/100ML
INJECTION, SOLUTION INTRAVENOUS
Status: COMPLETED | OUTPATIENT
Start: 2017-12-04 | End: 2017-12-04

## 2017-12-04 RX ORDER — PREDNISONE 5 MG/1
5 TABLET ORAL DAILY
Qty: 30 TABLET | Refills: 0 | Status: SHIPPED | OUTPATIENT
Start: 2017-12-04 | End: 2017-12-14

## 2017-12-04 RX ORDER — SODIUM CHLORIDE AND POTASSIUM CHLORIDE 150; 900 MG/100ML; MG/100ML
INJECTION, SOLUTION INTRAVENOUS
Status: CANCELLED | OUTPATIENT
Start: 2017-12-04 | End: 2017-12-04

## 2017-12-04 RX ORDER — SODIUM CHLORIDE 0.9 % (FLUSH) 0.9 %
10 SYRINGE (ML) INJECTION
Status: DISCONTINUED | OUTPATIENT
Start: 2017-12-04 | End: 2017-12-04 | Stop reason: HOSPADM

## 2017-12-04 RX ORDER — SODIUM CHLORIDE 0.9 % (FLUSH) 0.9 %
10 SYRINGE (ML) INJECTION
Status: CANCELLED | OUTPATIENT
Start: 2017-12-04

## 2017-12-04 RX ORDER — HEPARIN 100 UNIT/ML
500 SYRINGE INTRAVENOUS
Status: CANCELLED | OUTPATIENT
Start: 2017-12-04

## 2017-12-04 RX ORDER — POTASSIUM CHLORIDE 20 MEQ/1
TABLET, EXTENDED RELEASE ORAL
Qty: 360 TABLET | Refills: 0 | Status: SHIPPED | OUTPATIENT
Start: 2017-12-04

## 2017-12-04 RX ADMIN — SODIUM CHLORIDE AND POTASSIUM CHLORIDE: .9; .15 SOLUTION INTRAVENOUS at 12:12

## 2017-12-04 NOTE — PLAN OF CARE
Problem: Chemotherapy Effects (Adult)  Goal: Signs and Symptoms of Listed Potential Problems Will be Absent, Minimized or Managed (Chemotherapy Effects)  Signs and symptoms of listed potential problems will be absent, minimized or managed by discharge/transition of care (reference Chemotherapy Effects (Adult) CPG).   Outcome: Ongoing (interventions implemented as appropriate)  Pt here for I liter ivf with 20 meq, labs, hx, meds, allergies reviewed. Reclined in chair, spouse with pt., continue to monitor

## 2017-12-04 NOTE — PROGRESS NOTES
PATIENT: Karsten Salazar  MRN: [id number]  DATE: 12/4/2017      Diagnosis:   1. Recurrent non-small cell lung cancer    2. Cancer associated pain    3. Decreased appetite    4. Weight loss    5. Severe protein-calorie malnutrition    6. Hypokalemia        Chief Complaint: No chief complaint on file.      Oncologic History:      Oncologic History Non-small cell lung cancer diagnosed 6/2016   Recurrent disease to lung 5/2017    Oncologic Treatment Concurrent chemoXRT with weekly carboplatin and paclitaxel, XRT to a dose of 5840 cGy 10/31/2016  Carboplatin/gemcitabine 6/2/2017 - 7/2017 (discontinued due to progression)  Ipilimumab plus nivolumab  clinical trial 8/14/17 - 11/2017 (discontinued due to progression)  Afatinib 11/2017    Pathology Squamous cell carcinoma, T3, N2, M0 (6/2016), PD-L1 TPS 1%        Subjective:    Interval History: Mr. Saalzar is a 67 y.o. male who is seen in follow-up for lung cancer.  Since I had seen him last he was started on therapy with afatinib.  He states that he is had some ongoing pain in his mouth and difficulty swallowing since starting treatment.  He has diarrhea approximately one time per day.  His activity level has declined as well and is in bed nearly the entire day.  He is lost weight.  He still has ongoing shortness of breath and chest wall pain.  No other new complaints.    He has previously seen Dr. Ubaldo Thomason at [facility].  His history dates to 6/2016 when he sought medical attention for shortness of breath and cough.  He was found to have a large lung mass in the right upper lobe with mediastinal lymphadenopathy.  He underwent subsequent workup, however, symptoms worsened and progressed to him having a cardiac arrest requiring CPR and prolonged ICU stay.  Eventually a biopsy was performed showing squamous cell lung cancer.  This was initially staged as a T3, N2.  He was treated with concurrent chemotherapy and radiation until 10/31/16.  He is an on surveillance since this  time.  His insurance changed and is now [facility] [employer] [facility].  Repeat PET/CT had shown locally recurrent disease.  MRI of the brain was negative.  He was started on carboplatin and gemcitabine on 6/2/17.  CT on 7/19/17 and had indicated progressive disease.  He was started on ipilimumab and nivolumab on a clinical trial on 8/14/17.  CT in 9/2017 had revealed stable disease. CT in 11/2017 have demonstrated progressive disease and he was taken off immunotherapy.  He was subsequently placed on therapy with afatinib in 11/2017.      Past Medical History:   Past Medical History:   Diagnosis Date    Cancer associated pain 8/28/2017    Centrilobular emphysema 9/11/2017    Chemotherapy follow-up examination 10/23/2017    Decreased appetite 10/23/2017    Exam for clinical trial 8/9/2017    History of cardiac arrest     Hypercalcemia 11/6/2017    Hyperlipidemia     Hypertension     Hypokalemia 12/4/2017    Mediastinal lymphadenopathy 5/24/2017    Primary cancer of right lung 4/6/2017    Protein calorie malnutrition 9/11/2017    Recurrent non-small cell lung cancer 5/24/2017    Weight loss 12/4/2017       Past Surgical HIstory:   Past Surgical History:   Procedure Laterality Date    HERNIA REPAIR         Family History:   Family History   Problem Relation Age of Onset    Cancer Mother        Social History:  reports that he has quit smoking. His smoking use included Cigarettes. He has a 72.00 pack-year smoking history. He has never used smokeless tobacco. He reports that he drinks about 10.2 oz of alcohol per week . He reports that he does not use drugs.    Allergies:  Review of patient's allergies indicates:  No Known Allergies    Medications:  Current Outpatient Prescriptions   Medication Sig Dispense Refill    afatinib 40 mg Tab Take 40 mg by mouth once daily. 30 tablet 6    albuterol 90 mcg/actuation inhaler Inhale 2 puffs into the lungs every 6 (six) hours as needed for Wheezing. 1 Inhaler 0     albuterol-ipratropium 2.5mg-0.5mg/3mL (DUO-NEB) 0.5 mg-3 mg(2.5 mg base)/3 mL nebulizer solution Take 3 mLs by nebulization every 4 (four) hours as needed for Wheezing. 20 vial 0    aspirin (ECOTRIN) 81 MG EC tablet Take 1 tablet (81 mg total) by mouth once daily. 30 tablet 11    atorvastatin (LIPITOR) 40 MG tablet Take 40 mg by mouth.      benzonatate (TESSALON) 100 MG capsule Take 100 mg by mouth.      carvedilol (COREG) 3.125 MG tablet TAKE 1 TABLET BY MOUTH TWICE DAILY 180 tablet 0    carvedilol (COREG) 3.125 MG tablet TAKE 1 TABLET BY MOUTH TWICE DAILY 60 tablet 0    cyproheptadine (PERIACTIN) 4 mg tablet TAKE 1 TABLET BY MOUTH THREE TIMES DAILY 90 tablet 0    dronabinol (MARINOL) 5 MG capsule Take 1 capsule (5 mg total) by mouth 2 (two) times daily before meals. 60 capsule 0    fluticasone (FLOVENT HFA) 110 mcg/actuation inhaler Inhale 2 puffs into the lungs 2 (two) times daily. Controller 12 g 0    guaifenesin-codeine 100-10 mg/5 ml (TUSSI-ORGANIDIN NR)  mg/5 mL syrup Take 5 mLs by mouth 3 (three) times daily as needed for Cough.      lisinopril (PRINIVIL,ZESTRIL) 2.5 MG tablet Take 2.5 mg by mouth.      magnesium hydroxide 400 mg/5 ml (MILK OF MAGNESIA) 400 mg/5 mL Susp Take 30 mLs by mouth daily as needed.      megestrol (MEGACE) 400 mg/10 mL (40 mg/mL) Susp Take 10 mLs (400 mg total) by mouth once daily. 480 mL 11    ondansetron (ZOFRAN, AS HYDROCHLORIDE,) 4 MG tablet Take 1 tablet (4 mg total) by mouth every 8 (eight) hours as needed for Nausea. 60 tablet 1    oxyCODONE-acetaminophen (PERCOCET)  mg per tablet Take 1 tablet by mouth every 4 (four) hours as needed for Pain. 90 tablet 0    potassium chloride SA (K-DUR,KLOR-CON) 20 MEQ tablet Take 2 tablets (40 mEq total) by mouth 2 (two) times daily. 8 tablet 0    predniSONE (DELTASONE) 5 MG tablet Take 1 tablet (5 mg total) by mouth once daily. 30 tablet 0    tiotropium (SPIRIVA) 18 mcg inhalation capsule Inhale 1 capsule (18  "mcg total) into the lungs once daily. 30 capsule 1     No current facility-administered medications for this visit.        Review of Systems   Constitutional: Positive for activity change, appetite change, fatigue and unexpected weight change. Negative for chills and fever.   HENT: Positive for trouble swallowing. Negative for dental problem, sinus pressure and sneezing.    Eyes: Negative for visual disturbance.   Respiratory: Positive for cough and shortness of breath. Negative for choking and chest tightness.    Cardiovascular: Positive for chest pain. Negative for leg swelling.   Gastrointestinal: Negative for abdominal pain, blood in stool, constipation, diarrhea and nausea.   Genitourinary: Negative for difficulty urinating and dysuria.   Musculoskeletal: Negative for arthralgias and back pain.   Skin: Negative for rash and wound.   Neurological: Negative for dizziness, light-headedness and headaches.   Hematological: Negative for adenopathy. Does not bruise/bleed easily.   Psychiatric/Behavioral: Negative for sleep disturbance. The patient is not nervous/anxious.        ECOG Performance Status:   ECOG SCORE    3 - Capable of only limited selfcare, confined to bed or chair more than 50% of waking hours         Objective:      Vitals:   Vitals:    12/04/17 1041   BP: (!) 127/96   Pulse: (!) 111   Resp: 18   Temp: 98 °F (36.7 °C)   TempSrc: Oral   SpO2: 98%   Weight: 44.9 kg (99 lb)   Height: 5' 2" (1.575 m)     BMI: Body mass index is 18.11 kg/m².    Physical Exam   Constitutional: He is oriented to person, place, and time. He appears cachectic.   HENT:   Head: Normocephalic and atraumatic.   Mouth/Throat: Mucous membranes are dry.   Eyes: Pupils are equal, round, and reactive to light.   Neck: Normal range of motion. Neck supple.   Cardiovascular: Normal rate and regular rhythm.    Pulmonary/Chest: Effort normal and breath sounds normal. No respiratory distress.   Abdominal: Soft. He exhibits no distension. " There is no tenderness.   Musculoskeletal: He exhibits no edema or tenderness.   Lymphadenopathy:     He has no cervical adenopathy.   Neurological: He is alert and oriented to person, place, and time. No cranial nerve deficit.   Skin: Skin is warm and dry.   Psychiatric: He has a normal mood and affect. His behavior is normal.       Laboratory Data:  Lab Visit on 12/04/2017   Component Date Value Ref Range Status    WBC 12/04/2017 7.89  3.90 - 12.70 K/uL Final    RBC 12/04/2017 4.16* 4.60 - 6.20 M/uL Final    Hemoglobin 12/04/2017 10.4* 14.0 - 18.0 g/dL Final    Hematocrit 12/04/2017 32.0* 40.0 - 54.0 % Final    MCV 12/04/2017 77* 82 - 98 fL Final    MCH 12/04/2017 25.0* 27.0 - 31.0 pg Final    MCHC 12/04/2017 32.5  32.0 - 36.0 g/dL Final    RDW 12/04/2017 16.2* 11.5 - 14.5 % Final    Platelets 12/04/2017 550* 150 - 350 K/uL Final    MPV 12/04/2017 9.2  9.2 - 12.9 fL Final    Gran # 12/04/2017 5.6  1.8 - 7.7 K/uL Final    Comment: The ANC is based on a white cell differential from an   automated cell counter. It has not been microscopically   reviewed for the presence of abnormal cells. Clinical   correlation is required.      Immature Grans (Abs) 12/04/2017 0.09* 0.00 - 0.04 K/uL Final    Sodium 12/04/2017 127* 136 - 145 mmol/L Final    Potassium 12/04/2017 2.7* 3.5 - 5.1 mmol/L Final    Comment: *Critical value -  Results called to and read back by:jose ravi rn      Chloride 12/04/2017 94* 95 - 110 mmol/L Final    CO2 12/04/2017 22* 23 - 29 mmol/L Final    Glucose 12/04/2017 110  70 - 110 mg/dL Final    BUN, Bld 12/04/2017 18  8 - 23 mg/dL Final    Creatinine 12/04/2017 1.3  0.5 - 1.4 mg/dL Final    Calcium 12/04/2017 9.0  8.7 - 10.5 mg/dL Final    Total Protein 12/04/2017 9.0* 6.0 - 8.4 g/dL Final    Albumin 12/04/2017 2.9* 3.5 - 5.2 g/dL Final    Total Bilirubin 12/04/2017 0.4  0.1 - 1.0 mg/dL Final    Comment: For infants and newborns, interpretation of results should be  based  on gestational age, weight and in agreement with clinical  observations.  Premature Infant recommended reference ranges:  Up to 24 hours.............<8.0 mg/dL  Up to 48 hours............<12.0 mg/dL  3-5 days..................<15.0 mg/dL  6-29 days.................<15.0 mg/dL      Alkaline Phosphatase 12/04/2017 85  55 - 135 U/L Final    AST 12/04/2017 27  10 - 40 U/L Final    ALT 12/04/2017 87* 10 - 44 U/L Final    Anion Gap 12/04/2017 11  8 - 16 mmol/L Final    eGFR if African American 12/04/2017 >60.0  >60 mL/min/1.73 m^2 Final    eGFR if non African American 12/04/2017 57.3* >60 mL/min/1.73 m^2 Final    Comment: Calculation used to obtain the estimated glomerular filtration  rate (eGFR) is the CKD-EPI equation.            Imaging:  CT 11/3/17  CT CHEST, ABDOMEN, and, PELVIS  with IV contrast    Protocol:  Axial CT images of the chest, abdomen, and pelvis were acquired  after the use of 75 cc [id number] IV contrast.  Coronal and sagittal reconstructions were acquired.    HISTORY:  67 year old M with Clinical trial disease assessment.     COMPARISON: CT chest abdomen pelvis 09/22/2017     FINDINGS:  Thoracic soft tissues: No significant abnormality.     Aorta: Normal in course and caliber, without significant atherosclerotic plaque. There are three branching vessels at the arch.       Heart: Stable small pericardial effusion     Anna Marie/Mediastinum: No significant lymphadenopathy     Lungs: Again demonstrated is right-sided pleural thickening and right pleural fluid.  There is a soft tissue mass in the right anterior upper lobe measuring 4.1 in axial dimension by 2.8 cm (series 2, image 20).  There is a soft tissue mass in the right lower lobe that now measures 4.9 CM (series 2, image 36).  In the right lower lung region at the diaphragmatic marichuy there is a 4.7 cm lesion (series 2,  51).  There are stable radiation changes in the right middle and lower lung fields.  Moderate emphysema is again noted.      Liver: Normal in size and attenuation, with no focal hepatic lesions.       Gallbladder: There is cholelithiasis.     Bile Ducts: No evidence of dilated ducts.     Pancreas: Pancreatic head calcifications are noted.      Spleen: Unremarkable.     Adrenals: Unremarkable.     Kidneys/ Ureters: Normal in size and location. Normal concentration and excretion of contrast. No hydronephrosis or nephrolithiasis. No ureteral dilatation.     Bladder: No evidence of wall thickening.     Reproductive organs: Unremarkable.     GI Tract/Mesentery: No evidence of bowel obstruction or inflammation.     Peritoneal Space: No ascites. No free air.     Retroperitoneum:  No significant adenopathy.      Abdominal wall:  Right-sided inguinal hernia is again noted.      Vasculature: There is stable severe atherosclerotic change.     Bones: No acute fracture. Age-appropriate degenerative changes.   Impression        1.  Interval decrease in size of the mass in the right upper lobe; right lower lobe and right diaphragmatic marichuy mass have increased in size.  2.  The remainder this examination is unchanged.    RECIST SUMMARY: Date of prior examination 09/22/2017.  Right upper lobe mass measures 4.1 cm maximally (series 2, image 20), previously 4.7 CM  Right lower lobe mass measures 4.9 CM (series 2, image 36), previously 3.8 CM  Diaphragmatic marichuy mass measures 4.7 CM (series 2, image 51), previously 3.4 CM         Assessment:       1. Recurrent non-small cell lung cancer    2. Cancer associated pain    3. Decreased appetite    4. Weight loss    5. Severe protein-calorie malnutrition    6. Hypokalemia           Plan:     Mr. Salazar has severely declined since I had seen him last.  At this point I will discontinue afatinib.  He will get IV fluids along with potassium today.  I will also start him on a low-dose prednisone.  I have written for potassium by mouth that he will start today.  Hold Zometa today.  I'm awaiting a magnesium level.   Overall he appears to have progression of his disease and at this point we may not be able to turn this around.  I will plan to see him back in another 2 weeks and have instructed him to increase his by mouth hydration as much as he can.  If there is no improvement I will discuss options for hospice with him.  All questions were answered and he is agreeable with this plan.    Gomez Garsia DO, FACP  Hematology & Oncology  [facility] [place] [address]  ph. [phone]  Fax. [phone]    40 minutes were spent in coordination of patient's care, record review and counseling.  More than 50% of the time was face-to-face.

## 2017-12-04 NOTE — PLAN OF CARE
Problem: Patient Care Overview  Goal: Plan of Care Review  Outcome: Ongoing (interventions implemented as appropriate)  Pt tolerated ivf with KCL without issue, avs given, pt has no upcoming appts. Scheduled at this time, no distress noted upon d/c to home

## 2017-12-18 DIAGNOSIS — J43.2 CENTRILOBULAR EMPHYSEMA: ICD-10-CM

## 2017-12-18 DIAGNOSIS — G89.3 CANCER ASSOCIATED PAIN: ICD-10-CM

## 2017-12-18 DIAGNOSIS — C34.90 RECURRENT NON-SMALL CELL LUNG CANCER: ICD-10-CM

## 2017-12-18 DIAGNOSIS — Z00.6 EXAM FOR CLINICAL TRIAL: ICD-10-CM

## 2017-12-18 RX ORDER — OXYCODONE AND ACETAMINOPHEN 10; 325 MG/1; MG/1
1 TABLET ORAL EVERY 4 HOURS PRN
Qty: 90 TABLET | Refills: 0 | Status: SHIPPED | OUTPATIENT
Start: 2017-12-18 | End: 2018-01-05 | Stop reason: SDUPTHER

## 2017-12-18 NOTE — TELEPHONE ENCOUNTER
----- Message from Valeria Cardona sent at 12/18/2017  2:23 PM CST -----  Contact: Pts wife, Vee  Pts wife is calling to request a refill on pts medication.  He uses [facility].      Oxycodone    Pt can be reached at [phone]

## 2017-12-18 NOTE — TELEPHONE ENCOUNTER
Spoke to patient and wife.  They thought the 18th was Wed. Moved appt to Jan 5.  Will send reminder in the mail.  The patient's wife says that other than pain, he is eating well, feeling better and moving around a lot more.  I will have labs done on 1/5.

## 2017-12-21 ENCOUNTER — LAB VISIT (OUTPATIENT)
Dept: LAB | Facility: HOSPITAL | Age: 67
End: 2017-12-21
Attending: INTERNAL MEDICINE
Payer: MEDICARE

## 2017-12-21 DIAGNOSIS — C34.91 PRIMARY LUNG CANCER, RIGHT: ICD-10-CM

## 2017-12-21 DIAGNOSIS — C34.91 PRIMARY CANCER OF RIGHT LUNG: ICD-10-CM

## 2017-12-21 LAB
ALBUMIN SERPL BCP-MCNC: 2.7 G/DL
ALBUMIN SERPL BCP-MCNC: 2.7 G/DL
ALP SERPL-CCNC: 83 U/L
ALP SERPL-CCNC: 83 U/L
ALT SERPL W/O P-5'-P-CCNC: 24 U/L
ALT SERPL W/O P-5'-P-CCNC: 24 U/L
ANION GAP SERPL CALC-SCNC: 6 MMOL/L
ANION GAP SERPL CALC-SCNC: 6 MMOL/L
AST SERPL-CCNC: 16 U/L
AST SERPL-CCNC: 16 U/L
BILIRUB SERPL-MCNC: 0.3 MG/DL
BILIRUB SERPL-MCNC: 0.3 MG/DL
BUN SERPL-MCNC: 11 MG/DL
BUN SERPL-MCNC: 11 MG/DL
CALCIUM SERPL-MCNC: 9.8 MG/DL
CALCIUM SERPL-MCNC: 9.8 MG/DL
CHLORIDE SERPL-SCNC: 100 MMOL/L
CHLORIDE SERPL-SCNC: 100 MMOL/L
CO2 SERPL-SCNC: 27 MMOL/L
CO2 SERPL-SCNC: 27 MMOL/L
CREAT SERPL-MCNC: 0.9 MG/DL
CREAT SERPL-MCNC: 0.9 MG/DL
EST. GFR  (AFRICAN AMERICAN): >60 ML/MIN/1.73 M^2
EST. GFR  (AFRICAN AMERICAN): >60 ML/MIN/1.73 M^2
EST. GFR  (NON AFRICAN AMERICAN): >60 ML/MIN/1.73 M^2
EST. GFR  (NON AFRICAN AMERICAN): >60 ML/MIN/1.73 M^2
GLUCOSE SERPL-MCNC: 106 MG/DL
GLUCOSE SERPL-MCNC: 106 MG/DL
MAGNESIUM SERPL-MCNC: 1.9 MG/DL
POTASSIUM SERPL-SCNC: 3.9 MMOL/L
POTASSIUM SERPL-SCNC: 3.9 MMOL/L
PROT SERPL-MCNC: 8.1 G/DL
PROT SERPL-MCNC: 8.1 G/DL
SODIUM SERPL-SCNC: 133 MMOL/L
SODIUM SERPL-SCNC: 133 MMOL/L

## 2017-12-21 PROCEDURE — 80053 COMPREHEN METABOLIC PANEL: CPT

## 2017-12-21 PROCEDURE — 83735 ASSAY OF MAGNESIUM: CPT

## 2017-12-21 PROCEDURE — 36415 COLL VENOUS BLD VENIPUNCTURE: CPT

## 2018-01-05 ENCOUNTER — OFFICE VISIT (OUTPATIENT)
Dept: HEMATOLOGY/ONCOLOGY | Facility: CLINIC | Age: 68
End: 2018-01-05
Payer: MEDICARE

## 2018-01-05 VITALS
DIASTOLIC BLOOD PRESSURE: 76 MMHG | RESPIRATION RATE: 18 BRPM | SYSTOLIC BLOOD PRESSURE: 120 MMHG | WEIGHT: 107.81 LBS | HEIGHT: 62 IN | BODY MASS INDEX: 19.84 KG/M2 | HEART RATE: 118 BPM | TEMPERATURE: 97 F

## 2018-01-05 DIAGNOSIS — J43.2 CENTRILOBULAR EMPHYSEMA: ICD-10-CM

## 2018-01-05 DIAGNOSIS — Z00.6 EXAM FOR CLINICAL TRIAL: ICD-10-CM

## 2018-01-05 DIAGNOSIS — G89.3 CANCER ASSOCIATED PAIN: ICD-10-CM

## 2018-01-05 DIAGNOSIS — C34.90 RECURRENT NON-SMALL CELL LUNG CANCER: Primary | ICD-10-CM

## 2018-01-05 DIAGNOSIS — C34.90 RECURRENT NON-SMALL CELL LUNG CANCER: ICD-10-CM

## 2018-01-05 DIAGNOSIS — C34.91 PRIMARY CANCER OF RIGHT LUNG: Primary | ICD-10-CM

## 2018-01-05 PROCEDURE — 99999 PR PBB SHADOW E&M-EST. PATIENT-LVL V: CPT | Mod: PBBFAC,,, | Performed by: INTERNAL MEDICINE

## 2018-01-05 PROCEDURE — 99214 OFFICE O/P EST MOD 30 MIN: CPT | Mod: S$GLB,,, | Performed by: INTERNAL MEDICINE

## 2018-01-05 RX ORDER — OXYCODONE AND ACETAMINOPHEN 10; 325 MG/1; MG/1
1 TABLET ORAL EVERY 4 HOURS PRN
Qty: 90 TABLET | Refills: 0 | Status: SHIPPED | OUTPATIENT
Start: 2018-01-05

## 2018-01-05 NOTE — PROGRESS NOTES
PATIENT: Karsten Salazar  MRN: [id number]  DATE: 1/5/2018      Diagnosis:   1. Recurrent non-small cell lung cancer    2. Cancer associated pain        Chief Complaint: Recurrent non-small cell lung cancer (follow up)      Oncologic History:      Oncologic History Non-small cell lung cancer diagnosed 6/2016   Recurrent disease to lung 5/2017    Oncologic Treatment Concurrent chemoXRT with weekly carboplatin and paclitaxel, XRT to a dose of 5840 cGy 10/31/2016  Carboplatin/gemcitabine 6/2/2017 - 7/2017 (discontinued due to progression)  Ipilimumab plus nivolumab  clinical trial 8/14/17 - 11/2017 (discontinued due to progression)  Afatinib 11/2017 (discontinued 12/2017 due to intolerance)    Pathology Squamous cell carcinoma, T3, N2, M0 (6/2016), PD-L1 TPS 1%        Subjective:    Interval History: Mr. Salazar is a 67 y.o. male who is seen in follow-up for lung cancer.  He states that he still has ongoing pain in his right chest wall which is improved with the use of oxycodone.  Still with ongoing chest pain and shortness of breath.  His weight has picked up since discontinuation of afatinib.    He has previously seen Dr. Ubaldo Thomason at hospitals.  His history dates to 6/2016 when he sought medical attention for shortness of breath and cough.  He was found to have a large lung mass in the right upper lobe with mediastinal lymphadenopathy.  He underwent subsequent workup, however, symptoms worsened and progressed to him having a cardiac arrest requiring CPR and prolonged ICU stay.  Eventually a biopsy was performed showing squamous cell lung cancer.  This was initially staged as a T3, N2.  He was treated with concurrent chemotherapy and radiation until 10/31/16.  He is an on surveillance since this time.  His insurance changed and is now following and [facility].  Repeat PET/CT had shown locally recurrent disease.  MRI of the brain was negative.  He was started on carboplatin and gemcitabine on 6/2/17.  CT on 7/19/17 and  had indicated progressive disease.  He was started on ipilimumab and nivolumab on a clinical trial on 8/14/17.  CT in 9/2017 had revealed stable disease. CT in 11/2017 have demonstrated progressive disease and he was taken off immunotherapy.  He was subsequently placed on therapy with afatinib in 11/2017.      Past Medical History:   Past Medical History:   Diagnosis Date    Cancer associated pain 8/28/2017    Centrilobular emphysema 9/11/2017    Chemotherapy follow-up examination 10/23/2017    Decreased appetite 10/23/2017    Exam for clinical trial 8/9/2017    History of cardiac arrest     Hypercalcemia 11/6/2017    Hyperlipidemia     Hypertension     Hypokalemia 12/4/2017    Mediastinal lymphadenopathy 5/24/2017    Primary cancer of right lung 4/6/2017    Protein calorie malnutrition 9/11/2017    Recurrent non-small cell lung cancer 5/24/2017    Weight loss 12/4/2017       Past Surgical HIstory:   Past Surgical History:   Procedure Laterality Date    HERNIA REPAIR         Family History:   Family History   Problem Relation Age of Onset    Cancer Mother        Social History:  reports that he has quit smoking. His smoking use included Cigarettes. He has a 72.00 pack-year smoking history. He has never used smokeless tobacco. He reports that he drinks about 10.2 oz of alcohol per week . He reports that he does not use drugs.    Allergies:  Review of patient's allergies indicates:  No Known Allergies    Medications:  Current Outpatient Prescriptions   Medication Sig Dispense Refill    afatinib 40 mg Tab Take 40 mg by mouth once daily. 30 tablet 6    albuterol 90 mcg/actuation inhaler Inhale 2 puffs into the lungs every 6 (six) hours as needed for Wheezing. 1 Inhaler 0    albuterol-ipratropium 2.5mg-0.5mg/3mL (DUO-NEB) 0.5 mg-3 mg(2.5 mg base)/3 mL nebulizer solution Take 3 mLs by nebulization every 4 (four) hours as needed for Wheezing. 20 vial 0    aspirin (ECOTRIN) 81 MG EC tablet Take 1 tablet  (81 mg total) by mouth once daily. 30 tablet 11    atorvastatin (LIPITOR) 40 MG tablet Take 40 mg by mouth.      benzonatate (TESSALON) 100 MG capsule Take 100 mg by mouth.      carvedilol (COREG) 3.125 MG tablet TAKE 1 TABLET BY MOUTH TWICE DAILY 180 tablet 0    cyproheptadine (PERIACTIN) 4 mg tablet TAKE 1 TABLET BY MOUTH THREE TIMES DAILY 90 tablet 0    dronabinol (MARINOL) 5 MG capsule Take 1 capsule (5 mg total) by mouth 2 (two) times daily before meals. 60 capsule 0    fluticasone (FLOVENT HFA) 110 mcg/actuation inhaler Inhale 2 puffs into the lungs 2 (two) times daily. Controller 12 g 0    guaifenesin-codeine 100-10 mg/5 ml (TUSSI-ORGANIDIN NR)  mg/5 mL syrup Take 5 mLs by mouth 3 (three) times daily as needed for Cough.      magnesium hydroxide 400 mg/5 ml (MILK OF MAGNESIA) 400 mg/5 mL Susp Take 30 mLs by mouth daily as needed.      megestrol (MEGACE) 400 mg/10 mL (40 mg/mL) Susp Take 10 mLs (400 mg total) by mouth once daily. 480 mL 11    ondansetron (ZOFRAN, AS HYDROCHLORIDE,) 4 MG tablet Take 1 tablet (4 mg total) by mouth every 8 (eight) hours as needed for Nausea. 60 tablet 1    oxyCODONE-acetaminophen (PERCOCET)  mg per tablet Take 1 tablet by mouth every 4 (four) hours as needed for Pain. 90 tablet 0    potassium chloride SA (K-DUR,KLOR-CON) 20 MEQ tablet TAKE 2 TABLETS BY MOUTH TWICE DAILY 360 tablet 0    tiotropium (SPIRIVA) 18 mcg inhalation capsule Inhale 1 capsule (18 mcg total) into the lungs once daily. 30 capsule 1    carvedilol (COREG) 3.125 MG tablet TAKE 1 TABLET BY MOUTH TWICE DAILY 60 tablet 0    lisinopril (PRINIVIL,ZESTRIL) 2.5 MG tablet Take 2.5 mg by mouth.       No current facility-administered medications for this visit.        Review of Systems   Constitutional: Positive for activity change, appetite change and fatigue. Negative for chills, fever and unexpected weight change.   HENT: Negative for dental problem, sinus pressure, sneezing and trouble  "swallowing.    Eyes: Negative for visual disturbance.   Respiratory: Positive for cough and shortness of breath. Negative for choking and chest tightness.    Cardiovascular: Positive for chest pain. Negative for leg swelling.   Gastrointestinal: Negative for abdominal pain, blood in stool, constipation, diarrhea and nausea.   Genitourinary: Negative for difficulty urinating and dysuria.   Musculoskeletal: Negative for arthralgias and back pain.   Skin: Negative for rash and wound.   Neurological: Negative for dizziness, light-headedness and headaches.   Hematological: Negative for adenopathy. Does not bruise/bleed easily.   Psychiatric/Behavioral: Negative for sleep disturbance. The patient is not nervous/anxious.        ECOG Performance Status:   ECOG SCORE           Objective:      Vitals:   Vitals:    01/05/18 0835   BP: 120/76   BP Location: Left arm   Patient Position: Sitting   BP Method: Medium (Automatic)   Pulse: (!) 118   Resp: 18   Temp: 97.3 °F (36.3 °C)   TempSrc: Oral   Weight: 48.9 kg (107 lb 12.9 oz)   Height: 5' 2" (1.575 m)     BMI: Body mass index is 19.72 kg/m².    Physical Exam   Constitutional: He is oriented to person, place, and time. He appears cachectic.   HENT:   Head: Normocephalic and atraumatic.   Mouth/Throat: Mucous membranes are dry.   Eyes: Pupils are equal, round, and reactive to light.   Neck: Normal range of motion. Neck supple.   Cardiovascular: Normal rate and regular rhythm.    Pulmonary/Chest: Effort normal and breath sounds normal. No respiratory distress.   Abdominal: Soft. He exhibits no distension. There is no tenderness.   Musculoskeletal: He exhibits no edema or tenderness.   Lymphadenopathy:     He has no cervical adenopathy.   Neurological: He is alert and oriented to person, place, and time. No cranial nerve deficit.   Skin: Skin is warm and dry.   Psychiatric: He has a normal mood and affect. His behavior is normal.       Laboratory Data:  No visits with results " within 1 Week(s) from this visit.   Latest known visit with results is:   Lab Visit on 12/21/2017   Component Date Value Ref Range Status    Sodium 12/21/2017 133* 136 - 145 mmol/L Final    Potassium 12/21/2017 3.9  3.5 - 5.1 mmol/L Final    Chloride 12/21/2017 100  95 - 110 mmol/L Final    CO2 12/21/2017 27  23 - 29 mmol/L Final    Glucose 12/21/2017 106  70 - 110 mg/dL Final    BUN, Bld 12/21/2017 11  8 - 23 mg/dL Final    Creatinine 12/21/2017 0.9  0.5 - 1.4 mg/dL Final    Calcium 12/21/2017 9.8  8.7 - 10.5 mg/dL Final    Total Protein 12/21/2017 8.1  6.0 - 8.4 g/dL Final    Albumin 12/21/2017 2.7* 3.5 - 5.2 g/dL Final    Total Bilirubin 12/21/2017 0.3  0.1 - 1.0 mg/dL Final    Comment: For infants and newborns, interpretation of results should be based  on gestational age, weight and in agreement with clinical  observations.  Premature Infant recommended reference ranges:  Up to 24 hours.............<8.0 mg/dL  Up to 48 hours............<12.0 mg/dL  3-5 days..................<15.0 mg/dL  6-29 days.................<15.0 mg/dL      Alkaline Phosphatase 12/21/2017 83  55 - 135 U/L Final    AST 12/21/2017 16  10 - 40 U/L Final    ALT 12/21/2017 24  10 - 44 U/L Final    Anion Gap 12/21/2017 6* 8 - 16 mmol/L Final    eGFR if African American 12/21/2017 >60.0  >60 mL/min/1.73 m^2 Final    eGFR if non African American 12/21/2017 >60.0  >60 mL/min/1.73 m^2 Final    Comment: Calculation used to obtain the estimated glomerular filtration  rate (eGFR) is the CKD-EPI equation.       Magnesium 12/21/2017 1.9  1.6 - 2.6 mg/dL Final    Sodium 12/21/2017 133* 136 - 145 mmol/L Final    Potassium 12/21/2017 3.9  3.5 - 5.1 mmol/L Final    Chloride 12/21/2017 100  95 - 110 mmol/L Final    CO2 12/21/2017 27  23 - 29 mmol/L Final    Glucose 12/21/2017 106  70 - 110 mg/dL Final    BUN, Bld 12/21/2017 11  8 - 23 mg/dL Final    Creatinine 12/21/2017 0.9  0.5 - 1.4 mg/dL Final    Calcium 12/21/2017 9.8  8.7 -  10.5 mg/dL Final    Total Protein 12/21/2017 8.1  6.0 - 8.4 g/dL Final    Albumin 12/21/2017 2.7* 3.5 - 5.2 g/dL Final    Total Bilirubin 12/21/2017 0.3  0.1 - 1.0 mg/dL Final    Comment: For infants and newborns, interpretation of results should be based  on gestational age, weight and in agreement with clinical  observations.  Premature Infant recommended reference ranges:  Up to 24 hours.............<8.0 mg/dL  Up to 48 hours............<12.0 mg/dL  3-5 days..................<15.0 mg/dL  6-29 days.................<15.0 mg/dL      Alkaline Phosphatase 12/21/2017 83  55 - 135 U/L Final    AST 12/21/2017 16  10 - 40 U/L Final    ALT 12/21/2017 24  10 - 44 U/L Final    Anion Gap 12/21/2017 6* 8 - 16 mmol/L Final    eGFR if African American 12/21/2017 >60.0  >60 mL/min/1.73 m^2 Final    eGFR if non African American 12/21/2017 >60.0  >60 mL/min/1.73 m^2 Final    Comment: Calculation used to obtain the estimated glomerular filtration  rate (eGFR) is the CKD-EPI equation.            Imaging:  CT 11/3/17  CT CHEST, ABDOMEN, and, PELVIS  with IV contrast    Protocol:  Axial CT images of the chest, abdomen, and pelvis were acquired  after the use of 75 cc [id number] IV contrast.  Coronal and sagittal reconstructions were acquired.    HISTORY:  67 year old M with Clinical trial disease assessment.     COMPARISON: CT chest abdomen pelvis 09/22/2017     FINDINGS:  Thoracic soft tissues: No significant abnormality.     Aorta: Normal in course and caliber, without significant atherosclerotic plaque. There are three branching vessels at the arch.       Heart: Stable small pericardial effusion     Anna Marie/Mediastinum: No significant lymphadenopathy     Lungs: Again demonstrated is right-sided pleural thickening and right pleural fluid.  There is a soft tissue mass in the right anterior upper lobe measuring 4.1 in axial dimension by 2.8 cm (series 2, image 20).  There is a soft tissue mass in the right lower lobe that now  measures 4.9 CM (series 2, image 36).  In the right lower lung region at the diaphragmatic marichuy there is a 4.7 cm lesion (series 2,  51).  There are stable radiation changes in the right middle and lower lung fields.  Moderate emphysema is again noted.     Liver: Normal in size and attenuation, with no focal hepatic lesions.       Gallbladder: There is cholelithiasis.     Bile Ducts: No evidence of dilated ducts.     Pancreas: Pancreatic head calcifications are noted.      Spleen: Unremarkable.     Adrenals: Unremarkable.     Kidneys/ Ureters: Normal in size and location. Normal concentration and excretion of contrast. No hydronephrosis or nephrolithiasis. No ureteral dilatation.     Bladder: No evidence of wall thickening.     Reproductive organs: Unremarkable.     GI Tract/Mesentery: No evidence of bowel obstruction or inflammation.     Peritoneal Space: No ascites. No free air.     Retroperitoneum:  No significant adenopathy.      Abdominal wall:  Right-sided inguinal hernia is again noted.      Vasculature: There is stable severe atherosclerotic change.     Bones: No acute fracture. Age-appropriate degenerative changes.   Impression        1.  Interval decrease in size of the mass in the right upper lobe; right lower lobe and right diaphragmatic marichuy mass have increased in size.  2.  The remainder this examination is unchanged.    RECIST SUMMARY: Date of prior examination 09/22/2017.  Right upper lobe mass measures 4.1 cm maximally (series 2, image 20), previously 4.7 CM  Right lower lobe mass measures 4.9 CM (series 2, image 36), previously 3.8 CM  Diaphragmatic marichuy mass measures 4.7 CM (series 2, image 51), previously 3.4 CM         Assessment:       1. Recurrent non-small cell lung cancer    2. Cancer associated pain           Plan:     Mr. Salazar has clinically improved since I had seen him last.  I am not sure if any further radiation may help palliate some of his pain.  I will check a CT now and will also  send for radiation evaluation.  Have refilled percocet.     Gomez Garsia DO, FACP  Hematology & Oncology  [address] [place] [address]  ph. [phone]  Fax. [phone]    25 minutes were spent in coordination of patient's care, record review and counseling.  More than 50% of the time was face-to-face.

## 2018-01-12 ENCOUNTER — HOSPITAL ENCOUNTER (OUTPATIENT)
Dept: RADIOLOGY | Facility: HOSPITAL | Age: 68
Discharge: HOME | End: 2018-01-12
Attending: INTERNAL MEDICINE
Payer: MEDICARE

## 2018-01-12 DIAGNOSIS — G89.3 CANCER ASSOCIATED PAIN: ICD-10-CM

## 2018-01-12 DIAGNOSIS — C34.90 RECURRENT NON-SMALL CELL LUNG CANCER: ICD-10-CM

## 2018-01-12 PROCEDURE — 74177 CT ABD & PELVIS W/CONTRAST: CPT | Mod: 26,,, | Performed by: RADIOLOGY

## 2018-01-12 PROCEDURE — 25500020 PHARM REV CODE 255: Performed by: INTERNAL MEDICINE

## 2018-01-12 PROCEDURE — 74177 CT ABD & PELVIS W/CONTRAST: CPT | Mod: TC

## 2018-01-12 PROCEDURE — 71260 CT THORAX DX C+: CPT | Mod: TC

## 2018-01-12 PROCEDURE — 71260 CT THORAX DX C+: CPT | Mod: 26,,, | Performed by: RADIOLOGY

## 2018-01-12 RX ADMIN — IOHEXOL 75 ML: 350 INJECTION, SOLUTION INTRAVENOUS at 04:01

## 2018-01-12 RX ADMIN — IOHEXOL 15 ML: 350 INJECTION, SOLUTION INTRAVENOUS at 03:01

## 2018-01-15 ENCOUNTER — INITIAL CONSULT (OUTPATIENT)
Dept: RADIATION ONCOLOGY | Facility: CLINIC | Age: 68
End: 2018-01-15
Payer: MEDICARE

## 2018-01-15 VITALS
DIASTOLIC BLOOD PRESSURE: 55 MMHG | HEART RATE: 103 BPM | WEIGHT: 106 LBS | RESPIRATION RATE: 22 BRPM | BODY MASS INDEX: 19.51 KG/M2 | TEMPERATURE: 98 F | SYSTOLIC BLOOD PRESSURE: 90 MMHG | HEIGHT: 62 IN

## 2018-01-15 DIAGNOSIS — C34.90 RECURRENT NON-SMALL CELL LUNG CANCER: ICD-10-CM

## 2018-01-15 DIAGNOSIS — G89.3 CANCER ASSOCIATED PAIN: ICD-10-CM

## 2018-01-15 DIAGNOSIS — C34.91 PRIMARY CANCER OF RIGHT LUNG: Primary | ICD-10-CM

## 2018-01-15 PROCEDURE — 99204 OFFICE O/P NEW MOD 45 MIN: CPT | Mod: S$GLB,,, | Performed by: RADIOLOGY

## 2018-01-15 PROCEDURE — 99999 PR PBB SHADOW E&M-EST. PATIENT-LVL IV: CPT | Mod: PBBFAC,,, | Performed by: RADIOLOGY

## 2018-01-15 NOTE — PROGRESS NOTES
REFERRING PHYSICIAN: Gomez Garsia D.O.    PROBLEM: Mr. Salazar is a 67 years old man with history of stage T3 N2 M0 squamous cell carcinoma of the right upper lobe of the lung treated with radiation and chemotherapy recurrent in the right hemithorax now with a mass in the right lower lobe of the lung encasing the right 11th and 12th rib heads and invading the chest wall and vertebral bodies associated with pain.    OTHER MEDICAL HISTORY: Patient quit smoking in July 2016.  He has had a hernia repair.  He suffered a cardiac arrest and was hospitalized in ICU in June 2016.  He is treated or followed for malnutrition, emphysema, hypercalcemia, hyperlipidemia and hypertension. PS is ECOG 1. Psychosocial Distress screening score of Distress Score: 5 noted and reviewed. No intervention indicated.    PRIOR CANCER HISTORY: A diagnosis of squamous cell carcinoma of the right upper lobe of the lung was made at bronchoscopy on 7/28/16.  The EBUS reported metastatic disease in station 4R.  Notes indicate there was superior vena caval syndrome.  He was treated with radiation and concurrent carboplatin and Taxol chemotherapy at [facility] to a dose of 58.4 Gy over a period of about 5-6 weeks.  Treatment ended 10/31/16.     A PET/CT scan on 4/18/17 showed a partly hypermetabolic mass in the right lung apex and a 2 cm mass on the pleura deep to the right 10th rib had, a mass located between the upper pole of the right kidney and the 11th rib head, hyperactivity in the left greater trochanter bursa, on the medial rim of the left acetabulum.  There is an intramedullary vesna in the right proximal femur. Since April 2017 he has been treated with carboplatin and gemcitabine and with several immunotherapy or targeted agents.  The most recent treatment was with a afitinib which ended in December 2017 because of his intolerance of the treatment.    PRESENT ILLNESS: For the past 2 months patient has been developing  increasingly severe pain in the right costovertebral angle area.  He takes narcotic pain medication.  CT of the chest, abdomen and pelvis on 1/12/18 notes enlargement of a mass in the right lower lobe of the lung lying on the pleura deep to the ninth, 10th and 11th rib heads.  It invades the ninth rib head.  It destroys the right pedicles and the right side of the vertebral bodies.  There is a 6 x 6 cm right retrocrural mass that destroys the right 12th rib head and extends caudally to the origin of the right psoas muscle.  There is a stable pleural-based mass in the right pulmonary apex, pleural thickening and a small pleural effusion.  There are changes of what appeared to be radiation fibrosis at the right pulmonary apex.  There are calcifications in the pancreatic head suggesting a history of chronic pancreatitis.    PHYSICAL EXAMINATION: Patient is an alert thin man who responds appropriately with normal speech and voice.  The weight is 106 pounds in the BMI is 19.4.  He is seated in a wheelchair which she uses in clinic because of the distances.  He is ambulatory with a cane.  He indicates pain centering in the right costovertebral angle area.  The respirations are normal.  There is no cervical or supraclavicular lymphadenopathy.  There are no evident neurologic deficits.    RADIOLOGIC STUDIES: In addition to those noted above, MRI scan of the brain on 8/2/17 shows no evidence of metastatic disease.  There is a lacunar infarct in the left corona radiata and calcifications in the basal ganglia.    LABORATORY STUDIES: On 12/4/17 the hemoglobin is 10.4 with a white count of 7890 and a platelet count of 550,000.  Comprehensive metabolic panel on 12/21/17 is remarkable for a sodium slightly decreased to 133 and an albumin of 2.7.    IMPRESSION: Radiation treatment of the complex of tumor in the right lower lobe and retrocrural sites is recommended to relieve pain.    PLAN: Patient returns 1/19/18 for radiation  treatment planning simulation.  A course of radiation to a dose of about 30 Gy in fractions of 3Gy is expected to begin in the week of 1/22/18.  (45 minutes in discussion with patient and wife).

## 2018-01-15 NOTE — LETTER
January 15, 2018      Gomez Garsia DO, FACP  [address] [facility] [address]           [facility] - Radiation Oncology  [address]  [facility] [address]  Phone: [phone]          Patient: Karsten Salazar   MR Number: [id number]   YOB: 1950   Date of Visit: 1/15/2018       Dear Dr. Gomez Garsia:    Thank you for referring Karsten Salazar to me for evaluation. Attached you will find relevant portions of my assessment and plan of care.    If you have questions, please do not hesitate to call me. I look forward to following Karsten Salazar along with you.    Sincerely,    Gerry Velez MD    Enclosure  CC:  No Recipients    If you would like to receive this communication electronically, please contact [email or web address] or [phone] to request more information on [facility] access.    For providers and/or their staff who would like to refer a patient to [facility], please contact us through our one-stop-shop provider referral line, [facility], at [phone].    If you feel you have received this communication in error or would no longer like to receive these types of communications, please e-mail [email or web address]

## 2018-01-19 ENCOUNTER — HOSPITAL ENCOUNTER (OUTPATIENT)
Dept: RADIATION THERAPY | Facility: HOSPITAL | Age: 68
Discharge: HOME | End: 2018-01-19
Attending: RADIOLOGY
Payer: MEDICARE

## 2018-01-19 PROCEDURE — 77334 RADIATION TREATMENT AID(S): CPT | Mod: TC | Performed by: RADIOLOGY

## 2018-01-19 PROCEDURE — 77290 THER RAD SIMULAJ FIELD CPLX: CPT | Mod: TC | Performed by: RADIOLOGY

## 2018-01-19 PROCEDURE — 77014 HC CT GUIDANCE RADIATION THERAPY FLDS PLACEMENT: CPT | Mod: TC | Performed by: RADIOLOGY

## 2018-01-19 PROCEDURE — 77263 THER RADIOLOGY TX PLNG CPLX: CPT | Mod: ,,, | Performed by: RADIOLOGY

## 2018-01-19 PROCEDURE — 77334 RADIATION TREATMENT AID(S): CPT | Mod: 26,,, | Performed by: RADIOLOGY

## 2018-01-19 PROCEDURE — 77290 THER RAD SIMULAJ FIELD CPLX: CPT | Mod: 26,,, | Performed by: RADIOLOGY

## 2018-01-22 PROBLEM — Z09 CHEMOTHERAPY FOLLOW-UP EXAMINATION: Status: RESOLVED | Noted: 2017-10-23 | Resolved: 2018-01-22

## 2018-01-24 ENCOUNTER — TELEPHONE (OUTPATIENT)
Dept: HEMATOLOGY/ONCOLOGY | Facility: CLINIC | Age: 68
End: 2018-01-24

## 2018-01-24 ENCOUNTER — HOSPITAL ENCOUNTER (EMERGENCY)
Facility: OTHER | Age: 68
Discharge: HOME | End: 2018-01-24
Attending: EMERGENCY MEDICINE
Payer: MEDICARE

## 2018-01-24 VITALS
RESPIRATION RATE: 16 BRPM | BODY MASS INDEX: 20.8 KG/M2 | HEIGHT: 62 IN | OXYGEN SATURATION: 98 % | HEART RATE: 114 BPM | WEIGHT: 113 LBS | TEMPERATURE: 97 F | DIASTOLIC BLOOD PRESSURE: 96 MMHG | SYSTOLIC BLOOD PRESSURE: 144 MMHG

## 2018-01-24 DIAGNOSIS — C34.01 MALIGNANT NEOPLASM OF HILUS OF RIGHT LUNG: Primary | ICD-10-CM

## 2018-01-24 DIAGNOSIS — G89.3 CANCER ASSOCIATED PAIN: Primary | ICD-10-CM

## 2018-01-24 DIAGNOSIS — Z00.6 EXAM FOR CLINICAL TRIAL: ICD-10-CM

## 2018-01-24 DIAGNOSIS — Z09 CHEMOTHERAPY FOLLOW-UP EXAMINATION: ICD-10-CM

## 2018-01-24 DIAGNOSIS — R63.0 DECREASED APPETITE: Primary | ICD-10-CM

## 2018-01-24 DIAGNOSIS — C34.90 RECURRENT NON-SMALL CELL LUNG CANCER: ICD-10-CM

## 2018-01-24 DIAGNOSIS — R05.9 COUGH: ICD-10-CM

## 2018-01-24 DIAGNOSIS — R06.02 SOB (SHORTNESS OF BREATH): ICD-10-CM

## 2018-01-24 DIAGNOSIS — R06.02 SHORTNESS OF BREATH: ICD-10-CM

## 2018-01-24 DIAGNOSIS — G89.3 CANCER ASSOCIATED PAIN: ICD-10-CM

## 2018-01-24 DIAGNOSIS — E83.52 HYPERCALCEMIA: ICD-10-CM

## 2018-01-24 DIAGNOSIS — J43.2 CENTRILOBULAR EMPHYSEMA: ICD-10-CM

## 2018-01-24 DIAGNOSIS — E43 SEVERE PROTEIN-CALORIE MALNUTRITION: ICD-10-CM

## 2018-01-24 LAB
ALBUMIN SERPL BCP-MCNC: 2.9 G/DL
ALP SERPL-CCNC: 97 U/L
ALT SERPL W/O P-5'-P-CCNC: 24 U/L
ANION GAP SERPL CALC-SCNC: 11 MMOL/L
AST SERPL-CCNC: 17 U/L
BASOPHILS # BLD AUTO: 0 K/UL
BASOPHILS NFR BLD: 0 %
BILIRUB SERPL-MCNC: 0.3 MG/DL
BNP SERPL-MCNC: 322 PG/ML
BUN SERPL-MCNC: 21 MG/DL
CALCIUM SERPL-MCNC: 14.8 MG/DL
CHLORIDE SERPL-SCNC: 95 MMOL/L
CO2 SERPL-SCNC: 24 MMOL/L
CREAT SERPL-MCNC: 1.1 MG/DL
DIFFERENTIAL METHOD: ABNORMAL
EOSINOPHIL # BLD AUTO: 0 K/UL
EOSINOPHIL NFR BLD: 0.1 %
ERYTHROCYTE [DISTWIDTH] IN BLOOD BY AUTOMATED COUNT: 18.2 %
EST. GFR  (AFRICAN AMERICAN): >60 ML/MIN/1.73 M^2
EST. GFR  (NON AFRICAN AMERICAN): >60 ML/MIN/1.73 M^2
GLUCOSE SERPL-MCNC: 125 MG/DL
HCT VFR BLD AUTO: 32.3 %
HGB BLD-MCNC: 10.5 G/DL
LYMPHOCYTES # BLD AUTO: 1.3 K/UL
LYMPHOCYTES NFR BLD: 9.6 %
MAGNESIUM SERPL-MCNC: 2.1 MG/DL
MCH RBC QN AUTO: 27.1 PG
MCHC RBC AUTO-ENTMCNC: 32.5 G/DL
MCV RBC AUTO: 84 FL
MONOCYTES # BLD AUTO: 0.9 K/UL
MONOCYTES NFR BLD: 6.5 %
NEUTROPHILS # BLD AUTO: 11.1 K/UL
NEUTROPHILS NFR BLD: 83.1 %
PLATELET # BLD AUTO: 582 K/UL
PMV BLD AUTO: 9.5 FL
POTASSIUM SERPL-SCNC: 4.7 MMOL/L
PROT SERPL-MCNC: 9.5 G/DL
RBC # BLD AUTO: 3.87 M/UL
SODIUM SERPL-SCNC: 130 MMOL/L
WBC # BLD AUTO: 13.42 K/UL

## 2018-01-24 PROCEDURE — 99285 EMERGENCY DEPT VISIT HI MDM: CPT | Mod: 25

## 2018-01-24 PROCEDURE — 83735 ASSAY OF MAGNESIUM: CPT

## 2018-01-24 PROCEDURE — 93010 ELECTROCARDIOGRAM REPORT: CPT | Mod: ,,, | Performed by: INTERNAL MEDICINE

## 2018-01-24 PROCEDURE — 83880 ASSAY OF NATRIURETIC PEPTIDE: CPT

## 2018-01-24 PROCEDURE — 25500020 PHARM REV CODE 255: Performed by: EMERGENCY MEDICINE

## 2018-01-24 PROCEDURE — 80053 COMPREHEN METABOLIC PANEL: CPT

## 2018-01-24 PROCEDURE — 96360 HYDRATION IV INFUSION INIT: CPT

## 2018-01-24 PROCEDURE — 25000003 PHARM REV CODE 250: Performed by: EMERGENCY MEDICINE

## 2018-01-24 PROCEDURE — 85025 COMPLETE CBC W/AUTO DIFF WBC: CPT

## 2018-01-24 RX ORDER — DRONABINOL 5 MG/1
5 CAPSULE ORAL
Qty: 60 CAPSULE | Refills: 0 | Status: SHIPPED | OUTPATIENT
Start: 2018-01-24

## 2018-01-24 RX ADMIN — SODIUM CHLORIDE 1000 ML: 0.9 INJECTION, SOLUTION INTRAVENOUS at 06:01

## 2018-01-24 RX ADMIN — IOHEXOL 75 ML: 350 INJECTION, SOLUTION INTRAVENOUS at 06:01

## 2018-01-24 RX ADMIN — SODIUM CHLORIDE 1000 ML: 0.9 INJECTION, SOLUTION INTRAVENOUS at 05:01

## 2018-01-24 NOTE — ED PROVIDER NOTES
Encounter Date: 1/24/2018    SCRIBE #1 NOTE: I, Karina Hall, am scribing for, and in the presence of, Dr. Davalos.       History     Chief Complaint   Patient presents with    Anorexia     Pt reports loss of appetite x 4 days. Heartburn. Low back pain. Scrotal pain. Hx Lung CA Stage III, dx one year ago. Stopped chemo 1 month ago. Had CT last week here at [facility]. Has radiation scheduled for Friday.     Time seen by provider: 4:55 PM    This is a 67 y.o. male who presents with complaint of SOB for a couple of days. SOB worsens with exertion. He uses at home breathing treatments at night, but has been out of oxygen tanks. He reports productive cough with yellow sputum, but denies coughing up blood, nausea, or vomiting. He has loss of appetite for four days, pain when swallowing, and generalized weakness. He has history of lung cancer, stopped chemo one month ago, and has radiation scheduled on 1/29. He takes Percocet for pain and has sick contacts. Denies history of blood clots in the lungs or use of blood thinners.    Patient also has complaint of testicular pain that began last night. He notes lower back pain and constipation. Last bowel movement was four days ago.      The history is provided by the patient.     Review of patient's allergies indicates:  No Known Allergies  Past Medical History:   Diagnosis Date    Cancer associated pain 8/28/2017    Centrilobular emphysema 9/11/2017    Chemotherapy follow-up examination 10/23/2017    Decreased appetite 10/23/2017    Exam for clinical trial 8/9/2017    History of cardiac arrest     Hypercalcemia 11/6/2017    Hyperlipidemia     Hypertension     Hypokalemia 12/4/2017    Mediastinal lymphadenopathy 5/24/2017    Primary cancer of right lung 4/6/2017    Protein calorie malnutrition 9/11/2017    Recurrent non-small cell lung cancer 5/24/2017    Weight loss 12/4/2017     Past Surgical History:   Procedure Laterality Date    HERNIA REPAIR       Family  History   Problem Relation Age of Onset    Cancer Mother      Social History   Substance Use Topics    Smoking status: Former Smoker     Packs/day: 1.50     Years: 48.00     Types: Cigarettes    Smokeless tobacco: Never Used      Comment: Quit 7/2016    Alcohol use 10.2 oz/week     15 Cans of beer, 2 Shots of liquor per week      Comment: occasional     Review of Systems   Constitutional: Positive for appetite change. Negative for fever.   HENT: Positive for trouble swallowing. Negative for sore throat.    Respiratory: Positive for cough and shortness of breath.         Negative for coughing up blood.   Cardiovascular: Negative for chest pain.   Gastrointestinal: Positive for constipation. Negative for nausea and vomiting.   Genitourinary: Positive for testicular pain. Negative for dysuria.   Musculoskeletal: Positive for back pain.   Skin: Negative for rash.   Neurological: Positive for weakness.   Hematological: Does not bruise/bleed easily.       Physical Exam     Initial Vitals [01/24/18 1547]   BP Pulse Resp Temp SpO2   94/62 (!) 115 16 97.4 °F (36.3 °C) 100 %      MAP       72.67         Physical Exam    Nursing note and vitals reviewed.  Constitutional: He appears well-developed. He is not diaphoretic. He appears cachectic. No distress.   He appears thin and chronically ill.   HENT:   Head: Normocephalic and atraumatic.   Head: Temporal wasting present.   Mouth: Mucous membranes are moist. Poor dentition.   Eyes: Conjunctivae and EOM are normal.   Neck: Normal range of motion. Neck supple.   Cardiovascular: Regular rhythm and normal heart sounds. Tachycardia present.  Exam reveals no gallop and no friction rub.    No murmur heard.  Pulmonary/Chest: No respiratory distress. He has no wheezes. He has no rhonchi. He has no rales.   Increased work of breathing.   Genitourinary: Penis normal. Right testis shows no tenderness. Left testis shows no tenderness. Uncircumcised.   Genitourinary Comments: Firm  nontender swelling to left inguinal region, consistent with remote hernia repair.   Musculoskeletal: Normal range of motion. He exhibits no edema or tenderness.   No lower extremity edema.   Neurological: He is alert and oriented to person, place, and time.   Skin: Skin is warm and dry. No rash noted.   Psychiatric: He has a normal mood and affect.         ED Course   Procedures  Labs Reviewed   CBC W/ AUTO DIFFERENTIAL - Abnormal; Notable for the following:        Result Value    WBC 13.42 (*)     RBC 3.87 (*)     Hemoglobin 10.5 (*)     Hematocrit 32.3 (*)     RDW 18.2 (*)     Platelets 582 (*)     Gran # 11.1 (*)     Gran% 83.1 (*)     Lymph% 9.6 (*)     All other components within normal limits   COMPREHENSIVE METABOLIC PANEL - Abnormal; Notable for the following:     Sodium 130 (*)     Glucose 125 (*)     Calcium 14.8 (*)     Total Protein 9.5 (*)     Albumin 2.9 (*)     All other components within normal limits    Narrative:      Calcium critical result(s) called and verbal readback obtained from   Moni Shi RN., 01/24/2018 18:00   B-TYPE NATRIURETIC PEPTIDE - Abnormal; Notable for the following:      (*)     All other components within normal limits   MAGNESIUM   URINALYSIS     Imaging Results          CTA Chest Non-Coronary - PE Study (Final result)  Result time 01/24/18 18:52:39    Final result by Mildred Teague MD (01/24/18 18:52:39)                 Impression:        No evidence of pulmonary thromboembolism.    No significant change in the right upper lobe mass and adjacent consolidation.    There appears to be slight increase in size of the pleural-based soft tissue masses along the right diaphragmatic cruise and in the medial right lower lobe with adjacent bony destruction.      Electronically signed by: MILDRED TEAGUE MD  Date:     01/24/18  Time:    18:52              Narrative:    Technique: The chest was surveyed from the costophrenic angles through the lung apices at 3-mm  increments after the administration of 75 cc of Omnipaque 350 intravenous contrast material according to the PE protocol.  Axial, sagittal and coronal maximum intensity projection images were reviewed.    Comparison:CT of the chest 1/12/18    Findings:     The pulmonary arteries distribute normally.  This study is adequate for the evaluation of pulmonary thromboembolism.  There is no evidence of intraluminal filling defect the level of the segmental arteries bilaterally to suggest pulmonary thromboembolism.    No significant change in the right upper lobe mass, although measurement is difficult due to streak artifact from adjacent contrast bolus. Adjacent consolidation scattered throughout the right upper lobe is unchanged. Increased prominence of the luminal irregularity involving the right lower lobe bronchus.    The pleural based masses of the right lung at the medial right lower lobe the diaphragmatic marichuy are identified. They seem to be slightly enlarged when compared to prior. The more inferior mass measures 6.7 x 5.2 cm today, previously 6.2 x 5.4 cm. The more superior pleural-based mass measures 6.5 x 4.5 cm today, previously 6.2 x 4.3 cm. Slight worsening of the bony destruction and of multiple right ribs and thoracic vertebral bodies.    The heart does not appear enlarged.  No evidence pericardial fluid or disease.The aorta maintains normal caliber, contour, and course.     The esophagus maintains normal caliber and contour.  The visualized structures of the upper abdomen demonstrate no significant abnormalities.   The osseous structures demonstrate a nonunited sternal fracture. Healed anterior right rib fracture..                             X-Ray Chest PA And Lateral (Final result)  Result time 01/24/18 17:52:56    Final result by Magdy Teague MD (01/24/18 17:52:56)                 Impression:        Unchanged dense opacity of the right upper lobe. Left lung is clear.      Electronically signed  by: MILDRED ROQUE MD  Date:     01/24/18  Time:    17:52              Narrative:    Technique:  Chest PA and lateral radiographs    Comparison: None.    Findings:     There is opacity of the right upper lung is unchanged. Eventration of the right hemidiaphragm with colonic interposition also unchanged. Left lung is clear. No pleural fluid or pneumothorax. The mediastinal structures are midline. The cardiac silhouette is normal in size. The osseous structures demonstrate no acute abnormality.                            EKG Readings: (Independently Interpreted)   Initial Reading: No STEMI. Previous EKG: Compared with most recent EKG Previous EKG Date: 9/2017.   Sinus tachycardia, heart rate 103, right axis deviation, narrow QRS, normal QTC       X-Rays:   Independently Interpreted Readings:   Chest X-Ray: Elevated right hemidiaphragm. Mass to right upper lobe present.     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 Pt has tried Megace without improvement, last bm 4 days prior and constipated, no episodes of vomitting. Here pt endorses baseline back pain and shortness of breath. On exam pt is thin, chronically ill appearing, tachycardic with mild tachypnea. Ddx anemia, pna, ptx, PE, dehydration. Will admin IVF, cxr, and reassess.   Clinical Tests:   Lab Tests: Ordered and Reviewed  Radiological Study: Ordered and Reviewed  Medical Tests: Ordered and Reviewed  ED Management:  Labs notable for severe hypercalcemia, chronic hyponatremia and pt treated with IVF.    7:39 PM - Discussed case with Dr. Webster (Hem/Onc), and wants to initiate Zometa here in ED and plan for outpatient redraw on Friday given patient is not desiring to stay for admission. Discussed plans with pt and wife,  and agreeable to this course of action.             Scribe Attestation:   Scribe #1: I performed the above scribed service and the documentation accurately describes the services I performed. I attest to the accuracy of the note.    Attending Attestation:           Physician Attestation for Scribe:  Physician Attestation Statement for Scribe #1: I, Dr. Davalos, reviewed documentation, as scribed by Karina Hall in my presence, and it is both accurate and complete.                 ED Course      Clinical Impression:     1. Cancer associated pain    2. Cough    3. Shortness of breath    4. Hypercalcemia    5. SOB (shortness of breath)        Disposition:   Disposition: Discharged  Condition: Serious                        Kirsty Davalos MD  01/24/18 1950

## 2018-01-24 NOTE — ED TRIAGE NOTES
Pt presents with SOB, onset last night while laying down. Pt denies NV. Pt also reports decreased appetite for four days. Pt also reports chronic upper back pain, took percocet this AM. Pt hx of lung cancer, no mets, not currently on chemo, due to start radiation on Friday. Pt also repots left inguinal pain, hx of hernia repair. Pt in NAD

## 2018-01-24 NOTE — TELEPHONE ENCOUNTER
----- Message from Ana Monroe sent at 1/24/2018  8:42 AM CST -----  Contact: Pt wife Radha  Pt wife calling stating that pt is having pain in his groin area. He isn't eating and he can not lay on his back        Radha calling back number [phone]

## 2018-01-25 ENCOUNTER — TELEPHONE (OUTPATIENT)
Dept: HEMATOLOGY/ONCOLOGY | Facility: CLINIC | Age: 68
End: 2018-01-25

## 2018-01-25 ENCOUNTER — DOCUMENTATION ONLY (OUTPATIENT)
Dept: HEMATOLOGY/ONCOLOGY | Facility: CLINIC | Age: 68
End: 2018-01-25

## 2018-01-25 DIAGNOSIS — E83.52 HYPERCALCEMIA OF MALIGNANCY: ICD-10-CM

## 2018-01-25 PROCEDURE — 77295 3-D RADIOTHERAPY PLAN: CPT | Mod: 26,,, | Performed by: RADIOLOGY

## 2018-01-25 PROCEDURE — 77300 RADIATION THERAPY DOSE PLAN: CPT | Mod: TC | Performed by: RADIOLOGY

## 2018-01-25 PROCEDURE — 77300 RADIATION THERAPY DOSE PLAN: CPT | Mod: 26,,, | Performed by: RADIOLOGY

## 2018-01-25 PROCEDURE — 77334 RADIATION TREATMENT AID(S): CPT | Mod: 26,,, | Performed by: RADIOLOGY

## 2018-01-25 PROCEDURE — 77334 RADIATION TREATMENT AID(S): CPT | Mod: TC | Performed by: RADIOLOGY

## 2018-01-25 PROCEDURE — 77295 3-D RADIOTHERAPY PLAN: CPT | Mod: TC | Performed by: RADIOLOGY

## 2018-01-25 NOTE — PROGRESS NOTES
Received referral from the clinic yesterday that patient having difficulty affording appetite stimulant marinol. Prescription sent to [facility] to process and obtain price. Called the pharmacy to follow up on the price of the medication in order to assist if need be. The price was $3.35 for the medication. I called and spoke to his wife to inform her the prescription was at the main campus pharmacy and review the cost. She stated that at the other pharmacy it was not being covered at all but the could afford the copay for this medication. She acknowledged that she will pick it up from the pharmacy today 1/25/18.

## 2018-01-25 NOTE — TELEPHONE ENCOUNTER
----- Message from Ana Monroe sent at 1/25/2018  8:33 AM CST -----  Contact: Pt wife Sanjuanita  Pt wife calling stating that when pt was discharged they were told that pt needs to be seen right away. He can not wait until 1/31. She would like for him to be added to the schedule today        Sanjuanita call back number [phone]

## 2018-01-25 NOTE — ED NOTES
NEURO: Pt AAO x 4. Behavior and speech appropriate to situation.   CARDIAC: Regular rhythm auscultated  RESPIRATORY: Respirations even and unlabored. Diminished breath sounds to ant rt upper lung, rhonchi to post RML   MUSCULOSKELETAL: Active ROM noted to extremities

## 2018-01-25 NOTE — TELEPHONE ENCOUNTER
Spoke to patient's wife let her know Dr. Garsia is not in the office today. Rescheduled patient for Monday at 9:20.

## 2018-01-26 ENCOUNTER — TELEPHONE (OUTPATIENT)
Dept: HEMATOLOGY/ONCOLOGY | Facility: CLINIC | Age: 68
End: 2018-01-26

## 2018-01-26 ENCOUNTER — INFUSION (OUTPATIENT)
Dept: INFUSION THERAPY | Facility: HOSPITAL | Age: 68
End: 2018-01-26
Attending: INTERNAL MEDICINE
Payer: MEDICARE

## 2018-01-26 ENCOUNTER — DOCUMENTATION ONLY (OUTPATIENT)
Dept: HEMATOLOGY/ONCOLOGY | Facility: CLINIC | Age: 68
End: 2018-01-26

## 2018-01-26 VITALS — DIASTOLIC BLOOD PRESSURE: 89 MMHG | HEART RATE: 94 BPM | RESPIRATION RATE: 18 BRPM | SYSTOLIC BLOOD PRESSURE: 150 MMHG

## 2018-01-26 DIAGNOSIS — E83.52 HYPERCALCEMIA: Primary | ICD-10-CM

## 2018-01-26 DIAGNOSIS — C34.91 PRIMARY CANCER OF RIGHT LUNG: ICD-10-CM

## 2018-01-26 PROCEDURE — 25000003 PHARM REV CODE 250: Performed by: INTERNAL MEDICINE

## 2018-01-26 PROCEDURE — 77412 RADIATION TX DELIVERY LVL 3: CPT | Performed by: RADIOLOGY

## 2018-01-26 PROCEDURE — 77417 THER RADIOLOGY PORT IMAGE(S): CPT | Performed by: RADIOLOGY

## 2018-01-26 PROCEDURE — 96360 HYDRATION IV INFUSION INIT: CPT

## 2018-01-26 RX ADMIN — SODIUM CHLORIDE: 0.9 INJECTION, SOLUTION INTRAVENOUS at 11:01

## 2018-01-26 NOTE — PLAN OF CARE
Problem: Patient Care Overview  Goal: Plan of Care Review  Outcome: Ongoing (interventions implemented as appropriate)  Pt arrived for IVF via wheelchair. Pt c/o weakness, fatigue and diarrhea. PIV placed to left hand. Blankets and snacks provided; chair reclined. VSS; NAD. Will monitor.

## 2018-01-26 NOTE — PLAN OF CARE
Problem: Patient Care Overview  Goal: Plan of Care Review  Outcome: Ongoing (interventions implemented as appropriate)  Pt tolerated IVF. RTC Monday. PIV removed. Discharging via wheelchair.

## 2018-01-29 ENCOUNTER — LAB VISIT (OUTPATIENT)
Dept: LAB | Facility: HOSPITAL | Age: 68
End: 2018-01-29
Attending: INTERNAL MEDICINE
Payer: MEDICARE

## 2018-01-29 ENCOUNTER — OFFICE VISIT (OUTPATIENT)
Dept: HEMATOLOGY/ONCOLOGY | Facility: CLINIC | Age: 68
End: 2018-01-29
Payer: MEDICARE

## 2018-01-29 VITALS — OXYGEN SATURATION: 99 % | TEMPERATURE: 98 F | HEIGHT: 62 IN | RESPIRATION RATE: 18 BRPM

## 2018-01-29 DIAGNOSIS — R63.0 DECREASED APPETITE: ICD-10-CM

## 2018-01-29 DIAGNOSIS — G89.3 CANCER ASSOCIATED PAIN: ICD-10-CM

## 2018-01-29 DIAGNOSIS — C34.91 PRIMARY CANCER OF RIGHT LUNG: Primary | ICD-10-CM

## 2018-01-29 DIAGNOSIS — C34.91 PRIMARY CANCER OF RIGHT LUNG: ICD-10-CM

## 2018-01-29 DIAGNOSIS — E43 SEVERE PROTEIN-CALORIE MALNUTRITION: ICD-10-CM

## 2018-01-29 LAB
ALBUMIN SERPL BCP-MCNC: 2.3 G/DL
ALP SERPL-CCNC: 114 U/L
ALT SERPL W/O P-5'-P-CCNC: 14 U/L
ANION GAP SERPL CALC-SCNC: 8 MMOL/L
AST SERPL-CCNC: 15 U/L
BILIRUB SERPL-MCNC: 0.7 MG/DL
BUN SERPL-MCNC: 11 MG/DL
CALCIUM SERPL-MCNC: 11.6 MG/DL
CHLORIDE SERPL-SCNC: 100 MMOL/L
CO2 SERPL-SCNC: 23 MMOL/L
CREAT SERPL-MCNC: 1.1 MG/DL
EST. GFR  (AFRICAN AMERICAN): >60 ML/MIN/1.73 M^2
EST. GFR  (NON AFRICAN AMERICAN): >60 ML/MIN/1.73 M^2
GLUCOSE SERPL-MCNC: 95 MG/DL
POTASSIUM SERPL-SCNC: 3.8 MMOL/L
PROT SERPL-MCNC: 7.7 G/DL
SODIUM SERPL-SCNC: 131 MMOL/L

## 2018-01-29 PROCEDURE — 99214 OFFICE O/P EST MOD 30 MIN: CPT | Mod: S$GLB,,, | Performed by: INTERNAL MEDICINE

## 2018-01-29 PROCEDURE — 80053 COMPREHEN METABOLIC PANEL: CPT

## 2018-01-29 PROCEDURE — 99999 PR PBB SHADOW E&M-EST. PATIENT-LVL III: CPT | Mod: PBBFAC,,, | Performed by: INTERNAL MEDICINE

## 2018-01-29 PROCEDURE — 36415 COLL VENOUS BLD VENIPUNCTURE: CPT

## 2018-01-29 NOTE — Clinical Note
Refer to hospice Please notify radiation oncology and cancel further radiation oncology appointments

## 2018-01-29 NOTE — PROGRESS NOTES
PATIENT: Karsten Salazar  MRN: [id number]  DATE: 1/29/2018      Diagnosis:   1. Primary cancer of right lung    2. Severe protein-calorie malnutrition    3. Cancer associated pain    4. Decreased appetite        Chief Complaint: Lung Cancer      Oncologic History:      Oncologic History Non-small cell lung cancer diagnosed 6/2016   Recurrent disease to lung 5/2017    Oncologic Treatment Concurrent chemoXRT with weekly carboplatin and paclitaxel, XRT to a dose of 5840 cGy 10/31/2016  Carboplatin/gemcitabine 6/2/2017 - 7/2017 (discontinued due to progression)  Ipilimumab plus nivolumab  clinical trial 8/14/17 - 11/2017 (discontinued due to progression)  Afatinib 11/2017 (discontinued 12/2017 due to intolerance)    Pathology Squamous cell carcinoma, T3, N2, M0 (6/2016), PD-L1 TPS 1%        Subjective:    Interval History: Mr. Salazar is a 67 y.o. male who is seen in follow-up for lung cancer.  Since I had seen him last he is having ongoing pain in his right chest.  He started on palliative radiation last week.  He has since developed diarrhea.  He is essentially bedbound and requires maximum assistance for dressing and getting to appointments.  He has a decreased appetite and not eating much.  No other new complaints.    He has previously seen Dr. Ubaldo Thomason at [facility].  His history dates to 6/2016 when he sought medical attention for shortness of breath and cough.  He was found to have a large lung mass in the right upper lobe with mediastinal lymphadenopathy.  He underwent subsequent workup, however, symptoms worsened and progressed to him having a cardiac arrest requiring CPR and prolonged ICU stay.  Eventually a biopsy was performed showing squamous cell lung cancer.  This was initially staged as a T3, N2.  He was treated with concurrent chemotherapy and radiation until 10/31/16.  He is an on surveillance since this time.  His insurance changed and is now following and [facility].  Repeat PET/CT had shown locally  recurrent disease.  MRI of the brain was negative.  He was started on carboplatin and gemcitabine on 6/2/17.  CT on 7/19/17 and had indicated progressive disease.  He was started on ipilimumab and nivolumab on a clinical trial on 8/14/17.  CT in 9/2017 had revealed stable disease. CT in 11/2017 have demonstrated progressive disease and he was taken off immunotherapy.  He was subsequently placed on therapy with afatinib in 11/2017.  This was discontinued in 12/2017 due to intolerance.      Past Medical History:   Past Medical History:   Diagnosis Date    Cancer associated pain 8/28/2017    Centrilobular emphysema 9/11/2017    Chemotherapy follow-up examination 10/23/2017    Decreased appetite 10/23/2017    Exam for clinical trial 8/9/2017    History of cardiac arrest     Hypercalcemia 11/6/2017    Hyperlipidemia     Hypertension     Hypokalemia 12/4/2017    Mediastinal lymphadenopathy 5/24/2017    Primary cancer of right lung 4/6/2017    Protein calorie malnutrition 9/11/2017    Recurrent non-small cell lung cancer 5/24/2017    Weight loss 12/4/2017       Past Surgical HIstory:   Past Surgical History:   Procedure Laterality Date    HERNIA REPAIR         Family History:   Family History   Problem Relation Age of Onset    Cancer Mother        Social History:  reports that he has quit smoking. His smoking use included Cigarettes. He has a 72.00 pack-year smoking history. He has never used smokeless tobacco. He reports that he drinks about 10.2 oz of alcohol per week . He reports that he does not use drugs.    Allergies:  Review of patient's allergies indicates:  No Known Allergies    Medications:  Current Outpatient Prescriptions   Medication Sig Dispense Refill    albuterol 90 mcg/actuation inhaler Inhale 2 puffs into the lungs every 6 (six) hours as needed for Wheezing. 1 Inhaler 0    albuterol-ipratropium 2.5mg-0.5mg/3mL (DUO-NEB) 0.5 mg-3 mg(2.5 mg base)/3 mL nebulizer solution Take 3 mLs by  nebulization every 4 (four) hours as needed for Wheezing. 20 vial 0    aspirin (ECOTRIN) 81 MG EC tablet Take 1 tablet (81 mg total) by mouth once daily. 30 tablet 11    atorvastatin (LIPITOR) 40 MG tablet Take 40 mg by mouth.      benzonatate (TESSALON) 100 MG capsule Take 100 mg by mouth.      carvedilol (COREG) 3.125 MG tablet TAKE 1 TABLET BY MOUTH TWICE DAILY 180 tablet 0    cyproheptadine (PERIACTIN) 4 mg tablet TAKE 1 TABLET BY MOUTH THREE TIMES DAILY 90 tablet 0    dronabinol (MARINOL) 5 MG capsule Take 1 capsule (5 mg total) by mouth 2 (two) times daily before meals. 60 capsule 0    fluticasone (FLOVENT HFA) 110 mcg/actuation inhaler Inhale 2 puffs into the lungs 2 (two) times daily. Controller 12 g 0    guaifenesin-codeine 100-10 mg/5 ml (TUSSI-ORGANIDIN NR)  mg/5 mL syrup Take 5 mLs by mouth 3 (three) times daily as needed for Cough.      lisinopril (PRINIVIL,ZESTRIL) 2.5 MG tablet Take 2.5 mg by mouth.      magnesium hydroxide 400 mg/5 ml (MILK OF MAGNESIA) 400 mg/5 mL Susp Take 30 mLs by mouth daily as needed.      megestrol (MEGACE) 400 mg/10 mL (40 mg/mL) Susp Take 10 mLs (400 mg total) by mouth once daily. 480 mL 11    ondansetron (ZOFRAN, AS HYDROCHLORIDE,) 4 MG tablet Take 1 tablet (4 mg total) by mouth every 8 (eight) hours as needed for Nausea. 60 tablet 1    oxyCODONE-acetaminophen (PERCOCET)  mg per tablet Take 1 tablet by mouth every 4 (four) hours as needed for Pain. 90 tablet 0    potassium chloride SA (K-DUR,KLOR-CON) 20 MEQ tablet TAKE 2 TABLETS BY MOUTH TWICE DAILY 360 tablet 0    tiotropium (SPIRIVA) 18 mcg inhalation capsule Inhale 1 capsule (18 mcg total) into the lungs once daily. 30 capsule 1     No current facility-administered medications for this visit.        Review of Systems   Constitutional: Positive for activity change, appetite change and fatigue. Negative for chills, fever and unexpected weight change.   HENT: Negative for dental problem, sinus  "pressure, sneezing and trouble swallowing.    Eyes: Negative for visual disturbance.   Respiratory: Positive for cough and shortness of breath. Negative for choking and chest tightness.    Cardiovascular: Positive for chest pain. Negative for leg swelling.   Gastrointestinal: Positive for diarrhea. Negative for abdominal pain, blood in stool, constipation and nausea.   Genitourinary: Negative for difficulty urinating and dysuria.   Musculoskeletal: Negative for arthralgias and back pain.   Skin: Negative for rash and wound.   Neurological: Negative for dizziness, light-headedness and headaches.   Hematological: Negative for adenopathy. Does not bruise/bleed easily.   Psychiatric/Behavioral: Negative for sleep disturbance. The patient is not nervous/anxious.        ECOG Performance Status:   ECOG SCORE    3 - Capable of only limited selfcare, confined to bed or chair more than 50% of waking hours         Objective:      Vitals:   Vitals:    01/29/18 0918   Resp: 18   Temp: 98.2 °F (36.8 °C)   TempSrc: Oral   SpO2: 99%   Height: 5' 2" (1.575 m)     BMI: There is no height or weight on file to calculate BMI.    Physical Exam   Constitutional: He is oriented to person, place, and time. He appears cachectic.   Sitting in a wheelchair  Unable to stand for weight   HENT:   Head: Normocephalic and atraumatic.   Mouth/Throat: Mucous membranes are dry.   Eyes: Pupils are equal, round, and reactive to light.   Neck: Normal range of motion. Neck supple.   Cardiovascular: Normal rate and regular rhythm.    Pulmonary/Chest: Effort normal and breath sounds normal. No respiratory distress.   Abdominal: Soft. He exhibits no distension. There is no tenderness.   Musculoskeletal: He exhibits no edema or tenderness.   Lymphadenopathy:     He has no cervical adenopathy.   Neurological: He is alert and oriented to person, place, and time. No cranial nerve deficit.   Skin: Skin is warm and dry.   Psychiatric: He has a normal mood and " affect. His behavior is normal.       Laboratory Data:  Lab Visit on 01/26/2018   Component Date Value Ref Range Status    Sodium 01/26/2018 129* 136 - 145 mmol/L Final    Potassium 01/26/2018 4.1  3.5 - 5.1 mmol/L Final    Chloride 01/26/2018 99  95 - 110 mmol/L Final    CO2 01/26/2018 25  23 - 29 mmol/L Final    Glucose 01/26/2018 96  70 - 110 mg/dL Final    BUN, Bld 01/26/2018 15  8 - 23 mg/dL Final    Creatinine 01/26/2018 0.9  0.5 - 1.4 mg/dL Final    Calcium 01/26/2018 12.6* 8.7 - 10.5 mg/dL Final    Comment: *Critical value -   Results called to and read back by:CORNELL ABDALLA RN      Total Protein 01/26/2018 8.0  6.0 - 8.4 g/dL Final    Albumin 01/26/2018 2.5* 3.5 - 5.2 g/dL Final    Total Bilirubin 01/26/2018 0.4  0.1 - 1.0 mg/dL Final    Comment: For infants and newborns, interpretation of results should be based  on gestational age, weight and in agreement with clinical  observations.  Premature Infant recommended reference ranges:  Up to 24 hours.............<8.0 mg/dL  Up to 48 hours............<12.0 mg/dL  3-5 days..................<15.0 mg/dL  6-29 days.................<15.0 mg/dL      Alkaline Phosphatase 01/26/2018 86  55 - 135 U/L Final    AST 01/26/2018 16  10 - 40 U/L Final    ALT 01/26/2018 17  10 - 44 U/L Final    Anion Gap 01/26/2018 5* 8 - 16 mmol/L Final    eGFR if African American 01/26/2018 >60.0  >60 mL/min/1.73 m^2 Final    eGFR if non African American 01/26/2018 >60.0  >60 mL/min/1.73 m^2 Final    Comment: Calculation used to obtain the estimated glomerular filtration  rate (eGFR) is the CKD-EPI equation.      Admission on 01/24/2018, Discharged on 01/24/2018   Component Date Value Ref Range Status    WBC 01/24/2018 13.42* 3.90 - 12.70 K/uL Final    RBC 01/24/2018 3.87* 4.60 - 6.20 M/uL Final    Hemoglobin 01/24/2018 10.5* 14.0 - 18.0 g/dL Final    Hematocrit 01/24/2018 32.3* 40.0 - 54.0 % Final    MCV 01/24/2018 84  82 - 98 fL Final    MCH 01/24/2018 27.1   27.0 - 31.0 pg Final    MCHC 01/24/2018 32.5  32.0 - 36.0 g/dL Final    RDW 01/24/2018 18.2* 11.5 - 14.5 % Final    Platelets 01/24/2018 582* 150 - 350 K/uL Final    MPV 01/24/2018 9.5  9.2 - 12.9 fL Final    Gran # (ANC) 01/24/2018 11.1* 1.8 - 7.7 K/uL Final    Lymph # 01/24/2018 1.3  1.0 - 4.8 K/uL Final    Mono # 01/24/2018 0.9  0.3 - 1.0 K/uL Final    Eos # 01/24/2018 0.0  0.0 - 0.5 K/uL Final    Baso # 01/24/2018 0.00  0.00 - 0.20 K/uL Final    Gran% 01/24/2018 83.1* 38.0 - 73.0 % Final    Lymph% 01/24/2018 9.6* 18.0 - 48.0 % Final    Mono% 01/24/2018 6.5  4.0 - 15.0 % Final    Eosinophil% 01/24/2018 0.1  0.0 - 8.0 % Final    Basophil% 01/24/2018 0.0  0.0 - 1.9 % Final    Differential Method 01/24/2018 Automated   Final    Sodium 01/24/2018 130* 136 - 145 mmol/L Final    Potassium 01/24/2018 4.7  3.5 - 5.1 mmol/L Final    Chloride 01/24/2018 95  95 - 110 mmol/L Final    CO2 01/24/2018 24  23 - 29 mmol/L Final    Glucose 01/24/2018 125* 70 - 110 mg/dL Final    BUN, Bld 01/24/2018 21  8 - 23 mg/dL Final    Creatinine 01/24/2018 1.1  0.5 - 1.4 mg/dL Final    Calcium 01/24/2018 14.8* 8.7 - 10.5 mg/dL Final    Comment: Calcium critical result(s) called and verbal readback obtained from   Moni Shi RN., 01/24/2018 18:00      Total Protein 01/24/2018 9.5* 6.0 - 8.4 g/dL Final    Albumin 01/24/2018 2.9* 3.5 - 5.2 g/dL Final    Total Bilirubin 01/24/2018 0.3  0.1 - 1.0 mg/dL Final    Comment: For infants and newborns, interpretation of results should be based  on gestational age, weight and in agreement with clinical  observations.  Premature Infant recommended reference ranges:  Up to 24 hours.............<8.0 mg/dL  Up to 48 hours............<12.0 mg/dL  3-5 days..................<15.0 mg/dL  6-29 days.................<15.0 mg/dL      Alkaline Phosphatase 01/24/2018 97  55 - 135 U/L Final    AST 01/24/2018 17  10 - 40 U/L Final    ALT 01/24/2018 24  10 - 44 U/L Final    Anion Gap  01/24/2018 11  8 - 16 mmol/L Final    eGFR if  01/24/2018 >60  >60 mL/min/1.73 m^2 Final    eGFR if non African American 01/24/2018 >60  >60 mL/min/1.73 m^2 Final    Comment: Calculation used to obtain the estimated glomerular filtration  rate (eGFR) is the CKD-EPI equation.       Magnesium 01/24/2018 2.1  1.6 - 2.6 mg/dL Final    BNP 01/24/2018 322* 0 - 99 pg/mL Final         Imaging:  CT 11/3/17  CT CHEST, ABDOMEN, and, PELVIS  with IV contrast    Protocol:  Axial CT images of the chest, abdomen, and pelvis were acquired  after the use of 75 cc [id number] IV contrast.  Coronal and sagittal reconstructions were acquired.    HISTORY:  67 year old M with Clinical trial disease assessment.     COMPARISON: CT chest abdomen pelvis 09/22/2017     FINDINGS:  Thoracic soft tissues: No significant abnormality.     Aorta: Normal in course and caliber, without significant atherosclerotic plaque. There are three branching vessels at the arch.       Heart: Stable small pericardial effusion     Anna Marie/Mediastinum: No significant lymphadenopathy     Lungs: Again demonstrated is right-sided pleural thickening and right pleural fluid.  There is a soft tissue mass in the right anterior upper lobe measuring 4.1 in axial dimension by 2.8 cm (series 2, image 20).  There is a soft tissue mass in the right lower lobe that now measures 4.9 CM (series 2, image 36).  In the right lower lung region at the diaphragmatic marichuy there is a 4.7 cm lesion (series 2,  51).  There are stable radiation changes in the right middle and lower lung fields.  Moderate emphysema is again noted.     Liver: Normal in size and attenuation, with no focal hepatic lesions.       Gallbladder: There is cholelithiasis.     Bile Ducts: No evidence of dilated ducts.     Pancreas: Pancreatic head calcifications are noted.      Spleen: Unremarkable.     Adrenals: Unremarkable.     Kidneys/ Ureters: Normal in size and location. Normal concentration and  excretion of contrast. No hydronephrosis or nephrolithiasis. No ureteral dilatation.     Bladder: No evidence of wall thickening.     Reproductive organs: Unremarkable.     GI Tract/Mesentery: No evidence of bowel obstruction or inflammation.     Peritoneal Space: No ascites. No free air.     Retroperitoneum:  No significant adenopathy.      Abdominal wall:  Right-sided inguinal hernia is again noted.      Vasculature: There is stable severe atherosclerotic change.     Bones: No acute fracture. Age-appropriate degenerative changes.   Impression        1.  Interval decrease in size of the mass in the right upper lobe; right lower lobe and right diaphragmatic marichuy mass have increased in size.  2.  The remainder this examination is unchanged.    RECIST SUMMARY: Date of prior examination 09/22/2017.  Right upper lobe mass measures 4.1 cm maximally (series 2, image 20), previously 4.7 CM  Right lower lobe mass measures 4.9 CM (series 2, image 36), previously 3.8 CM  Diaphragmatic marichuy mass measures 4.7 CM (series 2, image 51), previously 3.4 CM         Assessment:       1. Primary cancer of right lung    2. Severe protein-calorie malnutrition    3. Cancer associated pain    4. Decreased appetite           Plan:     Mr. Salazar has severely declined since I seen him last.  He is now nearly bedbound.  I do not think further treatment directed towards his cancer would be beneficial and have discussed this in detail with him and his wife today.  At this point I would recommend consideration of hospice.  I've discussed what hospice does and does not do and they are agreeable to proceed with a hospice referral.  I told them that if I can be of any assistance in the future to please contact me and to keep us updated.  All questions were answered and he is agreeable with this plan.  We will cancel further radiation.    Gomez Garsia, DO, FACP  Hematology & Oncology  [address] [place] [address]  ph.  [phone]  Fax. [phone]    25 minutes were spent in coordination of patient's care, record review and counseling.  More than 50% of the time was face-to-face.

## 2020-08-16 NOTE — PLAN OF CARE
Problem: Chemotherapy Effects (Adult)  Goal: Signs and Symptoms of Listed Potential Problems Will be Absent, Minimized or Managed (Chemotherapy Effects)  Signs and symptoms of listed potential problems will be absent, minimized or managed by discharge/transition of care (reference Chemotherapy Effects (Adult) CPG).   Outcome: Ongoing (interventions implemented as appropriate)  Pt here for opdivo infusion, labs, hx, meds, allergies reviewed. Pt c/o feeling shortness of breath , pulse ox= 96%, pt states working on getting 02 at home, reclined in chair, warm blanket provided, continue to monitor      
Problem: Patient Care Overview  Goal: Plan of Care Review  Outcome: Outcome(s) achieved Date Met: 10/23/17  Pt tolerated 2gm Mg and opdivo infusion without issue, avs given, rtc 11/6/17, no distress noted upon d/c to home      
Oriented - self; Oriented - place; Oriented - time
